# Patient Record
Sex: MALE | Race: WHITE | NOT HISPANIC OR LATINO | Employment: FULL TIME | URBAN - METROPOLITAN AREA
[De-identification: names, ages, dates, MRNs, and addresses within clinical notes are randomized per-mention and may not be internally consistent; named-entity substitution may affect disease eponyms.]

---

## 2024-03-12 ENCOUNTER — MEDICAL CORRESPONDENCE (OUTPATIENT)
Dept: HEALTH INFORMATION MANAGEMENT | Facility: CLINIC | Age: 64
End: 2024-03-12
Payer: COMMERCIAL

## 2024-03-14 ENCOUNTER — TELEPHONE (OUTPATIENT)
Dept: ENDOCRINOLOGY | Facility: CLINIC | Age: 64
End: 2024-03-14
Payer: COMMERCIAL

## 2024-03-14 ENCOUNTER — TRANSCRIBE ORDERS (OUTPATIENT)
Dept: OTHER | Age: 64
End: 2024-03-14

## 2024-03-14 DIAGNOSIS — E05.90 HYPERTHYROIDISM: Primary | ICD-10-CM

## 2024-03-14 NOTE — TELEPHONE ENCOUNTER
Spoke with pt and scheduled sooner appt per Brenda triage note   CC's please schedule urgent on call or CAMILA within 1 week. I held 3/26 9AM with Dr. Lopez.     Thank you!   VIC Ahuja on 3/14/2024 at 4:00 PM

## 2024-03-15 NOTE — CONFIDENTIAL NOTE
RECORDS RECEIVED FROM: internal /ce   DATE RECEIVED: 3.26.24    NOTES (FOR ALL VISITS) STATUS DETAILS   OFFICE NOTES from referring provider ce   Dr. Kendell Powers affiliated with [facility].    OFFICE NOTES from other specialist ce Jennifer- 3.12.24 latisha    MEDICATION LIST internal     IMAGING      ULTRASOUND (HEAD/NECK) ce [facility]- 3.12.24    LABS     DIABETES: HBGA1C, CREATININE, FASTING LIPIDS, MICROALBUMIN URINE, POTASSIUM, TSH, T4    THYROID: TSH, T4, CBC, THYRODLONULIN, TOTAL T3, FREE T4, CALCITONIN, CEA ce  Thyroid stim- 3.12.24  T4- 3.12.24  T3- 3.12.24  Thyroperoxidas-3.12.24  Thyroglobulim- 3.12.24     Action 3.15.24 sv    Action Taken Image request sent to [facility]-   US head/neck 3.12.24

## 2024-03-26 ENCOUNTER — PRE VISIT (OUTPATIENT)
Dept: ENDOCRINOLOGY | Facility: CLINIC | Age: 64
End: 2024-03-26

## 2024-03-26 ENCOUNTER — VIRTUAL VISIT (OUTPATIENT)
Dept: ENDOCRINOLOGY | Facility: CLINIC | Age: 64
End: 2024-03-26
Payer: COMMERCIAL

## 2024-03-26 DIAGNOSIS — E05.90 HYPERTHYROIDISM: ICD-10-CM

## 2024-03-26 PROCEDURE — 99204 OFFICE O/P NEW MOD 45 MIN: CPT | Mod: 95 | Performed by: INTERNAL MEDICINE

## 2024-03-26 PROCEDURE — G2211 COMPLEX E/M VISIT ADD ON: HCPCS | Mod: 95 | Performed by: INTERNAL MEDICINE

## 2024-03-26 ASSESSMENT — PAIN SCALES - GENERAL: PAINLEVEL: NO PAIN (0)

## 2024-03-26 NOTE — LETTER
3/26/2024       RE: Quincy Keane  [address]  [place] [address]     Dear Colleague,    Thank you for referring your patient, Quincy Keane, to the [facility] [place] at [facility]. Please see a copy of my visit note below.    Video-Visit Details    Type of service:  Video Visit  Video Start Time: 0901  Video End Time:0922  Originating Location (pt. Location): [place], [place]  Distant Location (provider location):  Home  Platform used for Video Visit: Javier Lopez MD    Endocrinology Clinic Visit 3/26/2024    NAME:  Quincy Keane  PCP:  No Ref-Primary, Physician  MRN:  [id number]  Reason for Consult:  Abnormal thyroid function test  Requesting Provider:  Referred Self       HISTORY OF PRESENT ILLNESS  Quincy Keane is a 63 year old male  who is here for initial evaluation and management of thyroid concern.    Per record reviewed, Kendell Myers MD - 03/12/2024 8:00 AM CDT  Formatting of this note might be different from the original.  [place] Office Visit  Subjective  Clayton is a 63 year old male who presents for Referral Endocrinology, Lump on left side of neck.    Clayton is a 63 y.o. M who presents to clinic today requesting referral for endocrinology.    He follows with dermatology in regards to a rash. He has had an erythematous patches with itch on his upper back and arms. These lesions were biopsied and consistent with contact dermatitis. He is not convinced with the diagnosis.    In his evaluation, he did also undergo laboratory testing. His TSH was significantly suppressed at that time. This is 2 years ago, and he never proceeded with follow-up.     Last couple years, had rash on the back.  Went to dermatologist--> recommend seeing allergist  Rash is intermittent, gone during vacation.  Pt is wondering if rashes could be from extra heat and sweating from thyroid    Weight stable  Pt reports  having irregular heart beat, asymptomatic. Report that his primary think it could be from thyroid, plan follow up 1-2 week  No shakiness  Sleep is find  No eye pain/ double vision  No smoking    Pt has thyroid problem for about 45 years, saw thyroid doctor and was told that he needed to take thyroid medication the rest of your life. Could not recall the name or how he took the medication.  Pt took thyroid medication and stopped 20 years ago  No changes in symptoms since stopped medication    Pt feels hot intermittently and rash  Mild headache, intermittently    Lump on the neck about 1 month, size stable, not growth  No mass on the front of neck  No problem swallowing/ breathing/ voice change    No h/o radiation Tx  No FH thyroid    Had CT with contrast 3/20/24 for neck mass, pt reported plan appointment 3/28/24 for the biopsy      No biotin/ hair skin nail supplement  Pt drinks herbal tea daily    REVIEW OF SYSTEMS  10 point negative except as mentioned in HPI    Past Medical/Surgical History:  H/o thyroid problem    Medications  Current Outpatient Medications   Medication    methylPREDNISolone (MEDROL DOSEPACK) 4 MG tablet     No current facility-administered medications for this visit.       Allergies  No Known Allergies      Family History  family history is not on file.    Social History  Social History     Tobacco Use    Smoking status: Never    Smokeless tobacco: Never   Substance Use Topics    Alcohol use: Not on file       Physical Exam  There were no vitals taken for this visit.  There is no height or weight on file to calculate BMI.  GENERAL :  In no apparent distress  EYES: No obvious proptosis  RESP: Normal breathing  NEURO: awake, alert, responds appropriately to questions.      DATA REVIEW  Labs/Imaging  Thyroid Stimulating hormone  0.350 - 3.740 UIU/mL <0.007 Low    Resulting [facility] LABORATORY   Specimen Collected: 02/18/22  9:06 AM     Component  Ref Range & Units 2 wk ago    Thyroid Stimulating hormone  0.47 - 4.68 mIU/L <0.02 Low    Resulting [facility] LABORATORY   Specimen Collected: 03/12/24  9:00 AM     Component  Ref Range & Units 2 wk ago   T3, TOTAL  97 - 169 ng/dL 195 High    Resulting [facility] LABORATORY   Specimen Collected: 03/12/24  9:00 AM     Component  Ref Range & Units 2 wk ago   T4, Free  0.78 - 2.19 ng/dL 1.37   Resulting [facility] LABORATORY   Specimen Collected: 03/12/24  9:00 AM     PROCEDURE: US SOFT TISSUE MISC     HISTORY: Lump.     TECHNIQUE: Sonographic images were acquired of the left side of the neck   posteriorly at the site of the patient's lump.     COMPARISON: None.     IMPRESSION: At the site of the lump posteriorly in the left side of the neck,   just deep to the subcutaneous fat, there is a lobulated solid lesion measuring   2.8 x 2.4 x 1.5 cm. This is suspicious for a neoplastic process. This could be   an abnormal lymph node. Other etiologies are not excluded. Recommend further   evaluation with a contrast-enhanced CT of the neck.     Electronically signed by Anderson Lock MD   Report Date: 3/12/2024 3:30 PM     TECHNIQUE: Contrast-enhanced CT imaging of the neck utilizing 3 mm axial slices.   Coronal and sagittal reformats provided.     Contrast: 100 mL of Omnipaque 350 was administered intravenously. No immediate   complication.     CT DLP DOSE: 269 (mGy.cm).     COMPARISON: Neck ultrasound 3/12/2024.     FINDINGS:     Airway: Patent and midline.     Tonsils: Punctate tonsillitis bilaterally.     Lymph nodes: Left posterolateral neck lobulated 2.4 x 1.3 cm soft tissue density   lesion located deep to the sternocleidomastoid (series 2, image 46). No   additional enlarged cervical lymph nodes identified.     Salivary glands: Normal.     Thyroid: Unremarkable.     Vasculature: Large vessels patent. Asymmetrically diminutive left vertebral   artery.     Orbits: Intact.      Paranasal sinuses: Scattered mucosal thickening predominating in the right   maxillary sinus.     Teeth: Largely intact.     Imaged intracranial structures: Unremarkable.     Osseous: Advanced multilevel cervical spondylosis. No suspicious lesion.     Soft tissues: Unremarkable.     IMPRESSION:   1. [place] lobulated 2.4 cm lesion in the left posterolateral neck remains   indeterminate. Lesion is amenable to ultrasound-guided core needle biopsy, which   should be considered for further evaluation.   2.  No additional enlarged cervical lymph nodes identified.     ASSESSMENT/PLAN:   ## Primary hyperthyroidism  You are seen for high thyroid hormone (thyrotoxicosis)  This can be caused by an autoimmune condition (Graves  disease), inflammation of the thyroid (thyroiditis), or due to thyroid nodules making too much thyroid hormone (hot nodule or toxic multinodular goiter).  The first step is to recheck labs and determine etiologies of thyrotoxicosis.    In Graves  disease these antibodies (called the thyrotropin receptor antibodies (TRAb) or thyroid stimulating immunoglobulins (TSI) do the opposite - they cause the cells to work overtime. The antibodies in Graves  disease bind to receptors on the surface of thyroid cells and stimulate those cells to overproduce and release thyroid hormones. This results in an overactive thyroid (hyperthyroidism).    We discussed that heat and sweating are part of symptoms of overactive thyroid.  If rashes are associated with that, should improve with thyroid treatment.  I recommend continue to work with dermatologist.    CT scan with contrast can affect thyroid function test (iodide load).  From CT, thyroid is unremarkable, will get more detail image with thyroid US    ## Left neck mass/ LN  Pt reports having appointment for biopsy 3/28/24      Follow-up next week (over/double book)    Orders Placed This Encounter   Procedures    US Thyroid    TSH with free T4 reflex    Thyrotropin  Receptor Antibody    Thyroid stimulating immunoglobulin    CBC with platelets    Hepatic panel (Albumin, ALT, AST, Bili, Alk Phos, TP)    T3, Free         The longitudinal plan of care for the diagnosis(es)/condition(s) as documented were addressed during this visit. Due to the added complexity in care, I will continue to support Quincy in the subsequent management and with ongoing continuity of care.    Cedric Lopez MD

## 2024-03-26 NOTE — PROGRESS NOTES
Video-Visit Details    Type of service:  Video Visit  Video Start Time: 0901  Video End Time:0922  Originating Location (pt. Location): [place], [place]  Distant Location (provider location):  Home  Platform used for Video Visit: Javier Lopez MD    Endocrinology Clinic Visit 3/26/2024    NAME:  Quincy Keane  PCP:  No Ref-Primary, Physician  MRN:  [id number]  Reason for Consult:  Abnormal thyroid function test  Requesting Provider:  Referred Self       HISTORY OF PRESENT ILLNESS  Quincy Kenae is a 63 year old male  who is here for initial evaluation and management of thyroid concern.    Per record reviewed, Kendell Myers MD - 03/12/2024 8:00 AM CDT  Formatting of this note might be different from the original.  [place] Office Visit  Subjective  Clayton is a 63 year old male who presents for Referral Endocrinology, Lump on left side of neck.    Clayton is a 63 y.o. M who presents to clinic today requesting referral for endocrinology.    He follows with dermatology in regards to a rash. He has had an erythematous patches with itch on his upper back and arms. These lesions were biopsied and consistent with contact dermatitis. He is not convinced with the diagnosis.    In his evaluation, he did also undergo laboratory testing. His TSH was significantly suppressed at that time. This is 2 years ago, and he never proceeded with follow-up.     Last couple years, had rash on the back.  Went to dermatologist--> recommend seeing allergist  Rash is intermittent, gone during vacation.  Pt is wondering if rashes could be from extra heat and sweating from thyroid    Weight stable  Pt reports having irregular heart beat, asymptomatic. Report that his primary think it could be from thyroid, plan follow up 1-2 week  No shakiness  Sleep is find  No eye pain/ double vision  No smoking    Pt has thyroid problem for about 45 years, saw thyroid doctor and was told that he needed to take thyroid medication the  rest of your life. Could not recall the name or how he took the medication.  Pt took thyroid medication and stopped 20 years ago  No changes in symptoms since stopped medication    Pt feels hot intermittently and rash  Mild headache, intermittently    Lump on the neck about 1 month, size stable, not growth  No mass on the front of neck  No problem swallowing/ breathing/ voice change    No h/o radiation Tx  No FH thyroid    Had CT with contrast 3/20/24 for neck mass, pt reported plan appointment 3/28/24 for the biopsy      No biotin/ hair skin nail supplement  Pt drinks herbal tea daily    REVIEW OF SYSTEMS  10 point negative except as mentioned in HPI    Past Medical/Surgical History:  H/o thyroid problem    Medications  Current Outpatient Medications   Medication    methylPREDNISolone (MEDROL DOSEPACK) 4 MG tablet     No current facility-administered medications for this visit.       Allergies  No Known Allergies      Family History  family history is not on file.    Social History  Social History     Tobacco Use    Smoking status: Never    Smokeless tobacco: Never   Substance Use Topics    Alcohol use: Not on file       Physical Exam  There were no vitals taken for this visit.  There is no height or weight on file to calculate BMI.  GENERAL :  In no apparent distress  EYES: No obvious proptosis  RESP: Normal breathing  NEURO: awake, alert, responds appropriately to questions.      DATA REVIEW  Labs/Imaging  Thyroid Stimulating hormone  0.350 - 3.740 UIU/mL <0.007 Low    Resulting [facility] LABORATORY   Specimen Collected: 02/18/22  9:06 AM     Component  Ref Range & Units 2 wk ago   Thyroid Stimulating hormone  0.47 - 4.68 mIU/L <0.02 Low    Resulting [facility] LABORATORY   Specimen Collected: 03/12/24  9:00 AM     Component  Ref Range & Units 2 wk ago   T3, TOTAL  97 - 169 ng/dL 195 High    Resulting [facility] LABORATORY   Specimen Collected:  03/12/24  9:00 AM     Component  Ref Range & Units 2 wk ago   T4, Free  0.78 - 2.19 ng/dL 1.37   Resulting [facility] LABORATORY   Specimen Collected: 03/12/24  9:00 AM     PROCEDURE: US SOFT TISSUE MISC     HISTORY: Lump.     TECHNIQUE: Sonographic images were acquired of the left side of the neck   posteriorly at the site of the patient's lump.     COMPARISON: None.     IMPRESSION: At the site of the lump posteriorly in the left side of the neck,   just deep to the subcutaneous fat, there is a lobulated solid lesion measuring   2.8 x 2.4 x 1.5 cm. This is suspicious for a neoplastic process. This could be   an abnormal lymph node. Other etiologies are not excluded. Recommend further   evaluation with a contrast-enhanced CT of the neck.     Electronically signed by Anderson Lock MD   Report Date: 3/12/2024 3:30 PM     TECHNIQUE: Contrast-enhanced CT imaging of the neck utilizing 3 mm axial slices.   Coronal and sagittal reformats provided.     Contrast: 100 mL of Omnipaque 350 was administered intravenously. No immediate   complication.     CT DLP DOSE: 269 (mGy.cm).     COMPARISON: Neck ultrasound 3/12/2024.     FINDINGS:     Airway: Patent and midline.     Tonsils: Punctate tonsillitis bilaterally.     Lymph nodes: Left posterolateral neck lobulated 2.4 x 1.3 cm soft tissue density   lesion located deep to the sternocleidomastoid (series 2, image 46). No   additional enlarged cervical lymph nodes identified.     Salivary glands: Normal.     Thyroid: Unremarkable.     Vasculature: Large vessels patent. Asymmetrically diminutive left vertebral   artery.     Orbits: Intact.     Paranasal sinuses: Scattered mucosal thickening predominating in the right   maxillary sinus.     Teeth: Largely intact.     Imaged intracranial structures: Unremarkable.     Osseous: Advanced multilevel cervical spondylosis. No suspicious lesion.     Soft tissues: Unremarkable.     IMPRESSION:   1. [place] lobulated 2.4  cm lesion in the left posterolateral neck remains   indeterminate. Lesion is amenable to ultrasound-guided core needle biopsy, which   should be considered for further evaluation.   2.  No additional enlarged cervical lymph nodes identified.     ASSESSMENT/PLAN:   ## Primary hyperthyroidism  You are seen for high thyroid hormone (thyrotoxicosis)  This can be caused by an autoimmune condition (Graves  disease), inflammation of the thyroid (thyroiditis), or due to thyroid nodules making too much thyroid hormone (hot nodule or toxic multinodular goiter).  The first step is to recheck labs and determine etiologies of thyrotoxicosis.    In Graves  disease these antibodies (called the thyrotropin receptor antibodies (TRAb) or thyroid stimulating immunoglobulins (TSI) do the opposite - they cause the cells to work overtime. The antibodies in Graves  disease bind to receptors on the surface of thyroid cells and stimulate those cells to overproduce and release thyroid hormones. This results in an overactive thyroid (hyperthyroidism).    We discussed that heat and sweating are part of symptoms of overactive thyroid.  If rashes are associated with that, should improve with thyroid treatment.  I recommend continue to work with dermatologist.    CT scan with contrast can affect thyroid function test (iodide load).  From CT, thyroid is unremarkable, will get more detail image with thyroid US    ## Left neck mass/ LN  Pt reports having appointment for biopsy 3/28/24      Follow-up next week (over/double book)    Orders Placed This Encounter   Procedures    US Thyroid    TSH with free T4 reflex    Thyrotropin Receptor Antibody    Thyroid stimulating immunoglobulin    CBC with platelets    Hepatic panel (Albumin, ALT, AST, Bili, Alk Phos, TP)    T3, Free         The longitudinal plan of care for the diagnosis(es)/condition(s) as documented were addressed during this visit. Due to the added complexity in care, I will continue to  support Quincy in the subsequent management and with ongoing continuity of care.    Cedric Lopez MD

## 2024-03-26 NOTE — NURSING NOTE
Is the patient currently in the state of [place]? YES    Visit mode:VIDEO    If the visit is dropped, the patient can be reconnected by: VIDEO VISIT: Text to cell phone:   Telephone Information:   Mobile [phone]       Will anyone else be joining the visit? NO  (If patient encounters technical issues they should call [phone])    How would you like to obtain your AVS? MyChart    Are changes needed to the allergy or medication list? Yes pt is not taking any medications     Reason for visit: Consult (Hyperthyroidism)    Dorie MONTEROF

## 2024-03-26 NOTE — PROGRESS NOTES
"Virtual Visit Details    Type of service:  Video Visit     Originating Location (pt. Location): {video visit patient location:280023::\"Home\"}  {PROVIDER LOCATION On-site should be selected for visits conducted from your clinic location or adjoining [facility] hospital, academic office, or other nearby [facility] building. Off-site should be selected for all other provider locations, including home:[id number]}  Distant Location (provider location):  {virtual location provider:119287}  Platform used for Video Visit: {Virtual Visit Platforms:570130::\"xMatters\"}  "

## 2024-03-29 ENCOUNTER — LAB (OUTPATIENT)
Dept: LAB | Facility: CLINIC | Age: 64
End: 2024-03-29
Payer: COMMERCIAL

## 2024-03-29 ENCOUNTER — ANCILLARY PROCEDURE (OUTPATIENT)
Dept: ULTRASOUND IMAGING | Facility: CLINIC | Age: 64
End: 2024-03-29
Attending: INTERNAL MEDICINE
Payer: COMMERCIAL

## 2024-03-29 DIAGNOSIS — E05.90 HYPERTHYROIDISM: ICD-10-CM

## 2024-03-29 LAB
ALBUMIN SERPL BCG-MCNC: 4.3 G/DL (ref 3.5–5.2)
ALP SERPL-CCNC: 68 U/L (ref 40–150)
ALT SERPL W P-5'-P-CCNC: 12 U/L (ref 0–70)
AST SERPL W P-5'-P-CCNC: 21 U/L (ref 0–45)
BILIRUB DIRECT SERPL-MCNC: <0.2 MG/DL (ref 0–0.3)
BILIRUB SERPL-MCNC: 0.5 MG/DL
ERYTHROCYTE [DISTWIDTH] IN BLOOD BY AUTOMATED COUNT: 12.7 % (ref 10–15)
HCT VFR BLD AUTO: 45.2 % (ref 40–53)
HGB BLD-MCNC: 15.5 G/DL (ref 13.3–17.7)
MCH RBC QN AUTO: 30.6 PG (ref 26.5–33)
MCHC RBC AUTO-ENTMCNC: 34.3 G/DL (ref 31.5–36.5)
MCV RBC AUTO: 89 FL (ref 78–100)
PLATELET # BLD AUTO: 212 10E3/UL (ref 150–450)
PROT SERPL-MCNC: 7.2 G/DL (ref 6.4–8.3)
RBC # BLD AUTO: 5.06 10E6/UL (ref 4.4–5.9)
T3FREE SERPL-MCNC: 4.1 PG/ML (ref 2–4.4)
T4 FREE SERPL-MCNC: 1.43 NG/DL (ref 0.9–1.7)
TSH SERPL DL<=0.005 MIU/L-ACNC: <0.01 UIU/ML (ref 0.3–4.2)
WBC # BLD AUTO: 3.6 10E3/UL (ref 4–11)

## 2024-03-29 PROCEDURE — 76536 US EXAM OF HEAD AND NECK: CPT | Mod: GC | Performed by: RADIOLOGY

## 2024-03-29 PROCEDURE — 36415 COLL VENOUS BLD VENIPUNCTURE: CPT | Performed by: PATHOLOGY

## 2024-03-29 PROCEDURE — 85027 COMPLETE CBC AUTOMATED: CPT | Performed by: PATHOLOGY

## 2024-03-29 PROCEDURE — 99000 SPECIMEN HANDLING OFFICE-LAB: CPT | Performed by: PATHOLOGY

## 2024-03-29 PROCEDURE — 84445 ASSAY OF TSI GLOBULIN: CPT | Mod: 90 | Performed by: PATHOLOGY

## 2024-03-29 PROCEDURE — 84439 ASSAY OF FREE THYROXINE: CPT | Performed by: PATHOLOGY

## 2024-03-29 PROCEDURE — 83520 IMMUNOASSAY QUANT NOS NONAB: CPT | Mod: 90 | Performed by: PATHOLOGY

## 2024-03-29 PROCEDURE — 84481 FREE ASSAY (FT-3): CPT | Performed by: INTERNAL MEDICINE

## 2024-03-29 PROCEDURE — 84443 ASSAY THYROID STIM HORMONE: CPT | Performed by: PATHOLOGY

## 2024-03-29 PROCEDURE — 80076 HEPATIC FUNCTION PANEL: CPT | Performed by: PATHOLOGY

## 2024-03-29 NOTE — RESULT ENCOUNTER NOTE
Team - please let patient know that his thyroid function test is just slightly off (T4 and T3 are normal, and TSH is low). Will discuss more at follow up.  Please schedule his Follow-up 4/3/24 at 10.30 am, ok for double/overbook

## 2024-03-30 LAB — TSH RECEP AB SER-ACNC: <1.1 IU/L (ref 0–1.75)

## 2024-04-02 LAB — TSI SER-ACNC: <1 TSI INDEX

## 2024-04-03 ENCOUNTER — VIRTUAL VISIT (OUTPATIENT)
Dept: ENDOCRINOLOGY | Facility: CLINIC | Age: 64
End: 2024-04-03
Payer: COMMERCIAL

## 2024-04-03 DIAGNOSIS — R94.6 ABNORMAL THYROID FUNCTION TEST: Primary | ICD-10-CM

## 2024-04-03 PROCEDURE — G2211 COMPLEX E/M VISIT ADD ON: HCPCS | Mod: 95 | Performed by: INTERNAL MEDICINE

## 2024-04-03 PROCEDURE — 99214 OFFICE O/P EST MOD 30 MIN: CPT | Mod: 95 | Performed by: INTERNAL MEDICINE

## 2024-04-03 NOTE — LETTER
4/3/2024         RE: Quincy Keane  [address]  [place] [address]        Dear Colleague,    Thank you for referring your patient, Quincy Keane, to the [facility] [place]. Please see a copy of my visit note below.    Video-Visit Details    Type of service:  Video Visit  Video Start Time: 1107  Video End Time:1120  Originating Location (pt. Location): [place], [place]  Distant Location (provider location):  Home  Platform used for Video Visit: Javier Lopez MD        HISTORY OF PRESENT ILLNESS  Quincy Keane is a 63 year old male  who is here for Follow-up thyroid concerns    Interval History  Pt will have meeting to schedule biopsy in [place] this afternoon  Pt is feeling well, but was told that he has irregular heart beat.  He is ok with medication but not other treatment if he has Graves' disease     Initial visit  Per record reviewed, Kendell Myers MD - 03/12/2024 8:00 AM CDT  Formatting of this note might be different from the original.  [place] Office Visit  Subjective  Clayton is a 63 year old male who presents for Referral Endocrinology, Lump on left side of neck.    Clayton is a 63 y.o. M who presents to clinic today requesting referral for endocrinology.    He follows with dermatology in regards to a rash. He has had an erythematous patches with itch on his upper back and arms. These lesions were biopsied and consistent with contact dermatitis. He is not convinced with the diagnosis.    In his evaluation, he did also undergo laboratory testing. His TSH was significantly suppressed at that time. This is 2 years ago, and he never proceeded with follow-up.     Last couple years, had rash on the back.  Went to dermatologist--> recommend seeing allergist  Rash is intermittent, gone during vacation.  Pt is wondering if rashes could be from extra heat and sweating from thyroid    Weight stable  Pt reports having irregular heart beat, asymptomatic.  Report that his primary think it could be from thyroid, plan follow up 1-2 week  No shakiness  Sleep is find  No eye pain/ double vision  No smoking    Pt has thyroid problem for about 45 years, saw thyroid doctor and was told that he needed to take thyroid medication the rest of your life. Could not recall the name or how he took the medication.  Pt took thyroid medication and stopped 20 years ago  No changes in symptoms since stopped medication    Pt feels hot intermittently and rash  Mild headache, intermittently    Lump on the neck about 1 month, size stable, not growth  No mass on the front of neck  No problem swallowing/ breathing/ voice change    No h/o radiation Tx  No FH thyroid    Had CT with contrast 3/20/24 for neck mass, pt reported plan appointment 3/28/24 for the biopsy      No biotin/ hair skin nail supplement  Pt drinks herbal tea daily    REVIEW OF SYSTEMS  10 point negative except as mentioned in HPI    Past Medical/Surgical History:  H/o thyroid problem    Medications  Current Outpatient Medications   Medication Sig Dispense Refill     methylPREDNISolone (MEDROL DOSEPACK) 4 MG tablet Take  by mouth See Admin Instructions. Use as directed (Patient not taking: Reported on 4/3/2024) 1 Package 0     No current facility-administered medications for this visit.       Allergies  No Known Allergies      Family History  family history is not on file.    Social History  Social History     Tobacco Use     Smoking status: Never     Smokeless tobacco: Never   Substance Use Topics     Alcohol use: Not on file       Physical Exam  GENERAL :  In no apparent distress  Neck: visible left posterior neck mass  EYES: No obvious proptosis  RESP: Normal breathing  NEURO: awake, alert, responds appropriately to questions.      DATA REVIEW  Labs/Imaging  Thyroid Stimulating hormone  0.350 - 3.740 UIU/mL <0.007 Low    Resulting Agency [facility]   Specimen Collected: 02/18/22  9:06 AM      Component  Ref Range & Units 2 wk ago   Thyroid Stimulating hormone  0.47 - 4.68 mIU/L <0.02 Low    Resulting [facility]ORATORY   Specimen Collected: 03/12/24  9:00 AM     Component  Ref Range & Units 2 wk ago   T3, TOTAL  97 - 169 ng/dL 195 High    Resulting [facility] LABORATORY   Specimen Collected: 03/12/24  9:00 AM     Component  Ref Range & Units 2 wk ago   T4, Free  0.78 - 2.19 ng/dL 1.37   Resulting [facility] LABORATORY   Specimen Collected: 03/12/24  9:00 AM     PROCEDURE: US SOFT TISSUE MISC     HISTORY: Lump.     TECHNIQUE: Sonographic images were acquired of the left side of the neck   posteriorly at the site of the patient's lump.     COMPARISON: None.     IMPRESSION: At the site of the lump posteriorly in the left side of the neck,   just deep to the subcutaneous fat, there is a lobulated solid lesion measuring   2.8 x 2.4 x 1.5 cm. This is suspicious for a neoplastic process. This could be   an abnormal lymph node. Other etiologies are not excluded. Recommend further   evaluation with a contrast-enhanced CT of the neck.     Electronically signed by Anderson Lock MD   Report Date: 3/12/2024 3:30 PM     TECHNIQUE: Contrast-enhanced CT imaging of the neck utilizing 3 mm axial slices.   Coronal and sagittal reformats provided.     Contrast: 100 mL of Omnipaque 350 was administered intravenously. No immediate   complication.     CT DLP DOSE: 269 (mGy.cm).     COMPARISON: Neck ultrasound 3/12/2024.     FINDINGS:     Airway: Patent and midline.     Tonsils: Punctate tonsillitis bilaterally.     Lymph nodes: Left posterolateral neck lobulated 2.4 x 1.3 cm soft tissue density   lesion located deep to the sternocleidomastoid (series 2, image 46). No   additional enlarged cervical lymph nodes identified.     Salivary glands: Normal.     Thyroid: Unremarkable.     Vasculature: Large vessels patent. Asymmetrically diminutive left  vertebral   artery.     Orbits: Intact.     Paranasal sinuses: Scattered mucosal thickening predominating in the right   maxillary sinus.     Teeth: Largely intact.     Imaged intracranial structures: Unremarkable.     Osseous: Advanced multilevel cervical spondylosis. No suspicious lesion.     Soft tissues: Unremarkable.     IMPRESSION:   1. [place] lobulated 2.4 cm lesion in the left posterolateral neck remains   indeterminate. Lesion is amenable to ultrasound-guided core needle biopsy, which   should be considered for further evaluation.   2.  No additional enlarged cervical lymph nodes identified.       US THYROID 3/29/2024 9:40 AM     COMPARISON: None     HISTORY: Hyperthyroidism     FINDINGS:   Thyroid parenchyma: Diffusely heterogeneous thyroid parenchyma with  multifocal hypoechoic areas and increased color Doppler flow  bilaterally.  The right lobe of the thyroid measures: 2.0 x 1.7 x 5.8 cm  The left lobe of the thyroid measures: 1.7 x 1.3 x 4.8 cm  The thyroid isthmus measures: 0.2 cm     In the left neck at the level 5 station there is a well-circumscribed  avascular 2.7 x 1.2 x 2.5 cm heterogeneous mass that appears isoechoic  to adjacent musculature. This lesion is deep to and separate from the  visualized muscles.                                                                      Impression:  1. 2.7 cm heterogeneous avascular lesion in the left level 5 station  deep to the musculature, which may represent an enlarged lymph node  versus a neoplastic mass. Recommend further evaluation with  contrast-enhanced CT neck/MRI.  2. Findings suggestive of thyroiditis, recommend correlation with  thyroid function tests.     ASSESSMENT/PLAN:   ##Subclinical hyperthyroidism  ## Low WBC  ## Reports H/o irregular heart rate  ## Left neck mass/ LN  Labs show subclinical hypothyroidism, Graves' antibodies are normal.  Thyroid ultrasound showed possible thyroiditis, no thyroid nodules.  2.7 cm left neck  mass  --Patient to get biopsy of left sided neck mass in [place] as planned  -- For subclinical hypothyroidism, will get thyroid uptake and scan  -- If patient has Graves' disease, he is most interested in medical treatment.  His white blood cell count is low, will recheck        Follow-up 2-3 weeks    Orders Placed This Encounter   Procedures     NM Thyroid Uptake and Scan     TSH with free T4 reflex     T3, Free     CBC with platelets and differential         The longitudinal plan of care for the diagnosis(es)/condition(s) as documented were addressed during this visit. Due to the added complexity in care, I will continue to support Quincy in the subsequent management and with ongoing continuity of care.    Cedric Lopez MD          Again, thank you for allowing me to participate in the care of your patient.        Sincerely,        Cedric Lopez MD

## 2024-04-03 NOTE — PROGRESS NOTES
Video-Visit Details    Type of service:  Video Visit  Video Start Time: 1107  Video End Time:1120  Originating Location (pt. Location): [place], [place]  Distant Location (provider location):  Home  Platform used for Video Visit: Javier Lopez MD        HISTORY OF PRESENT ILLNESS  Quincy Keane is a 63 year old male  who is here for Follow-up thyroid concerns    Interval History  Pt will have meeting to schedule biopsy in [place] this afternoon  Pt is feeling well, but was told that he has irregular heart beat.  He is ok with medication but not other treatment if he has Graves' disease     Initial visit  Per record reviewed, Kendell Myers MD - 03/12/2024 8:00 AM CDT  Formatting of this note might be different from the original.  [place] Office Visit  Subjective  Clayton is a 63 year old male who presents for Referral Endocrinology, Lump on left side of neck.    Clayton is a 63 y.o. M who presents to clinic today requesting referral for endocrinology.    He follows with dermatology in regards to a rash. He has had an erythematous patches with itch on his upper back and arms. These lesions were biopsied and consistent with contact dermatitis. He is not convinced with the diagnosis.    In his evaluation, he did also undergo laboratory testing. His TSH was significantly suppressed at that time. This is 2 years ago, and he never proceeded with follow-up.     Last couple years, had rash on the back.  Went to dermatologist--> recommend seeing allergist  Rash is intermittent, gone during vacation.  Pt is wondering if rashes could be from extra heat and sweating from thyroid    Weight stable  Pt reports having irregular heart beat, asymptomatic. Report that his primary think it could be from thyroid, plan follow up 1-2 week  No shakiness  Sleep is find  No eye pain/ double vision  No smoking    Pt has thyroid problem for about 45 years, saw thyroid doctor and was told that he needed to take thyroid  medication the rest of your life. Could not recall the name or how he took the medication.  Pt took thyroid medication and stopped 20 years ago  No changes in symptoms since stopped medication    Pt feels hot intermittently and rash  Mild headache, intermittently    Lump on the neck about 1 month, size stable, not growth  No mass on the front of neck  No problem swallowing/ breathing/ voice change    No h/o radiation Tx  No FH thyroid    Had CT with contrast 3/20/24 for neck mass, pt reported plan appointment 3/28/24 for the biopsy      No biotin/ hair skin nail supplement  Pt drinks herbal tea daily    REVIEW OF SYSTEMS  10 point negative except as mentioned in HPI    Past Medical/Surgical History:  H/o thyroid problem    Medications  Current Outpatient Medications   Medication Sig Dispense Refill    methylPREDNISolone (MEDROL DOSEPACK) 4 MG tablet Take  by mouth See Admin Instructions. Use as directed (Patient not taking: Reported on 4/3/2024) 1 Package 0     No current facility-administered medications for this visit.       Allergies  No Known Allergies      Family History  family history is not on file.    Social History  Social History     Tobacco Use    Smoking status: Never    Smokeless tobacco: Never   Substance Use Topics    Alcohol use: Not on file       Physical Exam  GENERAL :  In no apparent distress  Neck: visible left posterior neck mass  EYES: No obvious proptosis  RESP: Normal breathing  NEURO: awake, alert, responds appropriately to questions.      DATA REVIEW  Labs/Imaging  Thyroid Stimulating hormone  0.350 - 3.740 UIU/mL <0.007 Low    Resulting Agency [facility] LAB[facility]ATORY   Specimen Collected: 02/18/22  9:06 AM     Component  Ref Range & Units 2 wk ago   Thyroid Stimulating hormone  0.47 - 4.68 mIU/L <0.02 Low    Resulting Agency [facility]   Specimen Collected: 03/12/24  9:00 AM     Component  Ref Range & Units 2 wk ago   T3, TOTAL  97 - 169 ng/dL 195  High    Resulting [facility]   Specimen Collected: 03/12/24  9:00 AM     Component  Ref Range & Units 2 wk ago   T4, Free  0.78 - 2.19 ng/dL 1.37   Resulting [facility]   Specimen Collected: 03/12/24  9:00 AM     PROCEDURE: US SOFT TISSUE MISC     HISTORY: Lump.     TECHNIQUE: Sonographic images were acquired of the left side of the neck   posteriorly at the site of the patient's lump.     COMPARISON: None.     IMPRESSION: At the site of the lump posteriorly in the left side of the neck,   just deep to the subcutaneous fat, there is a lobulated solid lesion measuring   2.8 x 2.4 x 1.5 cm. This is suspicious for a neoplastic process. This could be   an abnormal lymph node. Other etiologies are not excluded. Recommend further   evaluation with a contrast-enhanced CT of the neck.     Electronically signed by Anderson Lock MD   Report Date: 3/12/2024 3:30 PM     TECHNIQUE: Contrast-enhanced CT imaging of the neck utilizing 3 mm axial slices.   Coronal and sagittal reformats provided.     Contrast: 100 mL of Omnipaque 350 was administered intravenously. No immediate   complication.     CT DLP DOSE: 269 (mGy.cm).     COMPARISON: Neck ultrasound 3/12/2024.     FINDINGS:     Airway: Patent and midline.     Tonsils: Punctate tonsillitis bilaterally.     Lymph nodes: Left posterolateral neck lobulated 2.4 x 1.3 cm soft tissue density   lesion located deep to the sternocleidomastoid (series 2, image 46). No   additional enlarged cervical lymph nodes identified.     Salivary glands: Normal.     Thyroid: Unremarkable.     Vasculature: Large vessels patent. Asymmetrically diminutive left vertebral   artery.     Orbits: Intact.     Paranasal sinuses: Scattered mucosal thickening predominating in the right   maxillary sinus.     Teeth: Largely intact.     Imaged intracranial structures: Unremarkable.     Osseous: Advanced multilevel cervical spondylosis. No  suspicious lesion.     Soft tissues: Unremarkable.     IMPRESSION:   1. [place] lobulated 2.4 cm lesion in the left posterolateral neck remains   indeterminate. Lesion is amenable to ultrasound-guided core needle biopsy, which   should be considered for further evaluation.   2.  No additional enlarged cervical lymph nodes identified.       US THYROID 3/29/2024 9:40 AM     COMPARISON: None     HISTORY: Hyperthyroidism     FINDINGS:   Thyroid parenchyma: Diffusely heterogeneous thyroid parenchyma with  multifocal hypoechoic areas and increased color Doppler flow  bilaterally.  The right lobe of the thyroid measures: 2.0 x 1.7 x 5.8 cm  The left lobe of the thyroid measures: 1.7 x 1.3 x 4.8 cm  The thyroid isthmus measures: 0.2 cm     In the left neck at the level 5 station there is a well-circumscribed  avascular 2.7 x 1.2 x 2.5 cm heterogeneous mass that appears isoechoic  to adjacent musculature. This lesion is deep to and separate from the  visualized muscles.                                                                      Impression:  1. 2.7 cm heterogeneous avascular lesion in the left level 5 station  deep to the musculature, which may represent an enlarged lymph node  versus a neoplastic mass. Recommend further evaluation with  contrast-enhanced CT neck/MRI.  2. Findings suggestive of thyroiditis, recommend correlation with  thyroid function tests.     ASSESSMENT/PLAN:   ##Subclinical hyperthyroidism  ## Low WBC  ## Reports H/o irregular heart rate  ## Left neck mass/ LN  Labs show subclinical hypothyroidism, Graves' antibodies are normal.  Thyroid ultrasound showed possible thyroiditis, no thyroid nodules.  2.7 cm left neck mass  --Patient to get biopsy of left sided neck mass in [place] as planned  -- For subclinical hypothyroidism, will get thyroid uptake and scan  -- If patient has Graves' disease, he is most interested in medical treatment.  His white blood cell count is low, will  recheck        Follow-up 2-3 weeks    Orders Placed This Encounter   Procedures    NM Thyroid Uptake and Scan    TSH with free T4 reflex    T3, Free    CBC with platelets and differential         The longitudinal plan of care for the diagnosis(es)/condition(s) as documented were addressed during this visit. Due to the added complexity in care, I will continue to support Quincy in the subsequent management and with ongoing continuity of care.    Cedric Lopez MD

## 2024-04-29 ENCOUNTER — TRANSFERRED RECORDS (OUTPATIENT)
Dept: HEALTH INFORMATION MANAGEMENT | Facility: CLINIC | Age: 64
End: 2024-04-29

## 2024-05-13 ENCOUNTER — HOSPITAL ENCOUNTER (OUTPATIENT)
Dept: NUCLEAR MEDICINE | Facility: CLINIC | Age: 64
Setting detail: NUCLEAR MEDICINE
Discharge: HOME | End: 2024-05-13
Attending: INTERNAL MEDICINE
Payer: COMMERCIAL

## 2024-05-13 DIAGNOSIS — R94.6 ABNORMAL THYROID FUNCTION TEST: ICD-10-CM

## 2024-05-13 PROCEDURE — 343N000001 HC RX 343: Performed by: INTERNAL MEDICINE

## 2024-05-13 PROCEDURE — 78014 THYROID IMAGING W/BLOOD FLOW: CPT | Mod: 26 | Performed by: RADIOLOGY

## 2024-05-13 PROCEDURE — A9516 IODINE I-123 SOD IODIDE MIC: HCPCS | Performed by: INTERNAL MEDICINE

## 2024-05-13 PROCEDURE — 78014 THYROID IMAGING W/BLOOD FLOW: CPT

## 2024-05-13 RX ADMIN — Medication 249 MICROCURIE: at 11:05

## 2024-05-14 ENCOUNTER — HOSPITAL ENCOUNTER (OUTPATIENT)
Dept: NUCLEAR MEDICINE | Facility: CLINIC | Age: 64
Setting detail: NUCLEAR MEDICINE
Discharge: HOME | End: 2024-05-14
Attending: INTERNAL MEDICINE
Payer: COMMERCIAL

## 2024-05-14 ENCOUNTER — LAB (OUTPATIENT)
Dept: LAB | Facility: CLINIC | Age: 64
End: 2024-05-14
Payer: COMMERCIAL

## 2024-05-14 DIAGNOSIS — R94.6 ABNORMAL THYROID FUNCTION TEST: ICD-10-CM

## 2024-05-14 LAB
BASOPHILS # BLD AUTO: 0.1 10E3/UL (ref 0–0.2)
BASOPHILS NFR BLD AUTO: 1 %
EOSINOPHIL # BLD AUTO: 0.3 10E3/UL (ref 0–0.7)
EOSINOPHIL NFR BLD AUTO: 6 %
ERYTHROCYTE [DISTWIDTH] IN BLOOD BY AUTOMATED COUNT: 13.4 % (ref 10–15)
HCT VFR BLD AUTO: 40.6 % (ref 40–53)
HGB BLD-MCNC: 14.1 G/DL (ref 13.3–17.7)
IMM GRANULOCYTES # BLD: 0 10E3/UL
IMM GRANULOCYTES NFR BLD: 0 %
LYMPHOCYTES # BLD AUTO: 1 10E3/UL (ref 0.8–5.3)
LYMPHOCYTES NFR BLD AUTO: 21 %
MCH RBC QN AUTO: 30.9 PG (ref 26.5–33)
MCHC RBC AUTO-ENTMCNC: 34.7 G/DL (ref 31.5–36.5)
MCV RBC AUTO: 89 FL (ref 78–100)
MONOCYTES # BLD AUTO: 0.6 10E3/UL (ref 0–1.3)
MONOCYTES NFR BLD AUTO: 12 %
NEUTROPHILS # BLD AUTO: 3.1 10E3/UL (ref 1.6–8.3)
NEUTROPHILS NFR BLD AUTO: 60 %
NRBC # BLD AUTO: 0 10E3/UL
NRBC BLD AUTO-RTO: 0 /100
PLATELET # BLD AUTO: 224 10E3/UL (ref 150–450)
RBC # BLD AUTO: 4.56 10E6/UL (ref 4.4–5.9)
T3FREE SERPL-MCNC: 3.9 PG/ML (ref 2–4.4)
T4 FREE SERPL-MCNC: 1.43 NG/DL (ref 0.9–1.7)
TSH SERPL DL<=0.005 MIU/L-ACNC: <0.01 UIU/ML (ref 0.3–4.2)
WBC # BLD AUTO: 5.1 10E3/UL (ref 4–11)

## 2024-05-14 PROCEDURE — 84443 ASSAY THYROID STIM HORMONE: CPT | Performed by: PATHOLOGY

## 2024-05-14 PROCEDURE — 99000 SPECIMEN HANDLING OFFICE-LAB: CPT | Performed by: PATHOLOGY

## 2024-05-14 PROCEDURE — 84481 FREE ASSAY (FT-3): CPT | Performed by: INTERNAL MEDICINE

## 2024-05-14 PROCEDURE — 84439 ASSAY OF FREE THYROXINE: CPT | Performed by: PATHOLOGY

## 2024-05-14 PROCEDURE — 36415 COLL VENOUS BLD VENIPUNCTURE: CPT | Performed by: PATHOLOGY

## 2024-05-14 PROCEDURE — 85025 COMPLETE CBC W/AUTO DIFF WBC: CPT | Performed by: PATHOLOGY

## 2024-05-24 ENCOUNTER — VIRTUAL VISIT (OUTPATIENT)
Dept: ENDOCRINOLOGY | Facility: CLINIC | Age: 64
End: 2024-05-24
Payer: COMMERCIAL

## 2024-05-24 DIAGNOSIS — E05.00 GRAVES DISEASE: ICD-10-CM

## 2024-05-24 DIAGNOSIS — R22.1 NECK MASS: Primary | ICD-10-CM

## 2024-05-24 PROCEDURE — 99215 OFFICE O/P EST HI 40 MIN: CPT | Mod: 95 | Performed by: INTERNAL MEDICINE

## 2024-05-24 PROCEDURE — G2211 COMPLEX E/M VISIT ADD ON: HCPCS | Mod: 95 | Performed by: INTERNAL MEDICINE

## 2024-05-24 RX ORDER — CHOLESTYRAMINE 4 G/9G
1 POWDER, FOR SUSPENSION ORAL 2 TIMES DAILY WITH MEALS
Qty: 60 PACKET | Refills: 0 | Status: SHIPPED | OUTPATIENT
Start: 2024-05-24 | End: 2024-06-21

## 2024-05-24 NOTE — LETTER
5/24/2024         RE: Quincy Keane  [address]  [place] [address]        Dear Colleague,    Thank you for referring your patient, Quincy Keane, to the [facility]. Please see a copy of my visit note below.    Audio-Visit Details    Type of service:  Video Visit  Video Start Time: 0906  Video End Time: 0929  Originating Location (pt. Location): [place], [place]  Distant Location (provider location):  Home  Platform used for Video Visit: Javier Lopez MD        HISTORY OF PRESENT ILLNESS  Quincy Keane is a 63 year old male  who is here for Follow-up thyroid concerns    Interval History  Pt had left sided mass biopsy.  Per Dr. Allen note 4/29/24  I called Clayton and went over the results. He was given the options of repeating the same test over (in hopes of a different outcome -- a definitive diagnosis) versus an excisional biopsy in the OR. Clayton preferred to give the core needle biopsy one more attempt. I think this is reasonable. We would like pursue an open biopsy if it does not give us the information we need. Please reach out to him to schedule.    Pt reported he is interested in excisional biopsy and would like to have it done through [facility]  Rashes are gone  No palpitation/shakiness  No eye pain/ double vision    Initial visit  Per record reviewed, Kendell Myers MD - 03/12/2024 8:00 AM CDT  Formatting of this note might be different from the original.  [place] Office Visit  Subjective  Clayton is a 63 year old male who presents for Referral Endocrinology, Lump on left side of neck.    Clayton is a 63 y.o. M who presents to clinic today requesting referral for endocrinology.    He follows with dermatology in regards to a rash. He has had an erythematous patches with itch on his upper back and arms. These lesions were biopsied and consistent with contact dermatitis. He is not convinced with the diagnosis.    In his evaluation, he did also  undergo laboratory testing. His TSH was significantly suppressed at that time. This is 2 years ago, and he never proceeded with follow-up.     Last couple years, had rash on the back.  Went to dermatologist--> recommend seeing allergist  Rash is intermittent, gone during vacation.  Pt is wondering if rashes could be from extra heat and sweating from thyroid    Weight stable  Pt reports having irregular heart beat, asymptomatic. Report that his primary think it could be from thyroid, plan follow up 1-2 week  No shakiness  Sleep is find  No eye pain/ double vision  No smoking    Pt has thyroid problem for about 45 years, saw thyroid doctor and was told that he needed to take thyroid medication the rest of your life. Could not recall the name or how he took the medication.  Pt took thyroid medication and stopped 20 years ago  No changes in symptoms since stopped medication    Pt feels hot intermittently and rash  Mild headache, intermittently    Lump on the neck about 1 month, size stable, not growth  No mass on the front of neck  No problem swallowing/ breathing/ voice change    No h/o radiation Tx  No FH thyroid    Had CT with contrast 3/20/24 for neck mass, pt reported plan appointment 3/28/24 for the biopsy      No biotin/ hair skin nail supplement  Pt drinks herbal tea daily    REVIEW OF SYSTEMS  10 point negative except as mentioned in HPI    Past Medical/Surgical History:  H/o thyroid problem    Medications  Current Outpatient Medications   Medication Sig Dispense Refill     cholestyramine (QUESTRAN) 4 g packet Take 1 packet (4 g) by mouth 2 times daily (with meals) for 30 days 60 packet 0     methylPREDNISolone (MEDROL DOSEPACK) 4 MG tablet Take  by mouth See Admin Instructions. Use as directed (Patient not taking: Reported on 4/3/2024) 1 Package 0     No current facility-administered medications for this visit.       Allergies  No Known Allergies      Family History  family history is not on file.    Social  History  Social History     Tobacco Use     Smoking status: Never     Smokeless tobacco: Never   Substance Use Topics     Alcohol use: Not on file       Physical Exam  GENERAL :  In no apparent distress  Neck: visible left posterior neck mass  EYES: No obvious proptosis  RESP: Normal breathing  NEURO: awake, alert, responds appropriately to questions.      DATA REVIEW  Labs/Imaging  Thyroid Stimulating hormone  0.350 - 3.740 UIU/mL <0.007 Low    [facility]   Specimen Collected: 02/18/22  9:06 AM     Component  Ref Range & Units 2 wk ago   Thyroid Stimulating hormone  0.47 - 4.68 mIU/L <0.02 Low    [facility]   Specimen Collected: 03/12/24  9:00 AM     Component  Ref Range & Units 2 wk ago   T3, TOTAL  97 - 169 ng/dL 195 High    [facility]   Specimen Collected: 03/12/24  9:00 AM     Component  Ref Range & Units 2 wk ago   T4, Free  0.78 - 2.19 ng/dL 1.37   [facility]   Specimen Collected: 03/12/24  9:00 AM     PROCEDURE: US SOFT TISSUE MISC     HISTORY: Lump.     TECHNIQUE: Sonographic images were acquired of the left side of the neck   posteriorly at the site of the patient's lump.     COMPARISON: None.     IMPRESSION: At the site of the lump posteriorly in the left side of the neck,   just deep to the subcutaneous fat, there is a lobulated solid lesion measuring   2.8 x 2.4 x 1.5 cm. This is suspicious for a neoplastic process. This could be   an abnormal lymph node. Other etiologies are not excluded. Recommend further   evaluation with a contrast-enhanced CT of the neck.     Electronically signed by Anderson Lock MD   Report Date: 3/12/2024 3:30 PM     TECHNIQUE: Contrast-enhanced CT imaging of the neck utilizing 3 mm axial slices.   Coronal and sagittal reformats provided.     Contrast: 100 mL of Omnipaque 350 was administered  intravenously. No immediate   complication.     CT DLP DOSE: 269 (mGy.cm).     COMPARISON: Neck ultrasound 3/12/2024.     FINDINGS:     Airway: Patent and midline.     Tonsils: Punctate tonsillitis bilaterally.     Lymph nodes: Left posterolateral neck lobulated 2.4 x 1.3 cm soft tissue density   lesion located deep to the sternocleidomastoid (series 2, image 46). No   additional enlarged cervical lymph nodes identified.     Salivary glands: Normal.     Thyroid: Unremarkable.     Vasculature: Large vessels patent. Asymmetrically diminutive left vertebral   artery.     Orbits: Intact.     Paranasal sinuses: Scattered mucosal thickening predominating in the right   maxillary sinus.     Teeth: Largely intact.     Imaged intracranial structures: Unremarkable.     Osseous: Advanced multilevel cervical spondylosis. No suspicious lesion.     Soft tissues: Unremarkable.     IMPRESSION:   1. [place] lobulated 2.4 cm lesion in the left posterolateral neck remains   indeterminate. Lesion is amenable to ultrasound-guided core needle biopsy, which   should be considered for further evaluation.   2.  No additional enlarged cervical lymph nodes identified.       US THYROID 3/29/2024 9:40 AM     COMPARISON: None     HISTORY: Hyperthyroidism     FINDINGS:   Thyroid parenchyma: Diffusely heterogeneous thyroid parenchyma with  multifocal hypoechoic areas and increased color Doppler flow  bilaterally.  The right lobe of the thyroid measures: 2.0 x 1.7 x 5.8 cm  The left lobe of the thyroid measures: 1.7 x 1.3 x 4.8 cm  The thyroid isthmus measures: 0.2 cm     In the left neck at the level 5 station there is a well-circumscribed  avascular 2.7 x 1.2 x 2.5 cm heterogeneous mass that appears isoechoic  to adjacent musculature. This lesion is deep to and separate from the  visualized muscles.                                                                      Impression:  1. 2.7 cm heterogeneous avascular lesion in the left level 5  station  deep to the musculature, which may represent an enlarged lymph node  versus a neoplastic mass. Recommend further evaluation with  contrast-enhanced CT neck/MRI.  2. Findings suggestive of thyroiditis, recommend correlation with  thyroid function tests.       4/29/24  Final Dx    A.  Neck, left neck mass, needle core biopsy:  - Atypical lymphoid infiltrate; see comment     Comment:  Sections show an infiltrate composed of variably sized lymphocytes with mature chromatin and irregular nuclear outlines, set in a fibrotic background. Lymphocytes consist of numerous CD20 and [place] 5-positive B-cells with fewer admixed CD3-positive T lymphocytes. B-cells are negative for CD5, cyclin D1 and SOX11. A small subset of B cells demonstrate BCL6 and equivocal CD10 expression. No definitive BCL6 and BCL2 coexpression is identified.     Concurrent flow cytometric immunophenotyping is inconclusive due to suboptimal nature of the specimen.     Excisional/larger incisional biopsy of this mass would be helpful for a definitive diagnosis. Case is reviewed with colleague,     Narrative & Impression   Examination:  NM THYROID UPTAKE AND SCAN  I personally reviewed image.    Examination: Nuclear medicine thyroid uptake and scan, on 5/14/2024  11:44 AM.     Indication:  subclinical hyperthyroid; Abnormal thyroid function test     Additional Information: None.     Technique:     The patient received 249 uci of I-123 orally.  Uptake measurements and  scan was obtained at 24 hours.     Findings:     The uptake at 24 hours was measured at 46.4%.  The normal range at 24  hours is from 10 to 30%.  Uptake on right: 36.9%,  Uptake on Left: 9.6%.     Total computer estimated weight of the gland: 36.3 gm,  Right gland  weight: 26.4 gm,  Left gland weight: 9.9 gm.     Images of the gland demonstrates normal appearance of the right and  left lobes. No additional findings. No autonomous nodules were  identified.                                                                       Impression:  46.4% uptake at 24 hours. Diffusely increased uptake. The right lobe  of the thyroid is asymmetrically enlarged, similar to prior  ultrasound.     Lab Results   Component Value Date    WBC 5.1 05/14/2024     Lab Results   Component Value Date    TSH <0.01 05/14/2024      Free T4   Date Value Ref Range Status   05/14/2024 1.43 0.90 - 1.70 ng/dL Final       Latest Reference Range & Units 05/14/24 10:48   Free T3 2.0 - 4.4 pg/mL 3.9      Latest Reference Range & Units 03/29/24 09:59   ALT 0 - 70 U/L 12       ASSESSMENT/PLAN:   ##Subclinical hyperthyroidism, most likely from GD  ## Low WBC-->resolved  ## Left neck mass/ LN, Follow-up Dr. Allen, David GATES MD,PhD ENT  For thyrotoxicosis, thyroid uptake and scan showed diffuse I123 uptake 46% 5/2024.  Most likely Graves' disease. We discussed in length regarding treatment options. Given other ongoing health issues, favor medication. Pt is not interested in definitive treatment for now (methimazole), however, is open to cholestyramine.  -- cholestyramine 4 g BID  -- recheck labs 2-4 weeks.    Regarding neck mass, patient reports discussed with Dr. Allen and was recommended to see hematology oncology.  Pt would like to have care done at the . We discussed about additional tissue diagnosis, patient also would like to have it done at the  as well.  (Previous core biopsy show an infiltrate composed of variably sized lymphocytes with mature chromatin and irregular nuclear outlines, set in a fibrotic background. Lymphocytes consist of numerous CD20 and [place] 5-positive B-cells with fewer admixed CD3-positive T lymphocytes. B-cells are negative for CD5, cyclin D1 and SOX11. A small subset of B cells demonstrate BCL6 and equivocal CD10 expression. No definitive BCL6 and BCL2 coexpression is identified.  Concurrent flow cytometric immunophenotyping is inconclusive due to suboptimal nature of the specimen)    -- ENT  referral for excisional biopsy/ removal left neck mass  -- oncology referral for neck mass with abnormal lymphocytes      Follow-up 4-6 weeks    Orders Placed This Encounter   Procedures     Adult ENT  Referral     Adult Oncology/Hematology  Referral       40 minutes spent on the date of the encounter doing chart review, history and exam, documentation and further activities as noted above.        The longitudinal plan of care for the diagnosis(es)/condition(s) as documented were addressed during this visit. Due to the added complexity in care, I will continue to support Quincy in the subsequent management and with ongoing continuity of care.    Cedric Lopez MD          Again, thank you for allowing me to participate in the care of your patient.        Sincerely,        Cedric Lopez MD

## 2024-05-24 NOTE — PROGRESS NOTES
Audio-Visit Details    Type of service:  Video Visit  Video Start Time: 0906  Video End Time: 0929  Originating Location (pt. Location): [place], [place]  Distant Location (provider location):  Home  Platform used for Video Visit: Javier Lopez MD        HISTORY OF PRESENT ILLNESS  Quincy Keane is a 63 year old male  who is here for Follow-up thyroid concerns    Interval History  Pt had left sided mass biopsy.  Per Dr. Allen note 4/29/24  I called Clayton and went over the results. He was given the options of repeating the same test over (in hopes of a different outcome -- a definitive diagnosis) versus an excisional biopsy in the OR. Clayton preferred to give the core needle biopsy one more attempt. I think this is reasonable. We would like pursue an open biopsy if it does not give us the information we need. Please reach out to him to schedule.    Pt reported he is interested in excisional biopsy and would like to have it done through [facility]  Rashes are gone  No palpitation/shakiness  No eye pain/ double vision    Initial visit  Per record reviewed, careKendell Burroughs MD - 03/12/2024 8:00 AM CDT  Formatting of this note might be different from the original.  [facility] Office Visit  Subjective  Clayton is a 63 year old male who presents for Referral Endocrinology, Lump on left side of neck.    Clayton is a 63 y.o. M who presents to clinic today requesting referral for endocrinology.    He follows with dermatology in regards to a rash. He has had an erythematous patches with itch on his upper back and arms. These lesions were biopsied and consistent with contact dermatitis. He is not convinced with the diagnosis.    In his evaluation, he did also undergo laboratory testing. His TSH was significantly suppressed at that time. This is 2 years ago, and he never proceeded with follow-up.     Last couple years, had rash on the back.  Went to dermatologist--> recommend seeing allergist  Rash is intermittent,  gone during vacation.  Pt is wondering if rashes could be from extra heat and sweating from thyroid    Weight stable  Pt reports having irregular heart beat, asymptomatic. Report that his primary think it could be from thyroid, plan follow up 1-2 week  No shakiness  Sleep is find  No eye pain/ double vision  No smoking    Pt has thyroid problem for about 45 years, saw thyroid doctor and was told that he needed to take thyroid medication the rest of your life. Could not recall the name or how he took the medication.  Pt took thyroid medication and stopped 20 years ago  No changes in symptoms since stopped medication    Pt feels hot intermittently and rash  Mild headache, intermittently    Lump on the neck about 1 month, size stable, not growth  No mass on the front of neck  No problem swallowing/ breathing/ voice change    No h/o radiation Tx  No FH thyroid    Had CT with contrast 3/20/24 for neck mass, pt reported plan appointment 3/28/24 for the biopsy      No biotin/ hair skin nail supplement  Pt drinks herbal tea daily    REVIEW OF SYSTEMS  10 point negative except as mentioned in HPI    Past Medical/Surgical History:  H/o thyroid problem    Medications  Current Outpatient Medications   Medication Sig Dispense Refill    cholestyramine (QUESTRAN) 4 g packet Take 1 packet (4 g) by mouth 2 times daily (with meals) for 30 days 60 packet 0    methylPREDNISolone (MEDROL DOSEPACK) 4 MG tablet Take  by mouth See Admin Instructions. Use as directed (Patient not taking: Reported on 4/3/2024) 1 Package 0     No current facility-administered medications for this visit.       Allergies  No Known Allergies      Family History  family history is not on file.    Social History  Social History     Tobacco Use    Smoking status: Never    Smokeless tobacco: Never   Substance Use Topics    Alcohol use: Not on file       Physical Exam  GENERAL :  In no apparent distress  Neck: visible left posterior neck mass  EYES: No obvious  proptosis  RESP: Normal breathing  NEURO: awake, alert, responds appropriately to questions.      DATA REVIEW  Labs/Imaging  Thyroid Stimulating hormone  0.350 - 3.740 UIU/mL <0.007 Low    Resulting [facility] LABORATORY   Specimen Collected: 02/18/22  9:06 AM     Component  Ref Range & Units 2 wk ago   Thyroid Stimulating hormone  0.47 - 4.68 mIU/L <0.02 Low    Resulting [facility] LABORATORY   Specimen Collected: 03/12/24  9:00 AM     Component  Ref Range & Units 2 wk ago   T3, TOTAL  97 - 169 ng/dL 195 High    Resulting [facility] LABORATORY   Specimen Collected: 03/12/24  9:00 AM     Component  Ref Range & Units 2 wk ago   T4, Free  0.78 - 2.19 ng/dL 1.37   Resulting [facility] LABORATORY   Specimen Collected: 03/12/24  9:00 AM     PROCEDURE: US SOFT TISSUE MISC     HISTORY: Lump.     TECHNIQUE: Sonographic images were acquired of the left side of the neck   posteriorly at the site of the patient's lump.     COMPARISON: None.     IMPRESSION: At the site of the lump posteriorly in the left side of the neck,   just deep to the subcutaneous fat, there is a lobulated solid lesion measuring   2.8 x 2.4 x 1.5 cm. This is suspicious for a neoplastic process. This could be   an abnormal lymph node. Other etiologies are not excluded. Recommend further   evaluation with a contrast-enhanced CT of the neck.     Electronically signed by Anderson Lock MD   Report Date: 3/12/2024 3:30 PM     TECHNIQUE: Contrast-enhanced CT imaging of the neck utilizing 3 mm axial slices.   Coronal and sagittal reformats provided.     Contrast: 100 mL of Omnipaque 350 was administered intravenously. No immediate   complication.     CT DLP DOSE: 269 (mGy.cm).     COMPARISON: Neck ultrasound 3/12/2024.     FINDINGS:     Airway: Patent and midline.     Tonsils: Punctate tonsillitis bilaterally.     Lymph nodes: Left posterolateral neck lobulated 2.4 x 1.3  cm soft tissue density   lesion located deep to the sternocleidomastoid (series 2, image 46). No   additional enlarged cervical lymph nodes identified.     Salivary glands: Normal.     Thyroid: Unremarkable.     Vasculature: Large vessels patent. Asymmetrically diminutive left vertebral   artery.     Orbits: Intact.     Paranasal sinuses: Scattered mucosal thickening predominating in the right   maxillary sinus.     Teeth: Largely intact.     Imaged intracranial structures: Unremarkable.     Osseous: Advanced multilevel cervical spondylosis. No suspicious lesion.     Soft tissues: Unremarkable.     IMPRESSION:   1. [place] lobulated 2.4 cm lesion in the left posterolateral neck remains   indeterminate. Lesion is amenable to ultrasound-guided core needle biopsy, which   should be considered for further evaluation.   2.  No additional enlarged cervical lymph nodes identified.       US THYROID 3/29/2024 9:40 AM     COMPARISON: None     HISTORY: Hyperthyroidism     FINDINGS:   Thyroid parenchyma: Diffusely heterogeneous thyroid parenchyma with  multifocal hypoechoic areas and increased color Doppler flow  bilaterally.  The right lobe of the thyroid measures: 2.0 x 1.7 x 5.8 cm  The left lobe of the thyroid measures: 1.7 x 1.3 x 4.8 cm  The thyroid isthmus measures: 0.2 cm     In the left neck at the level 5 station there is a well-circumscribed  avascular 2.7 x 1.2 x 2.5 cm heterogeneous mass that appears isoechoic  to adjacent musculature. This lesion is deep to and separate from the  visualized muscles.                                                                      Impression:  1. 2.7 cm heterogeneous avascular lesion in the left level 5 station  deep to the musculature, which may represent an enlarged lymph node  versus a neoplastic mass. Recommend further evaluation with  contrast-enhanced CT neck/MRI.  2. Findings suggestive of thyroiditis, recommend correlation with  thyroid function tests.        4/29/24  Final Dx    A.  Neck, left neck mass, needle core biopsy:  - Atypical lymphoid infiltrate; see comment     Comment:  Sections show an infiltrate composed of variably sized lymphocytes with mature chromatin and irregular nuclear outlines, set in a fibrotic background. Lymphocytes consist of numerous CD20 and [place] 5-positive B-cells with fewer admixed CD3-positive T lymphocytes. B-cells are negative for CD5, cyclin D1 and SOX11. A small subset of B cells demonstrate BCL6 and equivocal CD10 expression. No definitive BCL6 and BCL2 coexpression is identified.     Concurrent flow cytometric immunophenotyping is inconclusive due to suboptimal nature of the specimen.     Excisional/larger incisional biopsy of this mass would be helpful for a definitive diagnosis. Case is reviewed with colleague,     Narrative & Impression   Examination:  NM THYROID UPTAKE AND SCAN  I personally reviewed image.    Examination: Nuclear medicine thyroid uptake and scan, on 5/14/2024  11:44 AM.     Indication:  subclinical hyperthyroid; Abnormal thyroid function test     Additional Information: None.     Technique:     The patient received 249 uci of I-123 orally.  Uptake measurements and  scan was obtained at 24 hours.     Findings:     The uptake at 24 hours was measured at 46.4%.  The normal range at 24  hours is from 10 to 30%.  Uptake on right: 36.9%,  Uptake on Left: 9.6%.     Total computer estimated weight of the gland: 36.3 gm,  Right gland  weight: 26.4 gm,  Left gland weight: 9.9 gm.     Images of the gland demonstrates normal appearance of the right and  left lobes. No additional findings. No autonomous nodules were  identified.                                                                      Impression:  46.4% uptake at 24 hours. Diffusely increased uptake. The right lobe  of the thyroid is asymmetrically enlarged, similar to prior  ultrasound.     Lab Results   Component Value Date    WBC 5.1 05/14/2024     Lab  Results   Component Value Date    TSH <0.01 05/14/2024      Free T4   Date Value Ref Range Status   05/14/2024 1.43 0.90 - 1.70 ng/dL Final       Latest Reference Range & Units 05/14/24 10:48   Free T3 2.0 - 4.4 pg/mL 3.9      Latest Reference Range & Units 03/29/24 09:59   ALT 0 - 70 U/L 12       ASSESSMENT/PLAN:   ##Subclinical hyperthyroidism, most likely from GD  ## Low WBC-->resolved  ## Left neck mass/ LN, Follow-up Dr. Allen, David GATES MD,PhD ENT--> Addendum 5/29/24: Biopsied 5/20/24 showed Follicular large B-cell lymphoma  For thyrotoxicosis, thyroid uptake and scan showed diffuse I123 uptake 46% 5/2024.  Most likely Graves' disease. We discussed in length regarding treatment options. Given other ongoing health issues, favor medication. Pt is not interested in definitive treatment for now (methimazole), however, is open to cholestyramine.  -- cholestyramine 4 g BID  -- recheck labs 2-4 weeks.    Regarding neck mass, patient reports discussed with Dr. Allen and was recommended to see hematology oncology.  Pt would like to have care done at the . We discussed about additional tissue diagnosis, patient also would like to have it done at the  as well.  (Previous core biopsy show an infiltrate composed of variably sized lymphocytes with mature chromatin and irregular nuclear outlines, set in a fibrotic background. Lymphocytes consist of numerous CD20 and [place] 5-positive B-cells with fewer admixed CD3-positive T lymphocytes. B-cells are negative for CD5, cyclin D1 and SOX11. A small subset of B cells demonstrate BCL6 and equivocal CD10 expression. No definitive BCL6 and BCL2 coexpression is identified.  Concurrent flow cytometric immunophenotyping is inconclusive due to suboptimal nature of the specimen)    -- ENT referral for excisional biopsy/ removal left neck mass--> cancelled 5/29/24  -- oncology referral for neck mass with abnormal lymphocytes --> pt is currently scheduled for 6/13/24, will  touch base with oncology if he can be seen sooner since we now have a tissue diagnosis      Follow-up 4-6 weeks    Orders Placed This Encounter   Procedures    Adult ENT  Referral    Adult Oncology/Hematology  Referral       40 minutes spent on the date of the encounter doing chart review, history and exam, documentation and further activities as noted above.        The longitudinal plan of care for the diagnosis(es)/condition(s) as documented were addressed during this visit. Due to the added complexity in care, I will continue to support Quincy in the subsequent management and with ongoing continuity of care.    Cedric Lopez MD      05/29/24   Called and LVM for Dr. Allen.  Discussed with Dr. Allen, pt had 2nd biopsy and showed lymphoma.  Will cancel ENT referral. Pt already had appointment scheduled with heme/onc   9 d ago    Case Report Surgical Pathology Report                         Case: [id number]                                Authorizing Provider:  David Allen,  Collected:           05/20/2024 1301                                     MD,PhD                                                                      Ordering Location:     [facility] Received:            05/20/2024 1305                                     RADIOLOGY ULTRASOUND                                                        Pathologist:           Juan Diego Cuellar MD                                                          Specimen:    Neck                                                                                   Final Dx    Lymph node, neck, ultrasound-guided needle core biopsy:  Follicular large B-cell lymphoma (follicular lymphoma, grade 3b), see comment     Comment: Sections show multiple needle core biopsies with a vaguely nodular infiltrate of large atypical lymphoid cells having vesicular irregular nuclei and eosinophilic cytoplasm in a sclerotic fibrous  background.  Scattered small lymphocytes are also present.      A panel of immunostains confirms a malignant nodular B-cell infiltrate positive for CD20, PAX5, CD10, BCL-2, BCL-6 (weak), and MUM1 (variable) and negative for CD5 and cyclin-D1. CD3 and CD5 stain background small T-cells.  CD21 highlights irregular follicular dendritic meshworks that correspond to the B-cell infiltrate.   A MYC immunostain is positive in 10% or less of the B-cells. An AE1.3 immunostain is negative.      Flow cytometry performed on a sample of this specimen at [facility] ([id number]) detected a CD10-positive monoclonal B-cell population with lambda light chain restriction in a background of polytypic B-cells.     Touch imprints from this specimen were sent to [facility] ([facility]) for MYC, IGH/BCL-2, BCL-6, and IRF4 rearrangement analyses by FISH.  An IGH/BCL2 rearrangement was detected as the sole abnormality.  See the attached [facility] cytogenetics report for more details.      Altogether, the morphologic, immunophenotypic, and FISH results are diagnostic for a follicular large B-cell lymphoma (WHO)/ grade 3b follicular lymphoma (International consensus classification, ICC).     Diagnosis reported to Dr. Shubham Ya on 5/23/2024 at 1:30 PM.

## 2024-05-28 ENCOUNTER — PATIENT OUTREACH (OUTPATIENT)
Dept: ONCOLOGY | Facility: CLINIC | Age: 64
End: 2024-05-28
Payer: COMMERCIAL

## 2024-05-28 NOTE — PROGRESS NOTES
New Patient Oncology Nurse Navigator Note     Referring provider: Cedric Lopez MD      Referring Clinic/Organization: [facility] Endocrinology      Referred to (specialty:) Medical Onocology    Requested provider (if applicable): NA     Date Referral Received: May 28, 2024     Evaluation for:  R22.1 (ICD-10-CM) - Neck mass   H/o abnormal core biopsy Left neck mass, pending excisional biospy.      Clinical History (per Nurse review of records provided):      Patient was seen on 5/24/24 by Endocrinology for follow up on thyroid concerns.     US Soft tissue done on 3/12/24 at [facility]:     IMPRESSION: At the site of the lump posteriorly in the left side of the neck,   just deep to the subcutaneous fat, there is a lobulated solid lesion measuring   2.8 x 2.4 x 1.5 cm. This is suspicious for a neoplastic process. This could be   an abnormal lymph node. Other etiologies are not excluded. Recommend further   evaluation with a contrast-enhanced CT of the neck.     CT Neck done at [facility] on 3/20/24:     IMPRESSION:   1. [place] lobulated 2.4 cm lesion in the left posterolateral neck remains   indeterminate. Lesion is amenable to ultrasound-guided core needle biopsy, which   should be considered for further evaluation.   2.  No additional enlarged cervical lymph nodes identified.     US of Thyroid done on 3/29/24:      FINDINGS:   Thyroid parenchyma: Diffusely heterogeneous thyroid parenchyma with  multifocal hypoechoic areas and increased color Doppler flow  bilaterally.  The right lobe of the thyroid measures: 2.0 x 1.7 x 5.8 cm  The left lobe of the thyroid measures: 1.7 x 1.3 x 4.8 cm  The thyroid isthmus measures: 0.2 cm     In the left neck at the level 5 station there is a well-circumscribed  avascular 2.7 x 1.2 x 2.5 cm heterogeneous mass that appears isoechoic  to adjacent musculature. This lesion is deep to and separate from the  visualized muscles.    4/29/24  US guided FNA:           Final Dx    A.  Neck, left neck mass, needle core biopsy:  - Atypical lymphoid infiltrate; see comment     Comment:  Sections show an infiltrate composed of variably sized lymphocytes with mature chromatin and irregular nuclear outlines, set in a fibrotic background. Lymphocytes consist of numerous CD20 and [place] 5-positive B-cells with fewer admixed CD3-positive T lymphocytes. B-cells are negative for CD5, cyclin D1 and SOX11. A small subset of B cells demonstrate BCL6 and equivocal CD10 expression. No definitive BCL6 and BCL2 coexpression is identified.     Concurrent flow cytometric immunophenotyping is inconclusive due to suboptimal nature of the specimen.     Excisional/larger incisional biopsy of this mass would be helpful for a definitive diagnosis. Case is reviewed with colleague, SAW.   Electronically signed by Gilmer Casey MD on 5/2/2024 at  4:34 PM                                                                5/20/24 Excisional Biopsy done:     Final Dx    Lymph node, neck, ultrasound-guided needle core biopsy:  Follicular large B-cell lymphoma (follicular lymphoma, grade 3b), see comment     Comment: Sections show multiple needle core biopsies with a vaguely nodular infiltrate of large atypical lymphoid cells having vesicular irregular nuclei and eosinophilic cytoplasm in a sclerotic fibrous background.  Scattered small lymphocytes are also present.      A panel of immunostains confirms a malignant nodular B-cell infiltrate positive for CD20, PAX5, CD10, BCL-2, BCL-6 (weak), and MUM1 (variable) and negative for CD5 and cyclin-D1. CD3 and CD5 stain background small T-cells.  CD21 highlights irregular follicular dendritic meshworks that correspond to the B-cell infiltrate.   A MYC immunostain is positive in 10% or less of the B-cells. An AE1.3 immunostain is negative.      Flow cytometry performed on a sample of this specimen at  ([id number]) detected a  CD10-positive monoclonal B-cell population with lambda light chain restriction in a background of polytypic B-cells.     Touch imprints from this specimen were sent to [facility] ([facility]) for MYC, IGH/BCL-2, BCL-6, and IRF4 rearrangement analyses by FISH.  An IGH/BCL2 rearrangement was detected as the sole abnormality.  See the attached [facility] cytogenetics report for more details.      Altogether, the morphologic, immunophenotypic, and FISH results are diagnostic for a follicular large B-cell lymphoma (WHO)/ grade 3b follicular lymphoma (International consensus classification, ICC).     Diagnosis reported to Dr. Shubham Ya on 5/23/2024 at 1:30 PM.      Reviewed by colleague ALFONZO who concurs.    Addendum electronically signed by Juan Diego Cuellar MD on 5/24/2024 at 11:50 AM Electronically signed by Juan Diego Cuellar MD on 5/23/2024 at  1:40 PM        Records Location: See Bookmarked material     Records Needed:     Path reports-biopsy and surgery (as applicable)  Pathology reviews 5/20/24   Component 8 d ago   Case Report Surgical Pathology Report                         Case: [id number]                                Authorizing Provider:  David Allen,  Collected:           05/20/2024 1301                                     MD,PhD                                                                      Ordering Location:     [facility] Received:            05/20/2024 1305                                     RADIOLOGY ULTRASOUND                                                        Pathologist:           Juan Diego Cuellar MD                                                          Specimen:    Neck                                 Systemic imaging (as applicable)  Med onc notes, rad notes, OP note, surgeons note (as applicable)  Genetic results (as applicable)  Echo or MUGA results (as applicable)  Radiation summary (as applicable)  Chemotherapy summary(as applicable)        Additional testing needed prior to consult: ? Message sent to consulting MD.     Payor: BCBS / Plan: [place] / Product Type: Indemnity /     May 28, 2024    Called patient to introduced myself and role as nurse navigator with "[employer] Hematology/Oncology department and to inform them that we have received the referral for a diagnosis of Follicular Lymphoma  from Dr. Lopez. Patient confirms they are aware of the referral and ready to schedule. Provided them with contact information for NPS and encourage them to call with any questions. Patient verbalized understanding of plan. Transferred to NPS.    May 31, 2024  Message received from Dr. Peñaloza.  She would like patient to have labs, Echo and PET scan done prior to seeing him in consultation on 6/13/24. Called patient to review and discuss. Patient did not answer his phone and unable to leave Voicemail. Will follow up on Monday. Message sent to NPS to coordinate.    Anamika 3, 2024  Called patient again this morning in follow up or pre-consult orders placed by Dr. Peñaloza.  Phone keeps ringing and then goes to voicemail but voicemail is not set up so unable to leave a message.   Will try again later today.     Anamika 3, 2024  Called patient this afternoon again in follow up. Was able to connect with patient. Explained the request for the pre-consult work up. Patient agreeable at this time. Sent to NPS to schedule.      Hilary Malik, RN, BSN  [facility] Hematology/Oncology Nurse Navigator  [phone]

## 2024-05-31 DIAGNOSIS — C82.41 GRADE 3B FOLLICULAR LYMPHOMA OF LYMPH NODES OF NECK (H): Primary | ICD-10-CM

## 2024-06-10 ENCOUNTER — HOSPITAL ENCOUNTER (OUTPATIENT)
Dept: CARDIOLOGY | Facility: CLINIC | Age: 64
Discharge: HOME | End: 2024-06-10
Attending: STUDENT IN AN ORGANIZED HEALTH CARE EDUCATION/TRAINING PROGRAM
Payer: COMMERCIAL

## 2024-06-10 ENCOUNTER — HOSPITAL ENCOUNTER (OUTPATIENT)
Dept: PET IMAGING | Facility: CLINIC | Age: 64
Discharge: HOME | End: 2024-06-10
Attending: STUDENT IN AN ORGANIZED HEALTH CARE EDUCATION/TRAINING PROGRAM
Payer: COMMERCIAL

## 2024-06-10 VITALS — BODY MASS INDEX: 28.04 KG/M2 | WEIGHT: 185 LBS | HEIGHT: 68 IN

## 2024-06-10 DIAGNOSIS — C82.41 GRADE 3B FOLLICULAR LYMPHOMA OF LYMPH NODES OF NECK (H): ICD-10-CM

## 2024-06-10 LAB — 3D LVEF ECHO: NORMAL

## 2024-06-10 PROCEDURE — 343N000001 HC RX 343: Performed by: STUDENT IN AN ORGANIZED HEALTH CARE EDUCATION/TRAINING PROGRAM

## 2024-06-10 PROCEDURE — A9552 F18 FDG: HCPCS | Performed by: STUDENT IN AN ORGANIZED HEALTH CARE EDUCATION/TRAINING PROGRAM

## 2024-06-10 PROCEDURE — 74177 CT ABD & PELVIS W/CONTRAST: CPT | Mod: 26 | Performed by: RADIOLOGY

## 2024-06-10 PROCEDURE — 78816 PET IMAGE W/CT FULL BODY: CPT | Mod: 26 | Performed by: RADIOLOGY

## 2024-06-10 PROCEDURE — 93306 TTE W/DOPPLER COMPLETE: CPT

## 2024-06-10 PROCEDURE — 76376 3D RENDER W/INTRP POSTPROCES: CPT | Mod: 26 | Performed by: INTERNAL MEDICINE

## 2024-06-10 PROCEDURE — 250N000011 HC RX IP 250 OP 636: Performed by: STUDENT IN AN ORGANIZED HEALTH CARE EDUCATION/TRAINING PROGRAM

## 2024-06-10 PROCEDURE — 71260 CT THORAX DX C+: CPT

## 2024-06-10 PROCEDURE — 71260 CT THORAX DX C+: CPT | Mod: 26 | Performed by: RADIOLOGY

## 2024-06-10 PROCEDURE — 78816 PET IMAGE W/CT FULL BODY: CPT | Mod: PI

## 2024-06-10 PROCEDURE — 93306 TTE W/DOPPLER COMPLETE: CPT | Mod: 26 | Performed by: INTERNAL MEDICINE

## 2024-06-10 RX ORDER — FLUDEOXYGLUCOSE F 18 200 MCI/ML
1-18 INJECTION, SOLUTION INTRAVENOUS ONCE
Status: COMPLETED | OUTPATIENT
Start: 2024-06-10 | End: 2024-06-10

## 2024-06-10 RX ORDER — IOPAMIDOL 755 MG/ML
50-135 INJECTION, SOLUTION INTRAVASCULAR ONCE
Status: COMPLETED | OUTPATIENT
Start: 2024-06-10 | End: 2024-06-10

## 2024-06-10 RX ADMIN — IOPAMIDOL 114 ML: 755 INJECTION, SOLUTION INTRAVENOUS at 12:05

## 2024-06-10 RX ADMIN — FLUDEOXYGLUCOSE F 18 10.9 MILLICURIE: 200 INJECTION, SOLUTION INTRAVENOUS at 11:05

## 2024-06-13 ENCOUNTER — PRE VISIT (OUTPATIENT)
Dept: ONCOLOGY | Facility: CLINIC | Age: 64
End: 2024-06-13
Payer: COMMERCIAL

## 2024-06-13 ENCOUNTER — ONCOLOGY VISIT (OUTPATIENT)
Dept: ONCOLOGY | Facility: CLINIC | Age: 64
End: 2024-06-13
Attending: INTERNAL MEDICINE
Payer: COMMERCIAL

## 2024-06-13 ENCOUNTER — PATIENT OUTREACH (OUTPATIENT)
Dept: ONCOLOGY | Facility: CLINIC | Age: 64
End: 2024-06-13
Payer: COMMERCIAL

## 2024-06-13 VITALS
HEART RATE: 94 BPM | HEIGHT: 66 IN | TEMPERATURE: 98.2 F | DIASTOLIC BLOOD PRESSURE: 81 MMHG | WEIGHT: 171.9 LBS | BODY MASS INDEX: 27.63 KG/M2 | RESPIRATION RATE: 16 BRPM | OXYGEN SATURATION: 98 % | SYSTOLIC BLOOD PRESSURE: 154 MMHG

## 2024-06-13 DIAGNOSIS — C82.41 GRADE 3B FOLLICULAR LYMPHOMA OF LYMPH NODES OF NECK (H): Primary | ICD-10-CM

## 2024-06-13 DIAGNOSIS — I48.20 CHRONIC ATRIAL FIBRILLATION (H): ICD-10-CM

## 2024-06-13 DIAGNOSIS — Z51.11 ENCOUNTER FOR ANTINEOPLASTIC CHEMOTHERAPY: ICD-10-CM

## 2024-06-13 DIAGNOSIS — R22.1 NECK MASS: ICD-10-CM

## 2024-06-13 PROCEDURE — 93005 ELECTROCARDIOGRAM TRACING: CPT | Mod: RTG

## 2024-06-13 PROCEDURE — 99214 OFFICE O/P EST MOD 30 MIN: CPT | Performed by: STUDENT IN AN ORGANIZED HEALTH CARE EDUCATION/TRAINING PROGRAM

## 2024-06-13 PROCEDURE — 99213 OFFICE O/P EST LOW 20 MIN: CPT | Performed by: STUDENT IN AN ORGANIZED HEALTH CARE EDUCATION/TRAINING PROGRAM

## 2024-06-13 PROCEDURE — 93010 ELECTROCARDIOGRAM REPORT: CPT | Performed by: INTERNAL MEDICINE

## 2024-06-13 RX ORDER — PROCHLORPERAZINE MALEATE 10 MG
10 TABLET ORAL EVERY 6 HOURS PRN
Qty: 30 TABLET | Refills: 2 | Status: SHIPPED | OUTPATIENT
Start: 2024-06-13

## 2024-06-13 RX ORDER — PREDNISONE 50 MG/1
50 TABLET ORAL 2 TIMES DAILY
Qty: 10 TABLET | Refills: 7 | Status: SHIPPED | OUTPATIENT
Start: 2024-06-13 | End: 2024-06-18

## 2024-06-13 RX ORDER — ACYCLOVIR 400 MG/1
400 TABLET ORAL EVERY 12 HOURS
Qty: 60 TABLET | Refills: 11 | Status: SHIPPED | OUTPATIENT
Start: 2024-06-13 | End: 2024-08-22

## 2024-06-13 RX ORDER — BETAMETHASONE DIPROPIONATE 0.05 %
OINTMENT (GRAM) TOPICAL
COMMUNITY
Start: 2022-11-23 | End: 2024-07-24

## 2024-06-13 RX ORDER — ONDANSETRON 8 MG/1
8 TABLET, FILM COATED ORAL EVERY 8 HOURS PRN
Qty: 30 TABLET | Refills: 2 | Status: SHIPPED | OUTPATIENT
Start: 2024-06-13 | End: 2024-07-24

## 2024-06-13 RX ORDER — ALLOPURINOL 300 MG/1
300 TABLET ORAL DAILY
Qty: 14 TABLET | Refills: 0 | Status: SHIPPED | OUTPATIENT
Start: 2024-06-13 | End: 2024-06-27

## 2024-06-13 ASSESSMENT — PAIN SCALES - GENERAL: PAINLEVEL: NO PAIN (0)

## 2024-06-13 NOTE — PROGRESS NOTES
"[facility]: Cancer Care Initial Note                                    Discussion with Patient:                                                      Met with Quincy after office visit with Dr. Peñaloza. Introduced self as RN Care Coordinator. Provided [facility] Guidebook folder and discussed included materials with patient. Distributed business cards and contact information for [facility].     Discussed roles of RNCC, MD, MARIBEL, nurse triage line, and clinic coordinators. Discussed how to get in contact with different team members via [employer] for non emergency questions and at [phone], which has options to talk with a Nurse available 24/7. Sent patient [employer] sign up text.    Provided overview of supportive services at [facility] including SW, Cancer Rehab, Cancer Survivorship, oncology dietitian, and oncology behavioral health providers (psychology, psychiatry, counseling).    Discussed chemo education and what to expect at infusion appointments. Provided printed literature on R-CHOP. Reviewed pertinent sections of [facility] Cancer Care Guidebook, including \"Getting Ready for Chemotherapy\". Reviewed possible side effects, when to contact your doctor or health care provider, and self care tips sections. Reviewed treatment schedule including cycle length, lab monitoring, and prn medications.    Auth to Discuss PHI form not completed per patient preference. Would like to fill out at home.    Patient voiced understanding and appreciation of above information and denies any further questions.       Goals          General     Other (pt-stated)      Notes - Note created  6/13/2024  4:28 PM by Shikha Pierre RN     Goal Statement: I will use my clinic and care team resources as directed.  Date Goal set: 6/13/2024  Barriers: disease burden  Strengths: motivation and health awareness  Date to Achieve By: ongoing  Patient expressed understanding of goal: Yes  Action steps to achieve this goal:  I will " contact triage with new, worsening or uncontrolled symptoms.   I will contact triage with temperature over 100.4  I will call with difficulties of scheduling and/or transportation.   I will request needed refills when there are 7 days of medication remaining.   I will not send urgent or symptomatic messages through [employer].   I will contact scheduling to arrange or make changes in my appointments.                Assessment:                                                      Initial  Current living arrangement:: I live in a private home  Informal Support system:: None  Equipment Currently Used at Home: none  Bed or wheelchair confined:: No  Mobility Status: Independent  Transportation means:: Regular car  Referrals Placed: None    Plan of Care Education Review:   Assessment completed with:: Patient    Plan of Care Education   Yearly learning assessment completed?: Yes (see Education tab)  Diagnosis:: Follicular lymphoma  Does patient understand diagnosis?: Yes  Tx plan/regimen:: R-CHOP  Does patient understand treatment plan/regimen?: Yes  Preparing for treatment:: Reviewed treatment preparation information with patient (vascular access, day of chemo, visitor policy, what to bring, etc.)  Vascular access education provided for:: Peripheral IV  Side effect education:: Diarrhea/Constipation;Fatigue;Hair loss;Immune-mediated effects;Infection;Lab value monitoring (anemia, neutropenia, thrombocytopenia);Mouth sores;Mylosuppression;Nausea/Vomiting;Neuropathy  Safety/self care at home reviewed with patient:: Yes  Coping - concerns/fears reviewed with patient:: Yes  Plan of Care:: MARIBEL follow-up appointment;Lab appointment;Treatment schedule  When to call provider:: Bleeding;Increased shortness of breath;New/worsening pain;Shaking chills;Temperature >100.4F;Uncontrolled diarrhea/constipation;Uncontrolled nausea/vomiting    Evaluation of Learning  Patient Education Provided: Yes  Readiness:: Eager;Acceptance  Method::  Booklet/Handout;Literature;Explanation  Response:: Verbalizes understanding           Intervention/Education provided during outreach:                                                           Further POC:     - Labs, MARIBEL, infusion of R-CHOP within 7-10 days     Patient verbalizes understanding and has no questions or concerns at this time. Has contact information for [facility] if they have any further needs.    Shikha Pierre, BSN, RN  RN Care Coordinator   [phone]

## 2024-06-13 NOTE — NURSING NOTE
"Oncology Rooming Note    June 13, 2024 2:24 PM   Quincy Keane is a 63 year old male who presents for:    Chief Complaint   Patient presents with    Oncology Clinic Visit     Follicular lymphoma      Initial Vitals: BP (!) 154/81 (BP Location: Right arm, Patient Position: Sitting, Cuff Size: Adult Regular)   Pulse 94   Temp 98.2  F (36.8  C) (Oral)   Resp 16   Ht 1.669 m (5' 5.71\")   Wt 78 kg (171 lb 14.4 oz)   SpO2 98%   BMI 27.99 kg/m   Estimated body mass index is 27.99 kg/m  as calculated from the following:    Height as of this encounter: 1.669 m (5' 5.71\").    Weight as of this encounter: 78 kg (171 lb 14.4 oz). Body surface area is 1.9 meters squared.  No Pain (0) Comment: Data Unavailable   No LMP for male patient.  Allergies reviewed: Yes  Medications reviewed: Yes    Medications: Medication refills not needed today.  Pharmacy name entered into [facility]: [id number] - [place], [place] - [address]    Frailty Screening:   Is the patient here for a new oncology consult visit in cancer care? 1. Yes. Over the past month, have you experienced difficulty or required a caregiver to assist with:   1. Balance, walking or general mobility (including any falls)? NO  2. Completion of self-care tasks such as bathing, dressing, toileting, grooming/hygiene?  NO  3. Concentration or memory that affects your daily life?  NO       Clinical concerns: none    Ngoc Warner"

## 2024-06-13 NOTE — TELEPHONE ENCOUNTER
Imaging Received  June 13, 2024 11:35 AM ABT   Action: Images from RV received and resolved to PACS.       RECORDS STATUS - ALL OTHER DIAGNOSIS      RECORDS RECEIVED FROM: [facility], [place]   NOTES STATUS DETAILS   OFFICE NOTE from referring provider Epic 05/24/24: Dr. Cedric Lopez   OFFICE NOTE from other specialist -[facility] 04/03/24: Dr. David Allen   MEDICATION LIST [facility]    LABS     PATHOLOGY REPORTS Req 06/12-RV 05/20/24: [id number]  04/29/24: [id number]   ANYTHING RELATED TO DIAGNOSIS Epic Most recent 06/10/24   PATHOLOGY [employer] TRACKING   [place] TRACKING #: [id number]   IMAGING (NEED IMAGES & REPORT)     CT SCANS PACS PACS:  06/10/24: CT CAP    RV:  03/20/24: CT Neck   NM PACS PACS:  05/13/24: NM Thyroid   ULTRASOUND PACS RV:  05/20/24, 04/29/24: US Needle Neck bx  03/12/24: US Soft Tissue     PACS:  03/29/24: US Thyroid   PET PACS PACS:  06/10/24: PET Onc Whole Body

## 2024-06-13 NOTE — PROGRESS NOTES
"    Judi Mathew is a 63 year old, presenting for the following health issues:  Oncology Clinic Visit (Follicular lymphoma )    HPI     Oncology History Overview Note   This is a 62 y/o male with PMH of atrial fibrillation, thyroid dysfunction and probable dermatitis, comes in with newly diagnosed follicular lymphoma.     He was otherwise doing well until he developed neck lump on the left side about 3 months ago. No fevers, chills, night sweat or weight loss. He was asymptomatic from the lymph node. Initial needle biopsy showed atypical lymphoid infiltrate. Subsequent core needle biopsy showed follicular lymphoma grade 3B. IGH/BCL2 rearrangement was positive. No BCL6 or MYC rearrangement. He has not noticed any lumps anywhere else. PET scan showed hypermetabolic left upper cervical lymph ndoes and subcarinal lymph node. Overall stage II, non bulky disease. LDH within normal limits. smIPI at least 2.   EF >50%  He has rate controlled atrial fibrillation.     He comes today to [facility] follow up.     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 No fevers, chills, night sweats or weight loss, no lymphadenopathy. No pain.        Objective    BP (!) 154/81 (BP Location: Right arm, Patient Position: Sitting, Cuff Size: Adult Regular)   Pulse 94   Temp 98.2  F (36.8  C) (Oral)   Resp 16   Ht 1.669 m (5' 5.71\")   Wt 78 kg (171 lb 14.4 oz)   SpO2 98%   BMI 27.99 kg/m    Body mass index is 27.99 kg/m .  Physical Exam   GENERAL:  Alert oriented well nourished  HEAD: normocephalic atraumatic  SKIN:  no rash, hives, other lesions.  LYMPHATIC: no abnormal lymph nodes palable in axillary, supraclavicular or " inguinal area. Left upper cervical lymph node about 3-4 cm.  RESP:  No rales or rhonchi, breath sounds bilaterally equal and vesicular  CV:  No tachycardia, S1 S2 normal No murmur.  GI:  Abdomen soft nontender no hepato or splenomegaly  MUSCULOSKELETAL:  No visible joint redness or swelling.  NEURO:  No gross weakness gait normal  PSYCH: pleasant affect    Labs: I personally reviewed complete blood count, differential, renal function test, liver function tests LDH.  No cytopenias, LFTs within normal limits, renal function test within normal limits and LDH is normal as well. TSH below normal with normal t4, suggestive of subclinical hyperthyroidism.    Imaging: PETCT scan reviewed with the patient, overall stage II disease.    Assessment and Plan:    1)  High grade follicular lymphoma 3B:  - I reviewed natural history, prognosis, management of follicular lymphoma 3B with the patient. I explained that treatment of FL 3B is in line with the DLBCL. Multiagent chemotherapy with rituximab, cyclophosphamide, doxorubicin, vincristine and prednisone is the treatment of choice. Regimen dosings, schedule, common toxicities were discussed with the patient.  Common toxicities including but not limited to nausea vomiting diarrhea rashes neuropathy was discussed.  Patient verbalized understanding of the information and all his questions were answered.  Patient consented for chemotherapy.       - Acyclovir, allopurinol, prednisone, antiemetics prescribed. Plan to begin in 7-10 days.    2) Atrial fibrillation:  - His HR is a bit high in the office. He is not on any medication. Atrial fibrillation is not an indication to not give anthracycline, but I will add carvedilol .125 mg BID for cardioprotection. Referral to cardiology.    3) Hyperthyroidism:  - Will reach out to his endocrinologist regarding any medications for his hyperthryroidism.    Total time spent on date of service in review of medical records, review of labs, history  taking, physical exam, discussion of assessment and plan, counseling and patient education is 30 minutes.    Avelina Peñaloza MD  Attending Physician  Pager [phone]                Signed Electronically by: Avelina Peñaloza MD

## 2024-06-13 NOTE — LETTER
"6/13/2024      Quincy Keane  [address]  [facility] [address]      Dear Colleague,    Thank you for referring your patient, Quincy Keane, to the [facility]. Please see a copy of my visit note below.        Judi Mathew is a 63 year old, presenting for the following health issues:  Oncology Clinic Visit (Follicular lymphoma )    HPI     Oncology History Overview Note   This is a 64 y/o male with PMH of atrial fibrillation, thyroid dysfunction and probable dermatitis, comes in with newly diagnosed follicular lymphoma.     He was otherwise doing well until he developed neck lump on the left side about 3 months ago. No fevers, chills, night sweat or weight loss. He was asymptomatic from the lymph node. Initial needle biopsy showed atypical lymphoid infiltrate. Subsequent core needle biopsy showed follicular lymphoma grade 3B. IGH/BCL2 rearrangement was positive. No BCL6 or MYC rearrangement. He has not noticed any lumps anywhere else. PET scan showed hypermetabolic left upper cervical lymph ndoes and subcarinal lymph node. Overall stage II, non bulky disease. LDH within normal limits. smIPI at least 2.   EF >50%  He has rate controlled atrial fibrillation.     He comes today to [facility] follow up.     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 No fevers, chills, night sweats or weight loss, no lymphadenopathy. No pain.        Objective   BP (!) 154/81 (BP Location: Right arm, Patient Position: Sitting, Cuff Size: Adult Regular)   Pulse 94   Temp 98.2  F (36.8  C) (Oral)   Resp 16   Ht 1.669 m (5' 5.71\")   Wt 78 kg (171 lb 14.4 oz)   SpO2 98%   BMI 27.99 kg/m    Body " mass index is 27.99 kg/m .  Physical Exam   GENERAL:  Alert oriented well nourished  HEAD: normocephalic atraumatic  SKIN:  no rash, hives, other lesions.  LYMPHATIC: no abnormal lymph nodes palable in axillary, supraclavicular or inguinal area. Left upper cervical lymph node about 3-4 cm.  RESP:  No rales or rhonchi, breath sounds bilaterally equal and vesicular  CV:  No tachycardia, S1 S2 normal No murmur.  GI:  Abdomen soft nontender no hepato or splenomegaly  MUSCULOSKELETAL:  No visible joint redness or swelling.  NEURO:  No gross weakness gait normal  PSYCH: pleasant affect    Labs: I personally reviewed complete blood count, differential, renal function test, liver function tests LDH.  No cytopenias, LFTs within normal limits, renal function test within normal limits and LDH is normal as well. TSH below normal with normal t4, suggestive of subclinical hyperthyroidism.    Imaging: PETCT scan reviewed with the patient, overall stage II disease.    Assessment and Plan:    1)  High grade follicular lymphoma 3B:  - I reviewed natural history, prognosis, management of follicular lymphoma 3B with the patient. I explained that treatment of FL 3B is in line with the DLBCL. Multiagent chemotherapy with rituximab, cyclophosphamide, doxorubicin, vincristine and prednisone is the treatment of choice. Regimen dosings, schedule, common toxicities were discussed with the patient.  Common toxicities including but not limited to nausea vomiting diarrhea rashes neuropathy was discussed.  Patient verbalized understanding of the information and all his questions were answered.  Patient consented for chemotherapy.       - Acyclovir, allopurinol, prednisone, antiemetics prescribed. Plan to begin in 7-10 days.    2) Atrial fibrillation:  - His HR is a bit high in the office. He is not on any medication. Atrial fibrillation is not an indication to not give anthracycline, but I will add carvedilol .125 mg BID for cardioprotection.  Referral to cardiology.    3) Hyperthyroidism:  - Will reach out to his endocrinologist regarding any medications for his hyperthryroidism.    Total time spent on date of service in review of medical records, review of labs, history taking, physical exam, discussion of assessment and plan, counseling and patient education is 30 minutes.    Avelina Peñaloza MD  Attending Physician  Pager [phone]                Signed Electronically by: Avelina Peñaloza MD      Again, thank you for allowing me to participate in the care of your patient.        Sincerely,        Avelina Peñaloza MD

## 2024-06-14 LAB
ATRIAL RATE - MUSE: 357 BPM
DIASTOLIC BLOOD PRESSURE - MUSE: NORMAL MMHG
INTERPRETATION ECG - MUSE: NORMAL
P AXIS - MUSE: NORMAL DEGREES
PR INTERVAL - MUSE: NORMAL MS
QRS DURATION - MUSE: 92 MS
QT - MUSE: 376 MS
QTC - MUSE: 406 MS
R AXIS - MUSE: 80 DEGREES
SYSTOLIC BLOOD PRESSURE - MUSE: NORMAL MMHG
T AXIS - MUSE: 47 DEGREES
VENTRICULAR RATE- MUSE: 70 BPM

## 2024-06-17 ENCOUNTER — LAB (OUTPATIENT)
Dept: LAB | Facility: CLINIC | Age: 64
End: 2024-06-17
Payer: COMMERCIAL

## 2024-06-17 DIAGNOSIS — C82.41 FOLLICULAR LYMPHOMA GRADE IIIB, LYMPH NODES OF HEAD, FACE, AND NECK (H): Primary | ICD-10-CM

## 2024-06-17 PROCEDURE — 88321 CONSLTJ&REPRT SLD PREP ELSWR: CPT | Performed by: PATHOLOGY

## 2024-06-17 PROCEDURE — 88321 CONSLTJ&REPRT SLD PREP ELSWR: CPT | Mod: XU | Performed by: PATHOLOGY

## 2024-06-19 DIAGNOSIS — Z51.11 ENCOUNTER FOR ANTINEOPLASTIC CHEMOTHERAPY: Primary | ICD-10-CM

## 2024-06-19 DIAGNOSIS — I48.20 CHRONIC ATRIAL FIBRILLATION (H): Primary | ICD-10-CM

## 2024-06-19 LAB
PATH REPORT.COMMENTS IMP SPEC: ABNORMAL
PATH REPORT.COMMENTS IMP SPEC: YES
PATH REPORT.FINAL DX SPEC: ABNORMAL
PATH REPORT.GROSS SPEC: ABNORMAL
PATH REPORT.MICROSCOPIC SPEC OTHER STN: ABNORMAL
PATH REPORT.RELEVANT HX SPEC: ABNORMAL
PATH REPORT.RELEVANT HX SPEC: ABNORMAL
PATH REPORT.SITE OF ORIGIN SPEC: ABNORMAL

## 2024-06-19 RX ORDER — ALBUTEROL SULFATE 0.83 MG/ML
2.5 SOLUTION RESPIRATORY (INHALATION)
Status: CANCELLED | OUTPATIENT
Start: 2024-06-20

## 2024-06-19 RX ORDER — DOXORUBICIN HYDROCHLORIDE 2 MG/ML
50 INJECTION, SOLUTION INTRAVENOUS ONCE
Status: CANCELLED | OUTPATIENT
Start: 2024-06-20

## 2024-06-19 RX ORDER — LORAZEPAM 2 MG/ML
0.5 INJECTION INTRAMUSCULAR EVERY 4 HOURS PRN
Status: CANCELLED | OUTPATIENT
Start: 2024-06-20

## 2024-06-19 RX ORDER — HEPARIN SODIUM,PORCINE 10 UNIT/ML
5-20 VIAL (ML) INTRAVENOUS DAILY PRN
Status: CANCELLED | OUTPATIENT
Start: 2024-06-20

## 2024-06-19 RX ORDER — MEPERIDINE HYDROCHLORIDE 25 MG/ML
25 INJECTION INTRAMUSCULAR; INTRAVENOUS; SUBCUTANEOUS EVERY 30 MIN PRN
Status: CANCELLED | OUTPATIENT
Start: 2024-06-20

## 2024-06-19 RX ORDER — DIPHENHYDRAMINE HCL 25 MG
50 CAPSULE ORAL ONCE
Status: CANCELLED
Start: 2024-06-20

## 2024-06-19 RX ORDER — DIPHENHYDRAMINE HYDROCHLORIDE 50 MG/ML
50 INJECTION INTRAMUSCULAR; INTRAVENOUS
Status: CANCELLED
Start: 2024-06-20

## 2024-06-19 RX ORDER — PALONOSETRON 0.05 MG/ML
0.25 INJECTION, SOLUTION INTRAVENOUS ONCE
Status: CANCELLED
Start: 2024-06-20

## 2024-06-19 RX ORDER — EPINEPHRINE 1 MG/ML
0.3 INJECTION, SOLUTION INTRAMUSCULAR; SUBCUTANEOUS EVERY 5 MIN PRN
Status: CANCELLED | OUTPATIENT
Start: 2024-06-20

## 2024-06-19 RX ORDER — HEPARIN SODIUM (PORCINE) LOCK FLUSH IV SOLN 100 UNIT/ML 100 UNIT/ML
5 SOLUTION INTRAVENOUS
Status: CANCELLED | OUTPATIENT
Start: 2024-06-20

## 2024-06-19 RX ORDER — MEPERIDINE HYDROCHLORIDE 25 MG/ML
25 INJECTION INTRAMUSCULAR; INTRAVENOUS; SUBCUTANEOUS
Status: CANCELLED
Start: 2024-06-20

## 2024-06-19 RX ORDER — ALBUTEROL SULFATE 90 UG/1
1-2 AEROSOL, METERED RESPIRATORY (INHALATION)
Status: CANCELLED
Start: 2024-06-20

## 2024-06-19 RX ORDER — METHYLPREDNISOLONE SODIUM SUCCINATE 125 MG/2ML
125 INJECTION, POWDER, LYOPHILIZED, FOR SOLUTION INTRAMUSCULAR; INTRAVENOUS
Status: CANCELLED
Start: 2024-06-20

## 2024-06-19 RX ORDER — CARVEDILOL 3.12 MG/1
3.12 TABLET ORAL 2 TIMES DAILY WITH MEALS
Qty: 60 TABLET | Refills: 0 | Status: SHIPPED | OUTPATIENT
Start: 2024-06-19 | End: 2024-07-26

## 2024-06-19 RX ORDER — ACETAMINOPHEN 325 MG/1
650 TABLET ORAL ONCE
Status: CANCELLED
Start: 2024-06-20

## 2024-06-20 ENCOUNTER — INFUSION THERAPY VISIT (OUTPATIENT)
Dept: ONCOLOGY | Facility: CLINIC | Age: 64
End: 2024-06-20
Attending: STUDENT IN AN ORGANIZED HEALTH CARE EDUCATION/TRAINING PROGRAM
Payer: COMMERCIAL

## 2024-06-20 ENCOUNTER — APPOINTMENT (OUTPATIENT)
Dept: LAB | Facility: CLINIC | Age: 64
End: 2024-06-20
Attending: STUDENT IN AN ORGANIZED HEALTH CARE EDUCATION/TRAINING PROGRAM
Payer: COMMERCIAL

## 2024-06-20 VITALS
SYSTOLIC BLOOD PRESSURE: 128 MMHG | WEIGHT: 168 LBS | DIASTOLIC BLOOD PRESSURE: 81 MMHG | RESPIRATION RATE: 16 BRPM | OXYGEN SATURATION: 100 % | BODY MASS INDEX: 27.36 KG/M2 | TEMPERATURE: 98.6 F | HEART RATE: 79 BPM

## 2024-06-20 DIAGNOSIS — C82.41 GRADE 3B FOLLICULAR LYMPHOMA OF LYMPH NODES OF NECK (H): Primary | ICD-10-CM

## 2024-06-20 DIAGNOSIS — E05.00 GRAVES DISEASE: ICD-10-CM

## 2024-06-20 DIAGNOSIS — Z51.11 ENCOUNTER FOR ANTINEOPLASTIC CHEMOTHERAPY: ICD-10-CM

## 2024-06-20 LAB
ALBUMIN SERPL BCG-MCNC: 4.3 G/DL (ref 3.5–5.2)
ALP SERPL-CCNC: 87 U/L (ref 40–150)
ALT SERPL W P-5'-P-CCNC: 12 U/L (ref 0–70)
ANION GAP SERPL CALCULATED.3IONS-SCNC: 8 MMOL/L (ref 7–15)
AST SERPL W P-5'-P-CCNC: 21 U/L (ref 0–45)
BASOPHILS # BLD AUTO: 0.1 10E3/UL (ref 0–0.2)
BASOPHILS NFR BLD AUTO: 1 %
BILIRUB SERPL-MCNC: 0.7 MG/DL
BUN SERPL-MCNC: 16.8 MG/DL (ref 8–23)
CALCIUM SERPL-MCNC: 9.4 MG/DL (ref 8.8–10.2)
CHLORIDE SERPL-SCNC: 103 MMOL/L (ref 98–107)
CREAT SERPL-MCNC: 0.8 MG/DL (ref 0.67–1.17)
DEPRECATED HCO3 PLAS-SCNC: 29 MMOL/L (ref 22–29)
EGFRCR SERPLBLD CKD-EPI 2021: >90 ML/MIN/1.73M2
EOSINOPHIL # BLD AUTO: 1 10E3/UL (ref 0–0.7)
EOSINOPHIL NFR BLD AUTO: 15 %
ERYTHROCYTE [DISTWIDTH] IN BLOOD BY AUTOMATED COUNT: 12.9 % (ref 10–15)
GLUCOSE SERPL-MCNC: 81 MG/DL (ref 70–99)
HBV CORE AB SERPL QL IA: NONREACTIVE
HBV SURFACE AB SERPL IA-ACNC: <3.5 M[IU]/ML
HBV SURFACE AB SERPL IA-ACNC: NONREACTIVE M[IU]/ML
HBV SURFACE AG SERPL QL IA: NONREACTIVE
HCT VFR BLD AUTO: 44.9 % (ref 40–53)
HGB BLD-MCNC: 15.5 G/DL (ref 13.3–17.7)
IMM GRANULOCYTES # BLD: 0 10E3/UL
IMM GRANULOCYTES NFR BLD: 0 %
LYMPHOCYTES # BLD AUTO: 1 10E3/UL (ref 0.8–5.3)
LYMPHOCYTES NFR BLD AUTO: 14 %
MCH RBC QN AUTO: 30.3 PG (ref 26.5–33)
MCHC RBC AUTO-ENTMCNC: 34.5 G/DL (ref 31.5–36.5)
MCV RBC AUTO: 88 FL (ref 78–100)
MONOCYTES # BLD AUTO: 0.7 10E3/UL (ref 0–1.3)
MONOCYTES NFR BLD AUTO: 10 %
NEUTROPHILS # BLD AUTO: 4.3 10E3/UL (ref 1.6–8.3)
NEUTROPHILS NFR BLD AUTO: 60 %
NRBC # BLD AUTO: 0 10E3/UL
NRBC BLD AUTO-RTO: 0 /100
PLATELET # BLD AUTO: 215 10E3/UL (ref 150–450)
POTASSIUM SERPL-SCNC: 4 MMOL/L (ref 3.4–5.3)
PROT SERPL-MCNC: 7.2 G/DL (ref 6.4–8.3)
RBC # BLD AUTO: 5.12 10E6/UL (ref 4.4–5.9)
SODIUM SERPL-SCNC: 140 MMOL/L (ref 135–145)
WBC # BLD AUTO: 7 10E3/UL (ref 4–11)

## 2024-06-20 PROCEDURE — 80053 COMPREHEN METABOLIC PANEL: CPT | Performed by: STUDENT IN AN ORGANIZED HEALTH CARE EDUCATION/TRAINING PROGRAM

## 2024-06-20 PROCEDURE — 86704 HEP B CORE ANTIBODY TOTAL: CPT | Performed by: STUDENT IN AN ORGANIZED HEALTH CARE EDUCATION/TRAINING PROGRAM

## 2024-06-20 PROCEDURE — 250N000011 HC RX IP 250 OP 636: Mod: JZ | Performed by: STUDENT IN AN ORGANIZED HEALTH CARE EDUCATION/TRAINING PROGRAM

## 2024-06-20 PROCEDURE — 84443 ASSAY THYROID STIM HORMONE: CPT

## 2024-06-20 PROCEDURE — 96377 APPLICATON ON-BODY INJECTOR: CPT | Mod: 59

## 2024-06-20 PROCEDURE — 36415 COLL VENOUS BLD VENIPUNCTURE: CPT | Performed by: STUDENT IN AN ORGANIZED HEALTH CARE EDUCATION/TRAINING PROGRAM

## 2024-06-20 PROCEDURE — 96367 TX/PROPH/DG ADDL SEQ IV INF: CPT

## 2024-06-20 PROCEDURE — 85025 COMPLETE CBC W/AUTO DIFF WBC: CPT | Performed by: STUDENT IN AN ORGANIZED HEALTH CARE EDUCATION/TRAINING PROGRAM

## 2024-06-20 PROCEDURE — 96415 CHEMO IV INFUSION ADDL HR: CPT

## 2024-06-20 PROCEDURE — 258N000003 HC RX IP 258 OP 636: Mod: JZ | Performed by: STUDENT IN AN ORGANIZED HEALTH CARE EDUCATION/TRAINING PROGRAM

## 2024-06-20 PROCEDURE — 96411 CHEMO IV PUSH ADDL DRUG: CPT

## 2024-06-20 PROCEDURE — 96417 CHEMO IV INFUS EACH ADDL SEQ: CPT

## 2024-06-20 PROCEDURE — 96375 TX/PRO/DX INJ NEW DRUG ADDON: CPT

## 2024-06-20 PROCEDURE — 86706 HEP B SURFACE ANTIBODY: CPT | Performed by: STUDENT IN AN ORGANIZED HEALTH CARE EDUCATION/TRAINING PROGRAM

## 2024-06-20 PROCEDURE — 87340 HEPATITIS B SURFACE AG IA: CPT | Performed by: STUDENT IN AN ORGANIZED HEALTH CARE EDUCATION/TRAINING PROGRAM

## 2024-06-20 PROCEDURE — 84439 ASSAY OF FREE THYROXINE: CPT

## 2024-06-20 PROCEDURE — 96413 CHEMO IV INFUSION 1 HR: CPT

## 2024-06-20 PROCEDURE — 96372 THER/PROPH/DIAG INJ SC/IM: CPT | Performed by: STUDENT IN AN ORGANIZED HEALTH CARE EDUCATION/TRAINING PROGRAM

## 2024-06-20 PROCEDURE — 250N000013 HC RX MED GY IP 250 OP 250 PS 637: Performed by: STUDENT IN AN ORGANIZED HEALTH CARE EDUCATION/TRAINING PROGRAM

## 2024-06-20 RX ORDER — DOXORUBICIN HYDROCHLORIDE 2 MG/ML
50 INJECTION, SOLUTION INTRAVENOUS ONCE
Status: COMPLETED | OUTPATIENT
Start: 2024-06-20 | End: 2024-06-20

## 2024-06-20 RX ORDER — ACETAMINOPHEN 325 MG/1
650 TABLET ORAL ONCE
Status: COMPLETED | OUTPATIENT
Start: 2024-06-20 | End: 2024-06-20

## 2024-06-20 RX ORDER — DIPHENHYDRAMINE HCL 25 MG
50 CAPSULE ORAL ONCE
Status: COMPLETED | OUTPATIENT
Start: 2024-06-20 | End: 2024-06-20

## 2024-06-20 RX ORDER — PALONOSETRON 0.05 MG/ML
0.25 INJECTION, SOLUTION INTRAVENOUS ONCE
Status: COMPLETED | OUTPATIENT
Start: 2024-06-20 | End: 2024-06-20

## 2024-06-20 RX ADMIN — CYCLOPHOSPHAMIDE 1500 MG: 1 INJECTION, POWDER, FOR SOLUTION INTRAVENOUS; ORAL at 08:26

## 2024-06-20 RX ADMIN — SODIUM CHLORIDE 250 ML: 9 INJECTION, SOLUTION INTRAVENOUS at 07:31

## 2024-06-20 RX ADMIN — DOXORUBICIN HYDROCHLORIDE 100 MG: 2 INJECTION, SOLUTION INTRAVENOUS at 08:08

## 2024-06-20 RX ADMIN — PALONOSETRON HYDROCHLORIDE 0.25 MG: 0.25 INJECTION INTRAVENOUS at 07:32

## 2024-06-20 RX ADMIN — RITUXIMAB-ABBS 700 MG: 10 INJECTION, SOLUTION INTRAVENOUS at 09:21

## 2024-06-20 RX ADMIN — PEGFILGRASTIM 6 MG: KIT SUBCUTANEOUS at 11:27

## 2024-06-20 RX ADMIN — FOSAPREPITANT 150 MG: 150 INJECTION, POWDER, LYOPHILIZED, FOR SOLUTION INTRAVENOUS at 07:34

## 2024-06-20 RX ADMIN — ACETAMINOPHEN 650 MG: 325 TABLET ORAL at 08:29

## 2024-06-20 RX ADMIN — VINCRISTINE SULFATE 2 MG: 1 INJECTION, SOLUTION INTRAVENOUS at 08:17

## 2024-06-20 RX ADMIN — DIPHENHYDRAMINE HYDROCHLORIDE 50 MG: 25 CAPSULE ORAL at 08:29

## 2024-06-20 ASSESSMENT — PAIN SCALES - GENERAL: PAINLEVEL: NO PAIN (0)

## 2024-06-20 NOTE — PROGRESS NOTES
Infusion Nursing Note:  Quincy Keane presents today for Cycle 1, day 1 rituxan, adriamycin, cytoxan, vincristine   Patient seen by provider today: No   present during visit today: Not Applicable.    Note: Patient new to infusion, oriented to infusion suite and call light.  Patient confirms that he has received written chemotherapy teaching materials. Basic side effects of medications reviewed today.  Patient confirms that he has a thermometer at home. Instructed to call with any fevers >100.4, shaking chills, other infectious symptoms, uncontrolled nausea, vomiting, diarrhea, constipation or any other new or concerning symptoms.  Provided with the phone number for triage.     Patient started prednisone, acyclovir, and allopurinol at home this morning. Carvedilol filled today and patient instructed to start taking as ordered      Intravenous Access:  Peripheral IV placed in lab    Treatment Conditions:  Lab Results   Component Value Date    HGB 15.5 06/20/2024    WBC 7.0 06/20/2024    ANEUTAUTO 4.3 06/20/2024     06/20/2024        Lab Results   Component Value Date     06/20/2024    POTASSIUM 4.0 06/20/2024    CR 0.80 06/20/2024    TERRA 9.4 06/20/2024    BILITOTAL 0.7 06/20/2024    ALBUMIN 4.3 06/20/2024    ALT 12 06/20/2024    AST 21 06/20/2024       Results reviewed, labs MET treatment parameters, ok to proceed with treatment.  ECHO/MUGA completed 6/10/24  EF 55%.      Post Infusion Assessment:  Patient tolerated infusion without incident.  Blood return noted pre and post infusion.  Blood return noted during adriamycin administration every 2-3 ml, and pre, mid, and post vincristine infusion by gravity  Site patent and intact, free from redness, edema or discomfort.  No evidence of extravasations.  Access discontinued per protocol.     Neulasta Onpro On-Body injector applied to right arm at 1130.  Writer discussed Neulasta injection would start tomorrow at 1430, approximately 27 hours after  application applied today.    Written and Verbal instruction reviewed with patient.  Pt instructed when the dose delivery starts, it will take about 45 minutes to complete.  Pt aware Neulasta Onpro On-Body should have green flashing light and to call triage or on-call MD if injector flashes red or appears to be leaking.   Pt aware to keep Onpro On-Body Neulasta 4 inches away from electrical equipment and to avoid showering 4 hours prior to injection.     Discharge Plan:   Prescription refills given for carvedilol.  Discharge instructions reviewed with: Patient and daughter.  Patient and/or family verbalized understanding of discharge instructions and all questions answered.  Copy of AVS reviewed with patient and/or family.  Patient will return in 3 weeks for next appointment. IAR released and RNCC messaged to follow up.  Patient discharged in stable condition accompanied by: self and daughter.  Departure Mode: Ambulatory.      Georgina Pelletier RN

## 2024-06-20 NOTE — LETTER
June 24, 2024      Quincyvenessa Piercetrev  [address]  [place] [address]        Dear ,    We are writing to inform you of your test results.    Thyroid function test is about the same (subclinical hyperthyroidism), if no changes in symptoms, plan recheck labs in 1-2 months.    Resulted Orders   TSH with free T4 reflex   Result Value Ref Range    TSH <0.01 (L) 0.30 - 4.20 uIU/mL   T4 free   Result Value Ref Range    Free T4 1.29 0.90 - 1.70 ng/dL       If you have any questions or concerns, please call the clinic at the number listed above.       Sincerely,      Cedric Lopez MD

## 2024-06-20 NOTE — PATIENT INSTRUCTIONS
Neulasta on-body injection will start tomorrow at 2:30 PM, approximately 27 hours after application today.  When the dose delivery starts, it will take about 45 minutes to complete.Neulasta Onpro On-Body should have green flashing light.  Call triage or on-call MD if injector flashes red or appears to be leaking.  Keep Onpro On-Body Neulasta 4 inches away from electrical equipment and avoid showering 4 hours prior to injection.       [facility] Triage and after hours / weekends / holidays:  [phone]    Please call the triage or after hours line if you experience a temperature greater than or equal to 100.4, shaking chills, have uncontrolled nausea, vomiting and/or diarrhea, dizziness, shortness of breath, chest pain, bleeding, unexplained bruising, or if you have any other new/concerning symptoms, questions or concerns.      If you are having any concerning symptoms or wish to speak to a provider before your next infusion visit, please call triage to notify them so we can adequately serve you.     If you need a refill on a narcotic prescription or other medication, please call before your infusion appointment.           June 2024 Sunday Monday Tuesday Wednesday Thursday Friday Saturday                                 1       2     3     4     5     6     7     8       9     10    PET ONCOLOGY WHOLE BODY  11:00 AM   (30 min.)   [id number]   [facility]    LAB CENTRAL   1:45 PM   (15 min.)   [facility] LAB DRAW   [facility]    ECHO COMPLETE   2:45 PM   (60 min.)   [id number]   [facility] 11     12     13    NEW ONCOLOGY   2:00 PM   (60 min.)   Avelina Peñaloza MD   [facility] 14     15       16     17    LAB MAIL IN  10:45 AM   (15 min.)   UU LAB MAIL IN   [facility] 18     19     20    LAB PERIPHERAL   6:30 AM   (15 min.)    [facility]     [facility]    ONC INFUSION 6 HR (360 MIN)   7:00 AM   (360 min.)    ONC INFUSION NURSE   [facility] 21    RETURN ENDOCRINE   3:15 PM   (30 min.)   Cedric Lopez MD   [facility] 22       23     24     25     26     27  Happy Birthday!     28     29       30                                               July 2024 Sunday Monday Tuesday Wednesday Thursday Friday Saturday        1     2     3     4     5     6       7     8     9     10     11     12     13       14     15     16     17     18     19     20       21     22     23     24     25     26     27       28     29     30     31                                   Recent Results (from the past 24 hour(s))   Comprehensive metabolic panel    Collection Time: 06/20/24  6:48 AM   Result Value Ref Range    Sodium 140 135 - 145 mmol/L    Potassium 4.0 3.4 - 5.3 mmol/L    Carbon Dioxide (CO2) 29 22 - 29 mmol/L    Anion Gap 8 7 - 15 mmol/L    Urea Nitrogen 16.8 8.0 - 23.0 mg/dL    Creatinine 0.80 0.67 - 1.17 mg/dL    GFR Estimate >90 >60 mL/min/1.73m2    Calcium 9.4 8.8 - 10.2 mg/dL    Chloride 103 98 - 107 mmol/L    Glucose 81 70 - 99 mg/dL    Alkaline Phosphatase 87 40 - 150 U/L    AST 21 0 - 45 U/L    ALT 12 0 - 70 U/L    Protein Total 7.2 6.4 - 8.3 g/dL    Albumin 4.3 3.5 - 5.2 g/dL    Bilirubin Total 0.7 <=1.2 mg/dL   CBC with platelets and differential    Collection Time: 06/20/24  6:48 AM   Result Value Ref Range    WBC Count 7.0 4.0 - 11.0 10e3/uL    RBC Count 5.12 4.40 - 5.90 10e6/uL    Hemoglobin 15.5 13.3 - 17.7 g/dL    Hematocrit 44.9 40.0 - 53.0 %    MCV 88 78 - 100 fL    MCH 30.3 26.5 - 33.0 pg    MCHC 34.5 31.5 - 36.5 g/dL    RDW 12.9 10.0 - 15.0 %    Platelet Count 215 150 - 450 10e3/uL    % Neutrophils 60 %    % Lymphocytes 14 %    % Monocytes 10 %    % Eosinophils 15 %    % Basophils 1 %    % Immature Granulocytes 0 %    NRBCs per 100 WBC 0 <1 /100     Absolute Neutrophils 4.3 1.6 - 8.3 10e3/uL    Absolute Lymphocytes 1.0 0.8 - 5.3 10e3/uL    Absolute Monocytes 0.7 0.0 - 1.3 10e3/uL    Absolute Eosinophils 1.0 (H) 0.0 - 0.7 10e3/uL    Absolute Basophils 0.1 0.0 - 0.2 10e3/uL    Absolute Immature Granulocytes 0.0 <=0.4 10e3/uL    Absolute NRBCs 0.0 10e3/uL

## 2024-06-20 NOTE — NURSING NOTE
Chief Complaint   Patient presents with    Blood Draw     Labs drawn via PIV placed by RN. VS taken.     Labs drawn from PIV placed by RN. Line flushed with saline. Vitals taken. Pt checked in for appointment(s).     Pily Esteban RN

## 2024-06-20 NOTE — PROGRESS NOTES
Audio-Visit Details    Type of service:  Video Visit  Video Start Time: 1532  Video End Time: 1543  Originating Location (pt. Location): Home, [place]  Distant Location (provider location):  Home  Platform used for Video Visit: Javier Lopez MD        HISTORY OF PRESENT ILLNESS  Quincy Keane is a 63 year old male  who is here for Follow-up thyroid concerns    Interval History  Pt saw Dr. Peñaloza and was started on RCHOP.    No palpitation  No shakiness  Sleep is find  No eye pain/ double vision    Pt did not want and so has never taken cholestyramine.    Initial visit  Per record reviewed, Kendell Myers MD - 03/12/2024 8:00 AM CDT  Formatting of this note might be different from the original.  [place] Office Visit  Subjective  Clayton is a 63 year old male who presents for Referral Endocrinology, Lump on left side of neck.    Clayton is a 63 y.o. M who presents to clinic today requesting referral for endocrinology.    He follows with dermatology in regards to a rash. He has had an erythematous patches with itch on his upper back and arms. These lesions were biopsied and consistent with contact dermatitis. He is not convinced with the diagnosis.    In his evaluation, he did also undergo laboratory testing. His TSH was significantly suppressed at that time. This is 2 years ago, and he never proceeded with follow-up.     Last couple years, had rash on the back.  Went to dermatologist--> recommend seeing allergist  Rash is intermittent, gone during vacation.  Pt is wondering if rashes could be from extra heat and sweating from thyroid    Weight stable  Pt reports having irregular heart beat, asymptomatic. Report that his primary think it could be from thyroid, plan follow up 1-2 week  No shakiness  Sleep is find  No eye pain/ double vision  No smoking    Pt has thyroid problem for about 45 years, saw thyroid doctor and was told that he needed to take thyroid medication the rest of your life.  Could not recall the name or how he took the medication.  Pt took thyroid medication and stopped 20 years ago  No changes in symptoms since stopped medication    Pt feels hot intermittently and rash  Mild headache, intermittently    Lump on the neck about 1 month, size stable, not growth  No mass on the front of neck  No problem swallowing/ breathing/ voice change    No h/o radiation Tx  No FH thyroid    Had CT with contrast 3/20/24 for neck mass, pt reported plan appointment 3/28/24 for the biopsy      No biotin/ hair skin nail supplement  Pt drinks herbal tea daily    REVIEW OF SYSTEMS  10 point negative except as mentioned in HPI    Past Medical/Surgical History:  H/o thyroid problem    Medications  Current Outpatient Medications   Medication Sig Dispense Refill    acyclovir (ZOVIRAX) 400 MG tablet Take 1 tablet (400 mg) by mouth every 12 hours for 30 days 60 tablet 11    allopurinol (ZYLOPRIM) 300 MG tablet Take 1 tablet (300 mg) by mouth daily for 14 days 14 tablet 0    betamethasone dipropionate (DIPROSONE) 0.05 % external ointment Apply sparingly to affected area on back twice daily for up to 2 weeks, no more then 15 days per month  Indications: rash      carvedilol (COREG) 3.125 MG tablet Take 1 tablet (3.125 mg) by mouth 2 times daily (with meals) for 30 days (Patient not taking: Reported on 6/20/2024) 60 tablet 0    ondansetron (ZOFRAN) 8 MG tablet Take 1 tablet (8 mg) by mouth every 8 hours as needed for nausea (vomiting) (Patient not taking: Reported on 6/20/2024) 30 tablet 2    prochlorperazine (COMPAZINE) 10 MG tablet Take 1 tablet (10 mg) by mouth every 6 hours as needed for nausea or vomiting (Patient not taking: Reported on 6/20/2024) 30 tablet 2     No current facility-administered medications for this visit.       Allergies  Allergies   Allergen Reactions    Penicillin V Unknown     Reaction Date: 08Sep2011; Annotation - [id number]: unknown reaction, childhood reaction    Cats      Other  Reaction(s): Rhinitis         Family History  family history includes Cerebrovascular Disease in his father.    Social History  Social History     Tobacco Use    Smoking status: Never    Smokeless tobacco: Never   Substance Use Topics    Alcohol use: Not Currently       Physical Exam  GENERAL :  In no apparent distress  Neck: visible left posterior neck mass  EYES: No obvious proptosis  RESP: Normal breathing  NEURO: awake, alert, responds appropriately to questions.      DATA REVIEW  Labs/Imaging  Thyroid Stimulating hormone  0.350 - 3.740 UIU/mL <0.007 Low    Resulting [facility]   Specimen Collected: 02/18/22  9:06 AM     Component  Ref Range & Units 2 wk ago   Thyroid Stimulating hormone  0.47 - 4.68 mIU/L <0.02 Low    Resulting [facility]   Specimen Collected: 03/12/24  9:00 AM     Component  Ref Range & Units 2 wk ago   T3, TOTAL  97 - 169 ng/dL 195 High    Resulting [facility]   Specimen Collected: 03/12/24  9:00 AM     Component  Ref Range & Units 2 wk ago   T4, Free  0.78 - 2.19 ng/dL 1.37   Resulting [facility]   Specimen Collected: 03/12/24  9:00 AM     PROCEDURE: US SOFT TISSUE MISC     HISTORY: Lump.     TECHNIQUE: Sonographic images were acquired of the left side of the neck   posteriorly at the site of the patient's lump.     COMPARISON: None.     IMPRESSION: At the site of the lump posteriorly in the left side of the neck,   just deep to the subcutaneous fat, there is a lobulated solid lesion measuring   2.8 x 2.4 x 1.5 cm. This is suspicious for a neoplastic process. This could be   an abnormal lymph node. Other etiologies are not excluded. Recommend further   evaluation with a contrast-enhanced CT of the neck.     Electronically signed by Anderson Lock MD   Report Date: 3/12/2024 3:30 PM     TECHNIQUE: Contrast-enhanced CT imaging of the neck utilizing 3 mm axial  slices.   Coronal and sagittal reformats provided.     Contrast: 100 mL of Omnipaque 350 was administered intravenously. No immediate   complication.     CT DLP DOSE: 269 (mGy.cm).     COMPARISON: Neck ultrasound 3/12/2024.     FINDINGS:     Airway: Patent and midline.     Tonsils: Punctate tonsillitis bilaterally.     Lymph nodes: Left posterolateral neck lobulated 2.4 x 1.3 cm soft tissue density   lesion located deep to the sternocleidomastoid (series 2, image 46). No   additional enlarged cervical lymph nodes identified.     Salivary glands: Normal.     Thyroid: Unremarkable.     Vasculature: Large vessels patent. Asymmetrically diminutive left vertebral   artery.     Orbits: Intact.     Paranasal sinuses: Scattered mucosal thickening predominating in the right   maxillary sinus.     Teeth: Largely intact.     Imaged intracranial structures: Unremarkable.     Osseous: Advanced multilevel cervical spondylosis. No suspicious lesion.     Soft tissues: Unremarkable.     IMPRESSION:   1. [place] lobulated 2.4 cm lesion in the left posterolateral neck remains   indeterminate. Lesion is amenable to ultrasound-guided core needle biopsy, which   should be considered for further evaluation.   2.  No additional enlarged cervical lymph nodes identified.           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  Most likely Graves' disease. Pt is feeling fine and undergoing chemotherapy, pt is not interested in treatment for Graves' disease for now  -- monitor, add on labs from  yesterday  -- recheck labs every 2-3 months, or sooner if changes in symptoms    Follow-up 6 months    The longitudinal plan of care for the diagnosis(es)/condition(s) as documented were addressed during this visit. Due to the added complexity in care, I will continue to support Quincy in the subsequent management and with ongoing continuity of care.    Cedric Lopez MD

## 2024-06-21 ENCOUNTER — VIRTUAL VISIT (OUTPATIENT)
Dept: ENDOCRINOLOGY | Facility: CLINIC | Age: 64
End: 2024-06-21
Payer: COMMERCIAL

## 2024-06-21 DIAGNOSIS — E05.00 GRAVES DISEASE: Primary | ICD-10-CM

## 2024-06-21 DIAGNOSIS — C82.41 GRADE 3B FOLLICULAR LYMPHOMA OF LYMPH NODES OF NECK (H): Primary | ICD-10-CM

## 2024-06-21 DIAGNOSIS — Z51.11 ENCOUNTER FOR ANTINEOPLASTIC CHEMOTHERAPY: ICD-10-CM

## 2024-06-21 LAB
T4 FREE SERPL-MCNC: 1.29 NG/DL (ref 0.9–1.7)
TSH SERPL DL<=0.005 MIU/L-ACNC: <0.01 UIU/ML (ref 0.3–4.2)

## 2024-06-21 PROCEDURE — 99213 OFFICE O/P EST LOW 20 MIN: CPT | Mod: 95 | Performed by: INTERNAL MEDICINE

## 2024-06-21 PROCEDURE — G2211 COMPLEX E/M VISIT ADD ON: HCPCS | Mod: 95 | Performed by: INTERNAL MEDICINE

## 2024-06-21 NOTE — NURSING NOTE
Is the patient currently in the state of [place]? YES    Visit mode:VIDEO    If the visit is dropped, the patient can be reconnected by: VIDEO VISIT: Text to cell phone:   Telephone Information:   Mobile [phone]       Will anyone else be joining the visit? NO  (If patient encounters technical issues they should call [phone])    How would you like to obtain your AVS? MyChart    Are changes needed to the allergy or medication list? Yes pt is not taking the betametsasone cream     PT is taking all the medications through his oncologist     Are refills needed on medications prescribed by this physician? NO- pt has not been taking the medicine     Reason for visit: CHALO Claros VVF    Pt declined to take the PHQ2.

## 2024-06-21 NOTE — LETTER
6/21/2024      Quincy Keane  [address]  [place] [address]      Dear Colleague,    Thank you for referring your patient, Quincy Keane, to the [facility] [place]. Please see a copy of my visit note below.    Audio-Visit Details    Type of service:  Video Visit  Video Start Time: 1532  Video End Time: 1543  Originating Location (pt. Location): Home, [place]  Distant Location (provider location):  Home  Platform used for Video Visit: Javier Lopez MD        HISTORY OF PRESENT ILLNESS  Quincy Keane is a 63 year old male  who is here for Follow-up thyroid concerns    Interval History  Pt saw Dr. Peñaloza and was started on RCHOP.    No palpitation  No shakiness  Sleep is find  No eye pain/ double vision    Pt did not want and so has never taken cholestyramine.    Initial visit  Per record reviewed, Kendell Myers MD - 03/12/2024 8:00 AM CDT  Formatting of this note might be different from the original.  [place] Office Visit  Subjective  Clayton is a 63 year old male who presents for Referral Endocrinology, Lump on left side of neck.    Clayton is a 63 y.o. M who presents to clinic today requesting referral for endocrinology.    He follows with dermatology in regards to a rash. He has had an erythematous patches with itch on his upper back and arms. These lesions were biopsied and consistent with contact dermatitis. He is not convinced with the diagnosis.    In his evaluation, he did also undergo laboratory testing. His TSH was significantly suppressed at that time. This is 2 years ago, and he never proceeded with follow-up.     Last couple years, had rash on the back.  Went to dermatologist--> recommend seeing allergist  Rash is intermittent, gone during vacation.  Pt is wondering if rashes could be from extra heat and sweating from thyroid    Weight stable  Pt reports having irregular heart beat, asymptomatic. Report that his primary think it could be from  thyroid, plan follow up 1-2 week  No shakiness  Sleep is find  No eye pain/ double vision  No smoking    Pt has thyroid problem for about 45 years, saw thyroid doctor and was told that he needed to take thyroid medication the rest of your life. Could not recall the name or how he took the medication.  Pt took thyroid medication and stopped 20 years ago  No changes in symptoms since stopped medication    Pt feels hot intermittently and rash  Mild headache, intermittently    Lump on the neck about 1 month, size stable, not growth  No mass on the front of neck  No problem swallowing/ breathing/ voice change    No h/o radiation Tx  No FH thyroid    Had CT with contrast 3/20/24 for neck mass, pt reported plan appointment 3/28/24 for the biopsy      No biotin/ hair skin nail supplement  Pt drinks herbal tea daily    REVIEW OF SYSTEMS  10 point negative except as mentioned in HPI    Past Medical/Surgical History:  H/o thyroid problem    Medications  Current Outpatient Medications   Medication Sig Dispense Refill     acyclovir (ZOVIRAX) 400 MG tablet Take 1 tablet (400 mg) by mouth every 12 hours for 30 days 60 tablet 11     allopurinol (ZYLOPRIM) 300 MG tablet Take 1 tablet (300 mg) by mouth daily for 14 days 14 tablet 0     betamethasone dipropionate (DIPROSONE) 0.05 % external ointment Apply sparingly to affected area on back twice daily for up to 2 weeks, no more then 15 days per month  Indications: rash       carvedilol (COREG) 3.125 MG tablet Take 1 tablet (3.125 mg) by mouth 2 times daily (with meals) for 30 days (Patient not taking: Reported on 6/20/2024) 60 tablet 0     ondansetron (ZOFRAN) 8 MG tablet Take 1 tablet (8 mg) by mouth every 8 hours as needed for nausea (vomiting) (Patient not taking: Reported on 6/20/2024) 30 tablet 2     prochlorperazine (COMPAZINE) 10 MG tablet Take 1 tablet (10 mg) by mouth every 6 hours as needed for nausea or vomiting (Patient not taking: Reported on 6/20/2024) 30 tablet 2     No  current facility-administered medications for this visit.       Allergies  Allergies   Allergen Reactions     Penicillin V Unknown     Reaction Date: 08Sep2011; Annotation - [id number]: unknown reaction, childhood reaction     Cats      Other Reaction(s): Rhinitis         Family History  family history includes Cerebrovascular Disease in his father.    Social History  Social History     Tobacco Use     Smoking status: Never     Smokeless tobacco: Never   Substance Use Topics     Alcohol use: Not Currently       Physical Exam  GENERAL :  In no apparent distress  Neck: visible left posterior neck mass  EYES: No obvious proptosis  RESP: Normal breathing  NEURO: awake, alert, responds appropriately to questions.      DATA REVIEW  Labs/Imaging  Thyroid Stimulating hormone  0.350 - 3.740 UIU/mL <0.007 Low    Resulting [facility]   Specimen Collected: 02/18/22  9:06 AM     Component  Ref Range & Units 2 wk ago   Thyroid Stimulating hormone  0.47 - 4.68 mIU/L <0.02 Low    Result[facility]   Specimen Collected: 03/12/24  9:00 AM     Component  Ref Range & Units 2 wk ago   T3, TOTAL  97 - 169 ng/dL 195 High    Result[facility]   Specimen Collected: 03/12/24  9:00 AM     Component  Ref Range & Units 2 wk ago   T4, Free  0.78 - 2.19 ng/dL 1.37   Result[facility]   Specimen Collected: 03/12/24  9:00 AM     PROCEDURE: US SOFT TISSUE MISC     HISTORY: Lump.     TECHNIQUE: Sonographic images were acquired of the left side of the neck   posteriorly at the site of the patient's lump.     COMPARISON: None.     IMPRESSION: At the site of the lump posteriorly in the left side of the neck,   just deep to the subcutaneous fat, there is a lobulated solid lesion measuring   2.8 x 2.4 x 1.5 cm. This is suspicious for a neoplastic process. This could be   an abnormal lymph node. Other etiologies  are not excluded. Recommend further   evaluation with a contrast-enhanced CT of the neck.     Electronically signed by Anderson Lock MD   Report Date: 3/12/2024 3:30 PM     TECHNIQUE: Contrast-enhanced CT imaging of the neck utilizing 3 mm axial slices.   Coronal and sagittal reformats provided.     Contrast: 100 mL of Omnipaque 350 was administered intravenously. No immediate   complication.     CT DLP DOSE: 269 (mGy.cm).     COMPARISON: Neck ultrasound 3/12/2024.     FINDINGS:     Airway: Patent and midline.     Tonsils: Punctate tonsillitis bilaterally.     Lymph nodes: Left posterolateral neck lobulated 2.4 x 1.3 cm soft tissue density   lesion located deep to the sternocleidomastoid (series 2, image 46). No   additional enlarged cervical lymph nodes identified.     Salivary glands: Normal.     Thyroid: Unremarkable.     Vasculature: Large vessels patent. Asymmetrically diminutive left vertebral   artery.     Orbits: Intact.     Paranasal sinuses: Scattered mucosal thickening predominating in the right   maxillary sinus.     Teeth: Largely intact.     Imaged intracranial structures: Unremarkable.     Osseous: Advanced multilevel cervical spondylosis. No suspicious lesion.     Soft tissues: Unremarkable.     IMPRESSION:   1. [place] lobulated 2.4 cm lesion in the left posterolateral neck remains   indeterminate. Lesion is amenable to ultrasound-guided core needle biopsy, which   should be considered for further evaluation.   2.  No additional enlarged cervical lymph nodes identified.           Lab Results   Component Value Date    TSH <0.01 05/14/2024      Free T4   Date Value Ref Range Status   05/14/2024 1.43 0.90 - 1.70 ng/dL Final       Latest Reference Range & Units 05/14/24 10:48   Free T3 2.0 - 4.4 pg/mL 3.9      Latest Reference Range & Units 03/29/24 09:59   ALT 0 - 70 U/L 12       ASSESSMENT/PLAN:   ##Subclinical hyperthyroidism, most likely from GD  ## Follicular large B-cell lymphoma on [facility]  For  thyrotoxicosis, thyroid uptake and scan showed diffuse I123 uptake 46% 5/2024.  Most likely Graves' disease. Pt is feeling fine and undergoing chemotherapy, pt is not interested in treatment for Graves' disease for now  -- monitor, add on labs from yesterday  -- recheck labs every 2-3 months, or sooner if changes in symptoms    Follow-up 6 months    The longitudinal plan of care for the diagnosis(es)/condition(s) as documented were addressed during this visit. Due to the added complexity in care, I will continue to support Quincy in the subsequent management and with ongoing continuity of care.    Cedric Lopez MD           Again, thank you for allowing me to participate in the care of your patient.        Sincerely,        Cedric Lopez MD

## 2024-06-26 ENCOUNTER — PATIENT OUTREACH (OUTPATIENT)
Dept: ONCOLOGY | Facility: CLINIC | Age: 64
End: 2024-06-26
Payer: COMMERCIAL

## 2024-06-26 DIAGNOSIS — C82.41 GRADE 3B FOLLICULAR LYMPHOMA OF LYMPH NODES OF NECK (H): Primary | ICD-10-CM

## 2024-06-26 NOTE — PROGRESS NOTES
[facility]: Cancer Care Short Note                                                                                          Outgoing Call: RNCC called patient to check on his status after his first infusion last week. Patient stated he is doing very well and has no symptoms right now. He had a lot of questions on the need to continue with treatment since he is feeling well and his lump has decreased in size. He would prefer to not continue if he didn't have to. RNCC reinforced and educated patient on the importance of compliance during treatment. Patient expressed interest in seeing a provider to check in and talk about treatment. RNCC scheduled patient for tomorrow, 6/26, with MARIBEL for tox check.    Patient to follow up as scheduled at next appt.    ISIS SheikhN, RN  RN Care Coordinator   [phone]

## 2024-07-10 NOTE — PROGRESS NOTES
Virtual Visit Details    Type of service:  Video Visit     Originating Location (pt. Location): Home    Distant Location (provider location):  On-site  Platform used for Video Visit: [facility]  Date of visit: 07/11/2024      Reason for Visit: Follow- up for Follicular Lymphoma      Oncology HPI: This is a 64 year old male with PMH of atrial fibrillation, thyroid dysfunction and probable dermatitis, and newly diagnosed follicular lymphoma.  He was otherwise doing well until he developed a lump on his neck on the left side about in March, 2024. At the time he denied fevers, chills, night sweat or weight loss.  Initial needle biopsy showed atypical lymphoid infiltrate. Subsequent core needle biopsy showed follicular lymphoma grade 3B. IGH/BCL2 rearrangement was positive. No BCL6 or MYC rearrangement. 6/10/24 PET scan showed hypermetabolic left upper cervical lymph nodes and subcarinal lymph node. Overall stage II, non bulky disease. Started R-CHOP (C1D1=6/20/24).     Interval history:   Quincy presents to clinic for toxicity check prior to C2D1 via virtual visit accompanied by his daughter.   He is overall doing well.  His energy levels are a little low and has been trying to take it easy.  He continues to be able to do the things he wants to do and has to stop himself from doing too much.  He has some mild SOB with exertion.  He is starting to have thinning of his hair.   His appetite is good and is eating and drinking well.  He converted to a strict vegan diet and had to switch to a more vegetarian diet.  He missed 3 days of his allopurinol  Denies fevers/chills, night sweats, N/V/D/C, neuropathy, rashes/sores,  cough, chest pain, all other acute issues or concerns.    Current Outpatient Medications   Medication Sig Dispense Refill    acyclovir (ZOVIRAX) 400 MG tablet Take 1 tablet (400 mg) by mouth every 12 hours for 30 days 60 tablet 11    betamethasone dipropionate  (DIPROSONE) 0.05 % external ointment Apply sparingly to affected area on back twice daily for up to 2 weeks, no more then 15 days per month  Indications: rash      carvedilol (COREG) 3.125 MG tablet Take 1 tablet (3.125 mg) by mouth 2 times daily (with meals) for 30 days (Patient not taking: Reported on 6/20/2024) 60 tablet 0    ondansetron (ZOFRAN) 8 MG tablet Take 1 tablet (8 mg) by mouth every 8 hours as needed for nausea (vomiting) (Patient not taking: Reported on 6/20/2024) 30 tablet 2    predniSONE (DELTASONE) 50 MG tablet Take 1 tablet (50 mg) by mouth 2 times daily for 5 days Take on Days 1 through 5. Take first dose in AM prior to chemotherapy. 10 tablet 7    prochlorperazine (COMPAZINE) 10 MG tablet Take 1 tablet (10 mg) by mouth every 6 hours as needed for nausea or vomiting (Patient not taking: Reported on 6/20/2024) 30 tablet 2       Allergies   Allergen Reactions    Penicillin V Unknown     Reaction Date: 08Sep2011; Annotation - [id number]: unknown reaction, childhood reaction    Cats      Other Reaction(s): Rhinitis         Physical Exam:  /85 (BP Location: Right arm, Patient Position: Sitting, Cuff Size: Adult Large)   Pulse 63   Temp 97.4  F (36.3  C) (Oral)   Resp 16   Wt 76.3 kg (168 lb 4.8 oz)   SpO2 100%   BMI 27.41 kg/m    Objective:  General: patient appears well in no acute distress, alert and oriented, word finding difficulty, slow speech  Skin: no visualized rash or lesions on visualized skin  Resp: Appears to be breathing comfortably without accessory muscle usage, speaking in full sentences, no audible wheezes or cough.  Psych: Coherent speech, normal rate and volume, able to articulate logical thoughts, able to abstract reason, no tangential thoughts, no hallucinations or delusions  Patient's affect is appropriate.    Labs:   Most Recent 3 CBC's:  Recent Labs   Lab Test 07/11/24  1000 06/20/24  0648 06/10/24  1133   WBC 5.8 7.0 3.8*   HGB 14.0 15.5 14.8   MCV 90 88 91   PLT  305 215 234   ANEUTAUTO 4.3 4.3 2.5     Most Recent 3 BMP's:  Recent Labs   Lab Test 07/11/24  1000 06/20/24  0648 06/10/24  1133    140 140   POTASSIUM 4.4 4.0 4.4   CHLORIDE 103 103 106   CO2 28 29 28   BUN 15.5 16.8 17.4   CR 0.74 0.80 0.76   ANIONGAP 7 8 6*   TERRA 8.9 9.4 8.9   GLC 90 81 92   PROTTOTAL 6.8 7.2 6.4   ALBUMIN 4.2 4.3 4.0    Most Recent 3 LFT's:  Recent Labs   Lab Test 07/11/24  1000 06/20/24  0648 06/10/24  1133   AST 22 21 19   ALT 13 12 7   ALKPHOS 78 87 67   BILITOTAL 0.4 0.7 0.7    Most Recent 2 TSH and T4:  Recent Labs   Lab Test 07/11/24  1004 06/20/24  0648 05/14/24  1048   TSH 0.34 <0.01* <0.01*   T4  --  1.29 1.43     I reviewed the above labs today.    Impression/plan:   High grade follicular lymphoma 3B:  - Previously reviewed natural history, prognosis, management of follicular lymphoma 3B with the patient. Treatment of FL 3B is in line with the DLBCL. Multi-agent chemotherapy with rituximab, cyclophosphamide, doxorubicin, vincristine and prednisone is the treatment of choice.  Previously discussed common toxicities include but are not limited to nausea, vomiting, diarrhea, rashes, and neuropathy.    - 6/20/24 C1D1 R-CHOP  - S/p C1 and tolerating treatment well and not experiencing significant side-effects/toxicities.  Patient reports he feels better after receiving 1 cycle of treatment and is wanting to stop treatment ASAP.  Patient questioning if we would be able to dose-reduce his chemotherapy today or only do 2 cycle of R-CHOP.  Discussed with patient that although he feels good and feels his tumor has shrunk he could still have active disease and we should continue to pursue full-dose chemotherapy as long as he is able to tolerate it as patient is not having any significant side-effects or toxicities that would warrant a dose-reduction.  Discussed I will follow-up with Dr. Peñaloza if she would like a PET scan prior to or after C3.  Educated patient that we would need a PET scan  to determine treatment effectiveness and that if we stop treatment too early his disease can relapse/worsen.  Patient verbalized understanding but would like to dose-reduce or stop chemotherapy as soon as Dr. Peñaloza thinks it is reasonable.  I plan to discuss further with Dr. Peñaloza patient's request.  - Proceed with C2D1 R-CHOP today (7/11)- refill of prednisone sent and reinforced how to take the prednisone    7/11 Addendum: Discussed plan with Dr. Peñaloza that due to patient having high-risk disease he will receive a total of 6 cycles of R-CHOP.  We can repeat PET scan prior to C3 but we will continue to give a total of 6 cycles.     Ppx:  - Acyclovir 400 mg BID    Atrial fibrillation:  - Atrial fibrillation is not an indication to not give anthracycline, but add carvedilol 12.5 BID for cardioprotection. Referred to cardiology and consulted 7/10 (note pending).      30 minutes spent on the date of the encounter doing chart review, review of test results, interpretation of tests, patient visit, and documentation     The longitudinal plan of care for the diagnosis(es)/condition(s) as documented were addressed during this visit. Due to the added complexity in care, I will continue to support Quincy in the subsequent management and with ongoing continuity of care.     MI Lucas [facility]  [address] [place] [address] [phone]

## 2024-07-11 ENCOUNTER — APPOINTMENT (OUTPATIENT)
Dept: LAB | Facility: CLINIC | Age: 64
End: 2024-07-11
Attending: STUDENT IN AN ORGANIZED HEALTH CARE EDUCATION/TRAINING PROGRAM
Payer: COMMERCIAL

## 2024-07-11 ENCOUNTER — INFUSION THERAPY VISIT (OUTPATIENT)
Dept: ONCOLOGY | Facility: CLINIC | Age: 64
End: 2024-07-11
Attending: STUDENT IN AN ORGANIZED HEALTH CARE EDUCATION/TRAINING PROGRAM
Payer: COMMERCIAL

## 2024-07-11 VITALS
WEIGHT: 168.3 LBS | BODY MASS INDEX: 27.41 KG/M2 | HEART RATE: 63 BPM | DIASTOLIC BLOOD PRESSURE: 85 MMHG | OXYGEN SATURATION: 100 % | SYSTOLIC BLOOD PRESSURE: 137 MMHG | RESPIRATION RATE: 16 BRPM | TEMPERATURE: 97.4 F

## 2024-07-11 DIAGNOSIS — Z51.11 ENCOUNTER FOR ANTINEOPLASTIC CHEMOTHERAPY: ICD-10-CM

## 2024-07-11 DIAGNOSIS — I48.20 CHRONIC ATRIAL FIBRILLATION (H): ICD-10-CM

## 2024-07-11 DIAGNOSIS — E05.00 GRAVES DISEASE: ICD-10-CM

## 2024-07-11 DIAGNOSIS — C82.41 GRADE 3B FOLLICULAR LYMPHOMA OF LYMPH NODES OF NECK (H): Primary | ICD-10-CM

## 2024-07-11 LAB
ALBUMIN SERPL BCG-MCNC: 4.2 G/DL (ref 3.5–5.2)
ALP SERPL-CCNC: 78 U/L (ref 40–150)
ALT SERPL W P-5'-P-CCNC: 13 U/L (ref 0–70)
ANION GAP SERPL CALCULATED.3IONS-SCNC: 7 MMOL/L (ref 7–15)
AST SERPL W P-5'-P-CCNC: 22 U/L (ref 0–45)
BASOPHILS # BLD AUTO: 0.1 10E3/UL (ref 0–0.2)
BASOPHILS NFR BLD AUTO: 2 %
BILIRUB SERPL-MCNC: 0.4 MG/DL
BUN SERPL-MCNC: 15.5 MG/DL (ref 8–23)
CALCIUM SERPL-MCNC: 8.9 MG/DL (ref 8.8–10.2)
CHLORIDE SERPL-SCNC: 103 MMOL/L (ref 98–107)
CREAT SERPL-MCNC: 0.74 MG/DL (ref 0.67–1.17)
DEPRECATED HCO3 PLAS-SCNC: 28 MMOL/L (ref 22–29)
EGFRCR SERPLBLD CKD-EPI 2021: >90 ML/MIN/1.73M2
EOSINOPHIL # BLD AUTO: 0 10E3/UL (ref 0–0.7)
EOSINOPHIL NFR BLD AUTO: 0 %
ERYTHROCYTE [DISTWIDTH] IN BLOOD BY AUTOMATED COUNT: 13.6 % (ref 10–15)
GLUCOSE SERPL-MCNC: 90 MG/DL (ref 70–99)
HCT VFR BLD AUTO: 40.3 % (ref 40–53)
HGB BLD-MCNC: 14 G/DL (ref 13.3–17.7)
IMM GRANULOCYTES # BLD: 0 10E3/UL
IMM GRANULOCYTES NFR BLD: 1 %
LDH SERPL L TO P-CCNC: 208 U/L (ref 0–250)
LYMPHOCYTES # BLD AUTO: 0.8 10E3/UL (ref 0.8–5.3)
LYMPHOCYTES NFR BLD AUTO: 13 %
MCH RBC QN AUTO: 31.2 PG (ref 26.5–33)
MCHC RBC AUTO-ENTMCNC: 34.7 G/DL (ref 31.5–36.5)
MCV RBC AUTO: 90 FL (ref 78–100)
MONOCYTES # BLD AUTO: 0.6 10E3/UL (ref 0–1.3)
MONOCYTES NFR BLD AUTO: 11 %
NEUTROPHILS # BLD AUTO: 4.3 10E3/UL (ref 1.6–8.3)
NEUTROPHILS NFR BLD AUTO: 73 %
NRBC # BLD AUTO: 0 10E3/UL
NRBC BLD AUTO-RTO: 0 /100
PLATELET # BLD AUTO: 305 10E3/UL (ref 150–450)
POTASSIUM SERPL-SCNC: 4.4 MMOL/L (ref 3.4–5.3)
PROT SERPL-MCNC: 6.8 G/DL (ref 6.4–8.3)
RBC # BLD AUTO: 4.49 10E6/UL (ref 4.4–5.9)
SODIUM SERPL-SCNC: 138 MMOL/L (ref 135–145)
TSH SERPL DL<=0.005 MIU/L-ACNC: 0.34 UIU/ML (ref 0.3–4.2)
URATE SERPL-MCNC: 4.7 MG/DL (ref 3.4–7)
WBC # BLD AUTO: 5.8 10E3/UL (ref 4–11)

## 2024-07-11 PROCEDURE — 99215 OFFICE O/P EST HI 40 MIN: CPT | Mod: 95

## 2024-07-11 PROCEDURE — G2211 COMPLEX E/M VISIT ADD ON: HCPCS | Mod: 95

## 2024-07-11 PROCEDURE — 96415 CHEMO IV INFUSION ADDL HR: CPT

## 2024-07-11 PROCEDURE — 258N000003 HC RX IP 258 OP 636

## 2024-07-11 PROCEDURE — 85041 AUTOMATED RBC COUNT: CPT

## 2024-07-11 PROCEDURE — 96413 CHEMO IV INFUSION 1 HR: CPT

## 2024-07-11 PROCEDURE — 250N000013 HC RX MED GY IP 250 OP 250 PS 637

## 2024-07-11 PROCEDURE — 96372 THER/PROPH/DIAG INJ SC/IM: CPT

## 2024-07-11 PROCEDURE — 96377 APPLICATON ON-BODY INJECTOR: CPT | Mod: 59

## 2024-07-11 PROCEDURE — 96417 CHEMO IV INFUS EACH ADDL SEQ: CPT

## 2024-07-11 PROCEDURE — 250N000011 HC RX IP 250 OP 636

## 2024-07-11 PROCEDURE — 96367 TX/PROPH/DG ADDL SEQ IV INF: CPT

## 2024-07-11 PROCEDURE — 96375 TX/PRO/DX INJ NEW DRUG ADDON: CPT

## 2024-07-11 PROCEDURE — 36415 COLL VENOUS BLD VENIPUNCTURE: CPT

## 2024-07-11 PROCEDURE — 82040 ASSAY OF SERUM ALBUMIN: CPT

## 2024-07-11 PROCEDURE — 84550 ASSAY OF BLOOD/URIC ACID: CPT

## 2024-07-11 PROCEDURE — 84443 ASSAY THYROID STIM HORMONE: CPT | Mod: GT,95

## 2024-07-11 PROCEDURE — 96411 CHEMO IV PUSH ADDL DRUG: CPT

## 2024-07-11 PROCEDURE — 83615 LACTATE (LD) (LDH) ENZYME: CPT

## 2024-07-11 RX ORDER — EPINEPHRINE 1 MG/ML
0.3 INJECTION, SOLUTION INTRAMUSCULAR; SUBCUTANEOUS EVERY 5 MIN PRN
Status: CANCELLED | OUTPATIENT
Start: 2024-07-11

## 2024-07-11 RX ORDER — LORAZEPAM 2 MG/ML
0.5 INJECTION INTRAMUSCULAR EVERY 4 HOURS PRN
Status: CANCELLED | OUTPATIENT
Start: 2024-07-11

## 2024-07-11 RX ORDER — ALBUTEROL SULFATE 90 UG/1
1-2 AEROSOL, METERED RESPIRATORY (INHALATION)
Status: CANCELLED
Start: 2024-07-11

## 2024-07-11 RX ORDER — DOXORUBICIN HYDROCHLORIDE 2 MG/ML
50 INJECTION, SOLUTION INTRAVENOUS ONCE
Status: CANCELLED | OUTPATIENT
Start: 2024-07-11

## 2024-07-11 RX ORDER — DIPHENHYDRAMINE HYDROCHLORIDE 50 MG/ML
50 INJECTION INTRAMUSCULAR; INTRAVENOUS
Status: CANCELLED
Start: 2024-07-11

## 2024-07-11 RX ORDER — PALONOSETRON 0.05 MG/ML
0.25 INJECTION, SOLUTION INTRAVENOUS ONCE
Status: CANCELLED
Start: 2024-07-11

## 2024-07-11 RX ORDER — PREDNISONE 50 MG/1
50 TABLET ORAL 2 TIMES DAILY
Qty: 10 TABLET | Refills: 7 | Status: SHIPPED | OUTPATIENT
Start: 2024-07-11 | End: 2024-07-16

## 2024-07-11 RX ORDER — MEPERIDINE HYDROCHLORIDE 25 MG/ML
25 INJECTION INTRAMUSCULAR; INTRAVENOUS; SUBCUTANEOUS EVERY 30 MIN PRN
Status: CANCELLED | OUTPATIENT
Start: 2024-07-11

## 2024-07-11 RX ORDER — DIPHENHYDRAMINE HCL 25 MG
50 CAPSULE ORAL ONCE
Status: COMPLETED | OUTPATIENT
Start: 2024-07-11 | End: 2024-07-11

## 2024-07-11 RX ORDER — PALONOSETRON 0.05 MG/ML
0.25 INJECTION, SOLUTION INTRAVENOUS ONCE
Status: COMPLETED | OUTPATIENT
Start: 2024-07-11 | End: 2024-07-11

## 2024-07-11 RX ORDER — HEPARIN SODIUM,PORCINE 10 UNIT/ML
5-20 VIAL (ML) INTRAVENOUS DAILY PRN
Status: CANCELLED | OUTPATIENT
Start: 2024-07-11

## 2024-07-11 RX ORDER — DOXORUBICIN HYDROCHLORIDE 2 MG/ML
50 INJECTION, SOLUTION INTRAVENOUS ONCE
Status: COMPLETED | OUTPATIENT
Start: 2024-07-11 | End: 2024-07-11

## 2024-07-11 RX ORDER — ALBUTEROL SULFATE 0.83 MG/ML
2.5 SOLUTION RESPIRATORY (INHALATION)
Status: CANCELLED | OUTPATIENT
Start: 2024-07-11

## 2024-07-11 RX ORDER — METHYLPREDNISOLONE SODIUM SUCCINATE 125 MG/2ML
125 INJECTION, POWDER, LYOPHILIZED, FOR SOLUTION INTRAMUSCULAR; INTRAVENOUS
Status: CANCELLED
Start: 2024-07-11

## 2024-07-11 RX ORDER — DIPHENHYDRAMINE HCL 25 MG
50 CAPSULE ORAL ONCE
Status: CANCELLED
Start: 2024-07-11

## 2024-07-11 RX ORDER — MEPERIDINE HYDROCHLORIDE 25 MG/ML
25 INJECTION INTRAMUSCULAR; INTRAVENOUS; SUBCUTANEOUS
Status: CANCELLED
Start: 2024-07-11

## 2024-07-11 RX ORDER — ACETAMINOPHEN 325 MG/1
650 TABLET ORAL ONCE
Status: COMPLETED | OUTPATIENT
Start: 2024-07-11 | End: 2024-07-11

## 2024-07-11 RX ORDER — HEPARIN SODIUM (PORCINE) LOCK FLUSH IV SOLN 100 UNIT/ML 100 UNIT/ML
5 SOLUTION INTRAVENOUS
Status: CANCELLED | OUTPATIENT
Start: 2024-07-11

## 2024-07-11 RX ORDER — ACETAMINOPHEN 325 MG/1
650 TABLET ORAL ONCE
Status: CANCELLED
Start: 2024-07-11

## 2024-07-11 RX ADMIN — PALONOSETRON HYDROCHLORIDE 0.25 MG: 0.25 INJECTION INTRAVENOUS at 11:42

## 2024-07-11 RX ADMIN — ACETAMINOPHEN 650 MG: 325 TABLET ORAL at 11:39

## 2024-07-11 RX ADMIN — CYCLOPHOSPHAMIDE 1500 MG: 1 INJECTION, POWDER, FOR SOLUTION INTRAVENOUS; ORAL at 12:32

## 2024-07-11 RX ADMIN — PEGFILGRASTIM 6 MG: KIT SUBCUTANEOUS at 14:30

## 2024-07-11 RX ADMIN — DIPHENHYDRAMINE HYDROCHLORIDE 50 MG: 25 CAPSULE ORAL at 11:39

## 2024-07-11 RX ADMIN — FOSAPREPITANT 150 MG: 150 INJECTION, POWDER, LYOPHILIZED, FOR SOLUTION INTRAVENOUS at 11:44

## 2024-07-11 RX ADMIN — DOXORUBICIN HYDROCHLORIDE 100 MG: 2 INJECTION, SOLUTION INTRAVENOUS at 12:11

## 2024-07-11 RX ADMIN — SODIUM CHLORIDE 250 ML: 9 INJECTION, SOLUTION INTRAVENOUS at 11:36

## 2024-07-11 RX ADMIN — RITUXIMAB-ABBS 700 MG: 10 INJECTION, SOLUTION INTRAVENOUS at 13:31

## 2024-07-11 RX ADMIN — VINCRISTINE SULFATE 2 MG: 1 INJECTION, SOLUTION INTRAVENOUS at 12:23

## 2024-07-11 ASSESSMENT — PAIN SCALES - GENERAL: PAINLEVEL: NO PAIN (0)

## 2024-07-11 NOTE — PROGRESS NOTES
Infusion Nursing Note:  Quincy Keane presents today for cycle 2 day 1 doxorubicin, vincrisitine, cyclophosphaide and rituximab-abbs.    Patient seen by provider today: Yes: Dorie Zavala CNP   present during visit today: Not Applicable.    Note: Pt presents today after provider visit with Dorie Zavala CNP. Pt wishes to proceed with planned treatment. Per note from cycle 1, pt tolerated rituximab-abbs well. Will proceed today with rapid rituximab-abbs.    Intravenous Access:  Peripheral IV placed.    Treatment Conditions:  Lab Results   Component Value Date    HGB 14.0 07/11/2024    WBC 5.8 07/11/2024    ANEUTAUTO 4.3 07/11/2024     07/11/2024        Lab Results   Component Value Date     07/11/2024    POTASSIUM 4.4 07/11/2024    CR 0.74 07/11/2024    TERRA 8.9 07/11/2024    BILITOTAL 0.4 07/11/2024    ALBUMIN 4.2 07/11/2024    ALT 13 07/11/2024    AST 22 07/11/2024     Results reviewed, labs MET treatment parameters, ok to proceed with treatment.  Echo completed 06/10/24 - EF 55%.    Post Infusion Assessment:  Patient tolerated infusion without incident.  Blood return noted pre and post infusion.  Site patent and intact, free from redness, edema or discomfort.  No evidence of extravasations.  Access discontinued per protocol.     Discharge Plan:   Prescription refills given for prednisone.  Discharge instructions reviewed with: Patient.  Patient and/or family verbalized understanding of discharge instructions and all questions answered.  AVS to patient via Rev[employer].  Patient will return 08/01/24 for next appointment.   Patient discharged in stable condition accompanied by: self.  Departure Mode: Ambulatory.      Emily Meese, RN

## 2024-07-11 NOTE — NURSING NOTE
Current patient location:  [facility]     Is the patient currently in the state of [place]? YES    Visit mode:VIDEO    If the visit is dropped, the patient can be reconnected by: VIDEO VISIT: Text to cell phone:   Telephone Information:   Mobile [phone]       Will anyone else be joining the visit? NO  (If patient encounters technical issues they should call [phone]    How would you like to obtain your AVS? MyChart    Are changes needed to the allergy or medication list? Pt stated no changes to allergies and Pt stated no med changes    Are refills needed on medications prescribed by this physician? NO    Reason for visit: CHALO HARGROVE

## 2024-07-11 NOTE — NURSING NOTE
Chief Complaint   Patient presents with    RECHECK    Blood Draw     Labs drawn with piv start by rn.  VS taken.     Labs drawn with PIV start by rn.  Pt tolerated well.  VS taken and pt checked in for next appt.    Elda Smith RN

## 2024-07-11 NOTE — LETTER
7/11/2024      Quincy Keane  [address]  [facility] [address]      Dear Colleague,    Thank you for referring your patient, Quincy Keane, to the [facility]. Please see a copy of my visit note below.    Virtual Visit Details    Type of service:  Video Visit     Originating Location (pt. Location): Home    Distant Location (provider location):  On-site  Platform used for Video Visit: [facility] [place]  Date of visit: 07/11/2024      Reason for Visit: Follow- up for Follicular Lymphoma      Oncology HPI: This is a 64 year old male with PMH of atrial fibrillation, thyroid dysfunction and probable dermatitis, and newly diagnosed follicular lymphoma.  He was otherwise doing well until he developed a lump on his neck on the left side about in March, 2024. At the time he denied fevers, chills, night sweat or weight loss.  Initial needle biopsy showed atypical lymphoid infiltrate. Subsequent core needle biopsy showed follicular lymphoma grade 3B. IGH/BCL2 rearrangement was positive. No BCL6 or MYC rearrangement. 6/10/24 PET scan showed hypermetabolic left upper cervical lymph nodes and subcarinal lymph node. Overall stage II, non bulky disease. Started R-CHOP (C1D1=6/20/24).     Interval history:   Quincy presents to clinic for toxicity check prior to C2D1 via virtual visit accompanied by his daughter.   He is overall doing well.  His energy levels are a little low and has been trying to take it easy.  He continues to be able to do the things he wants to do and has to stop himself from doing too much.  He has some mild SOB with exertion.  He is starting to have thinning of his hair.   His appetite is good and is eating and drinking well.  He converted to a strict vegan diet and had to switch to a more vegetarian diet.  He missed 3 days of his allopurinol  Denies fevers/chills, night sweats, N/V/D/C, neuropathy, rashes/sores,  cough, chest pain,  all other acute issues or concerns.    Current Outpatient Medications   Medication Sig Dispense Refill     acyclovir (ZOVIRAX) 400 MG tablet Take 1 tablet (400 mg) by mouth every 12 hours for 30 days 60 tablet 11     betamethasone dipropionate (DIPROSONE) 0.05 % external ointment Apply sparingly to affected area on back twice daily for up to 2 weeks, no more then 15 days per month  Indications: rash       carvedilol (COREG) 3.125 MG tablet Take 1 tablet (3.125 mg) by mouth 2 times daily (with meals) for 30 days (Patient not taking: Reported on 6/20/2024) 60 tablet 0     ondansetron (ZOFRAN) 8 MG tablet Take 1 tablet (8 mg) by mouth every 8 hours as needed for nausea (vomiting) (Patient not taking: Reported on 6/20/2024) 30 tablet 2     predniSONE (DELTASONE) 50 MG tablet Take 1 tablet (50 mg) by mouth 2 times daily for 5 days Take on Days 1 through 5. Take first dose in AM prior to chemotherapy. 10 tablet 7     prochlorperazine (COMPAZINE) 10 MG tablet Take 1 tablet (10 mg) by mouth every 6 hours as needed for nausea or vomiting (Patient not taking: Reported on 6/20/2024) 30 tablet 2       Allergies   Allergen Reactions     Penicillin V Unknown     Reaction Date: 08Sep2011; Annotation - [id number]: unknown reaction, childhood reaction     Cats      Other Reaction(s): Rhinitis         Physical Exam:  /85 (BP Location: Right arm, Patient Position: Sitting, Cuff Size: Adult Large)   Pulse 63   Temp 97.4  F (36.3  C) (Oral)   Resp 16   Wt 76.3 kg (168 lb 4.8 oz)   SpO2 100%   BMI 27.41 kg/m    Objective:  General: patient appears well in no acute distress, alert and oriented, word finding difficulty, slow speech  Skin: no visualized rash or lesions on visualized skin  Resp: Appears to be breathing comfortably without accessory muscle usage, speaking in full sentences, no audible wheezes or cough.  Psych: Coherent speech, normal rate and volume, able to articulate logical thoughts, able to abstract reason, no  tangential thoughts, no hallucinations or delusions  Patient's affect is appropriate.    Labs:   Most Recent 3 CBC's:  Recent Labs   Lab Test 07/11/24  1000 06/20/24  0648 06/10/24  1133   WBC 5.8 7.0 3.8*   HGB 14.0 15.5 14.8   MCV 90 88 91    215 234   ANEUTAUTO 4.3 4.3 2.5     Most Recent 3 BMP's:  Recent Labs   Lab Test 07/11/24  1000 06/20/24  0648 06/10/24  1133    140 140   POTASSIUM 4.4 4.0 4.4   CHLORIDE 103 103 106   CO2 28 29 28   BUN 15.5 16.8 17.4   CR 0.74 0.80 0.76   ANIONGAP 7 8 6*   TERRA 8.9 9.4 8.9   GLC 90 81 92   PROTTOTAL 6.8 7.2 6.4   ALBUMIN 4.2 4.3 4.0    Most Recent 3 LFT's:  Recent Labs   Lab Test 07/11/24  1000 06/20/24  0648 06/10/24  1133   AST 22 21 19   ALT 13 12 7   ALKPHOS 78 87 67   BILITOTAL 0.4 0.7 0.7    Most Recent 2 TSH and T4:  Recent Labs   Lab Test 07/11/24  1004 06/20/24  0648 05/14/24  1048   TSH 0.34 <0.01* <0.01*   T4  --  1.29 1.43     I reviewed the above labs today.    Impression/plan:   High grade follicular lymphoma 3B:  - Previously reviewed natural history, prognosis, management of follicular lymphoma 3B with the patient. Treatment of FL 3B is in line with the DLBCL. Multi-agent chemotherapy with rituximab, cyclophosphamide, doxorubicin, vincristine and prednisone is the treatment of choice.  Previously discussed common toxicities include but are not limited to nausea, vomiting, diarrhea, rashes, and neuropathy.    - 6/20/24 C1D1 R-CHOP  - S/p C1 and tolerating treatment well and not experiencing significant side-effects/toxicities.  Patient reports he feels better after receiving 1 cycle of treatment and is wanting to stop treatment ASAP.  Patient questioning if we would be able to dose-reduce his chemotherapy today or only do 2 cycle of R-CHOP.  Discussed with patient that although he feels good and feels his tumor has shrunk he could still have active disease and we should continue to pursue full-dose chemotherapy as long as he is able to tolerate  it as patient is not having any significant side-effects or toxicities that would warrant a dose-reduction.  Discussed I will follow-up with Dr. Peñaloza if she would like a PET scan prior to or after C3.  Educated patient that we would need a PET scan to determine treatment effectiveness and that if we stop treatment too early his disease can relapse/worsen.  Patient verbalized understanding but would like to dose-reduce or stop chemotherapy as soon as Dr. Peñaloza thinks it is reasonable.  I plan to discuss further with Dr. Peñaloza patient's request.  - Proceed with C2D1 R-CHOP today (7/11)- refill of prednisone sent and reinforced how to take the prednisone    Ppx:  - Acyclovir 400 mg BID    Atrial fibrillation:  - Atrial fibrillation is not an indication to not give anthracycline, but add carvedilol 12.5 BID for cardioprotection. Referred to cardiology and consulted 7/10 (note pending).      30 minutes spent on the date of the encounter doing chart review, review of test results, interpretation of tests, patient visit, and documentation     The longitudinal plan of care for the diagnosis(es)/condition(s) as documented were addressed during this visit. Due to the added complexity in care, I will continue to support Quincy in the subsequent management and with ongoing continuity of care.     MI Lucas CNP  [facility] [address] [address] [phone]        Again, thank you for allowing me to participate in the care of your patient.        Sincerely,        MI Lucas CNP

## 2024-07-22 DIAGNOSIS — I48.20 CHRONIC ATRIAL FIBRILLATION (H): ICD-10-CM

## 2024-07-24 ENCOUNTER — OFFICE VISIT (OUTPATIENT)
Dept: CARDIOLOGY | Facility: CLINIC | Age: 64
End: 2024-07-24
Attending: STUDENT IN AN ORGANIZED HEALTH CARE EDUCATION/TRAINING PROGRAM
Payer: COMMERCIAL

## 2024-07-24 VITALS
WEIGHT: 164.8 LBS | OXYGEN SATURATION: 100 % | DIASTOLIC BLOOD PRESSURE: 69 MMHG | SYSTOLIC BLOOD PRESSURE: 135 MMHG | BODY MASS INDEX: 24.41 KG/M2 | HEIGHT: 69 IN | HEART RATE: 72 BPM

## 2024-07-24 DIAGNOSIS — I48.91 NEW ONSET ATRIAL FIBRILLATION (H): Primary | ICD-10-CM

## 2024-07-24 DIAGNOSIS — C82.91 FOLLICULAR LYMPHOMA OF LYMPH NODES OF NECK, UNSPECIFIED FOLLICULAR LYMPHOMA TYPE (H): ICD-10-CM

## 2024-07-24 PROCEDURE — G2211 COMPLEX E/M VISIT ADD ON: HCPCS | Performed by: INTERNAL MEDICINE

## 2024-07-24 PROCEDURE — 99205 OFFICE O/P NEW HI 60 MIN: CPT | Performed by: INTERNAL MEDICINE

## 2024-07-24 RX ORDER — CARVEDILOL 3.12 MG/1
3.12 TABLET ORAL 2 TIMES DAILY WITH MEALS
Qty: 200 TABLET | Refills: 6 | Status: CANCELLED | OUTPATIENT
Start: 2024-07-24

## 2024-07-24 NOTE — PROGRESS NOTES
SERVICE DATE: July 24, 2024      PRIMARY CARE AND REFERRING PROVIDER:  Avelina Peñaloza  [address]  [facility] [address]    REASON FOR VISIT:  New onset atrial fibrillation.    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  At his recent oncology visit, noted to have irregular heart confirmed to be atrial fibrillation on EKG, was appropriately started on low-dose carvedilol 3.125 mg twice daily and referred to cardiology.     Patient has no active medical symptoms from his atrial fibrillation.  He is a vague historian.  Does not have a primary care provider.  Says he prefers natural therapies.    I independently interpreted EKG dated 13 June 2024 which showed rate controlled fibrillation with ventricular rates in the 70s, no ischemic changes, no prior infarct, QTc is normal at 460 ms subsequently underwent an echocardiogram which showed normal left ventricular size, mildly decreased ventricular systolic function with LVEF of 55%, normal right ventricular systolic function, severe left atrial enlargement, no valve disease, normal inferior vena cava.  Pertinent labs show an elevated creatinine at 124, potassium 4.4, hemoglobin 15.0, normal platelets, normal TSH, normal renal panel.    Cardiac auscultation shows rate controlled atrial fibrillation in the 70s, no murmur, no carotid bruit, normal JVP, lungs are clear.  /69.    DIAGNOSES/ASSESSMENT:  Asymptomatic new onset atrial fibrillation, rate controlled on low-dose carvedilol.  Recent diagnosis of follicular lymphoma of the neck, undergoing R-CHOP chemotherapy.    PLAN:  I had a long discussion with the patient.  He asked several questions repeatedly and I answered these.  I reviewed the etiology of atrial fibrillation, natural history, goal of systemic anticoagulation in stroke and  "thromboembolic complication prevention, importance of attempting to restore sinus rhythm due to downstream long-term effects of valvular regurgitation, atrial dilatation and heart failure.  Given that his left atrium is severely dilated, I suspect he has been in atrial fibrillation chronically.  Nevertheless, an attempt at restoration of sinus rhythm with electrical cardioversion is recommended.  However, patient was initially not keen on systemic anticoagulation.  Says he prefers \"natural therapies\" such as sundeep tea.  He wondered if there were natural treatments for systemic anticoagulation and I reminded him that there are none that I know of.  Reviewed that cardioembolic strokes are large and potentially devastating.  Especially with his history of malignancy, systemic anticoagulation is indicated.  After much back-and-forth, he is agreeable to starting rivaroxaban 20 mg daily which I have prescribed.  He does not want to proceed with electrical cardioversion now.  This can be revisited at his upcoming appointment.  7-day Zio patch heart monitor.  Continue low-dose carvedilol.  Follow-up with cardiology MARIBEL in 3 months.  I have encouraged him to establish care with a primary provider.  Follow-up with me every 2 years.      Froilan Carter MD      New patient.  60 minutes spent by me on the date of the encounter doing chart review, history and exam, documentation and further activities per the note      The longitudinal plan of care for the condition(s) below were addressed during this visit. Due to the added complexity in care, I will continue to support Quincy in the subsequent management of this condition(s) and with the ongoing continuity of care of this condition(s).  New onset atrial fibrillation.      This note was completed in part using dictation via the Dragon voice recognition software. Some word and grammatical errors may occur and must be interpreted in the appropriate clinical context. If " "there are any questions pertaining to this issue, please contact me for further clarification.       Vitals: /69 (BP Location: Left arm, Patient Position: Sitting)   Pulse 72   Ht 1.753 m (5' 9\")   Wt 74.8 kg (164 lb 12.8 oz)   SpO2 100%   BMI 24.34 kg/m    Wt Readings from Last 5 Encounters:   07/24/24 74.8 kg (164 lb 12.8 oz)   07/11/24 76.3 kg (168 lb 4.8 oz)   06/20/24 76.2 kg (168 lb)   06/13/24 78 kg (171 lb 14.4 oz)   06/10/24 83.9 kg (185 lb)         Orders Placed This Encounter   Procedures    Follow-Up with Cardiology           CURRENT MEDICATIONS:  Current Outpatient Medications   Medication Sig Dispense Refill    acyclovir (ZOVIRAX) 400 MG tablet Take 1 tablet (400 mg) by mouth every 12 hours for 30 days 60 tablet 11    carvedilol (COREG) 3.125 MG tablet Take 1 tablet (3.125 mg) by mouth 2 times daily (with meals) for 30 days 60 tablet 0    prochlorperazine (COMPAZINE) 10 MG tablet Take 1 tablet (10 mg) by mouth every 6 hours as needed for nausea or vomiting 30 tablet 2    rivaroxaban ANTICOAGULANT (XARELTO) 20 MG TABS tablet Take 1 tablet (20 mg) by mouth daily (with dinner) 30 tablet 4         ALLERGIES:  Allergies   Allergen Reactions    Penicillin V Unknown     Reaction Date: 08Sep2011; Annotation - [id number]: unknown reaction, childhood reaction    Cats      Other Reaction(s): Rhinitis             "

## 2024-07-24 NOTE — LETTER
7/24/2024    Avelina Peñaloza MD  [address] [address]    RE: Quincy Piercetrev       Dear Colleague,     I had the pleasure of seeing Quincy Keane in the [facility].    SERVICE DATE: July 24, 2024      PRIMARY CARE AND REFERRING PROVIDER:  Avelina Peñaloza  [address] [id number]    REASON FOR VISIT:  New onset atrial fibrillation.    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  At his recent oncology visit, noted to have irregular heart confirmed to be atrial fibrillation on EKG, was appropriately started on low-dose carvedilol 3.125 mg twice daily and referred to cardiology.     Patient has no active medical symptoms from his atrial fibrillation.  He is a vague historian.  Does not have a primary care provider.  Says he prefers natural therapies.    I independently interpreted EKG dated 13 June 2024 which showed rate controlled fibrillation with ventricular rates in the 70s, no ischemic changes, no prior infarct, QTc is normal at 460 ms subsequently underwent an echocardiogram which showed normal left ventricular size, mildly decreased ventricular systolic function with LVEF of 55%, normal right ventricular systolic function, severe left atrial enlargement, no valve disease, normal inferior vena cava.  Pertinent labs show an elevated creatinine at 124, potassium 4.4, hemoglobin 15.0, normal platelets, normal TSH, normal renal panel.    Cardiac auscultation shows rate controlled atrial fibrillation in the 70s, no murmur, no carotid bruit, normal JVP, lungs are clear.  /69.    DIAGNOSES/ASSESSMENT:  Asymptomatic new onset atrial fibrillation, rate controlled on low-dose carvedilol.  Recent diagnosis of follicular lymphoma of the neck, undergoing R-CHOP chemotherapy.    PLAN:  I had a  "long discussion with the patient.  He asked several questions repeatedly and I answered these.  I reviewed the etiology of atrial fibrillation, natural history, goal of systemic anticoagulation in stroke and thromboembolic complication prevention, importance of attempting to restore sinus rhythm due to downstream long-term effects of valvular regurgitation, atrial dilatation and heart failure.  Given that his left atrium is severely dilated, I suspect he has been in atrial fibrillation chronically.  Nevertheless, an attempt at restoration of sinus rhythm with electrical cardioversion is recommended.  However, patient was initially not keen on systemic anticoagulation.  Says he prefers \"natural therapies\" such as sundeep tea.  He wondered if there were natural treatments for systemic anticoagulation and I reminded him that there are none that I know of.  Reviewed that cardioembolic strokes are large and potentially devastating.  Especially with his history of malignancy, systemic anticoagulation is indicated.  After much back-and-forth, he is agreeable to starting rivaroxaban 20 mg daily which I have prescribed.  He does not want to proceed with electrical cardioversion now.  This can be revisited at his upcoming appointment.  7-day Zio patch heart monitor.  Continue low-dose carvedilol.  Follow-up with cardiology MARIBEL in 3 months.  I have encouraged him to establish care with a primary provider.  Follow-up with me every 2 years.      Froilan Carter MD      New patient.  60 minutes spent by me on the date of the encounter doing chart review, history and exam, documentation and further activities per the note      The longitudinal plan of care for the condition(s) below were addressed during this visit. Due to the added complexity in care, I will continue to support Quincy in the subsequent management of this condition(s) and with the ongoing continuity of care of this condition(s).  New onset atrial " "fibrillation.      This note was completed in part using dictation via the Dragon voice recognition software. Some word and grammatical errors may occur and must be interpreted in the appropriate clinical context. If there are any questions pertaining to this issue, please contact me for further clarification.       Vitals: /69 (BP Location: Left arm, Patient Position: Sitting)   Pulse 72   Ht 1.753 m (5' 9\")   Wt 74.8 kg (164 lb 12.8 oz)   SpO2 100%   BMI 24.34 kg/m    Wt Readings from Last 5 Encounters:   07/24/24 74.8 kg (164 lb 12.8 oz)   07/11/24 76.3 kg (168 lb 4.8 oz)   06/20/24 76.2 kg (168 lb)   06/13/24 78 kg (171 lb 14.4 oz)   06/10/24 83.9 kg (185 lb)         Orders Placed This Encounter   Procedures    Follow-Up with Cardiology           CURRENT MEDICATIONS:  Current Outpatient Medications   Medication Sig Dispense Refill    acyclovir (ZOVIRAX) 400 MG tablet Take 1 tablet (400 mg) by mouth every 12 hours for 30 days 60 tablet 11    carvedilol (COREG) 3.125 MG tablet Take 1 tablet (3.125 mg) by mouth 2 times daily (with meals) for 30 days 60 tablet 0    prochlorperazine (COMPAZINE) 10 MG tablet Take 1 tablet (10 mg) by mouth every 6 hours as needed for nausea or vomiting 30 tablet 2    rivaroxaban ANTICOAGULANT (XARELTO) 20 MG TABS tablet Take 1 tablet (20 mg) by mouth daily (with dinner) 30 tablet 4     ALLERGIES:  Allergies   Allergen Reactions    Penicillin V Unknown     Reaction Date: 08Sep2011; Annotation - 09Aug2013: unknown reaction, childhood reaction    Cats      Other Reaction(s): Rhinitis       Thank you for allowing me to participate in the care of your patient.      Sincerely,     Froilan Carter MD     [facility]  cc:   Avelina Peñaloza MD  [address] [address]  "

## 2024-07-24 NOTE — PATIENT INSTRUCTIONS
As discussed, I recommend you start a blood thinner to protect you from strokes from the irregular heartbeat or atrial fibrillation.  Start rivaroxaban 20 mg once daily.  A new prescription has been sent.  A 7-day heart monitor will be mailed to your home.  Follow-up with cardiology nurse practitioner in 3 months.  Please establish care with a primary care provider for your overall general care.

## 2024-07-26 ENCOUNTER — MYC REFILL (OUTPATIENT)
Dept: TRANSPLANT | Facility: CLINIC | Age: 64
End: 2024-07-26
Payer: COMMERCIAL

## 2024-07-26 DIAGNOSIS — I48.20 CHRONIC ATRIAL FIBRILLATION (H): ICD-10-CM

## 2024-07-26 RX ORDER — CARVEDILOL 3.12 MG/1
3.12 TABLET ORAL 2 TIMES DAILY WITH MEALS
Qty: 60 TABLET | Refills: 11 | Status: SHIPPED | OUTPATIENT
Start: 2024-07-26 | End: 2024-08-19 | Stop reason: ALTCHOICE

## 2024-07-26 RX ORDER — CARVEDILOL 3.12 MG/1
3.12 TABLET ORAL 2 TIMES DAILY WITH MEALS
Qty: 60 TABLET | Refills: 0 | Status: SHIPPED | OUTPATIENT
Start: 2024-07-26 | End: 2024-07-26

## 2024-07-26 NOTE — CONFIDENTIAL NOTE
Coreg Refill   Last prescribing provider: Dr Peñaloza     Last clinic visit date: 7/11/24 Dorie Zavala     Recommendations for requested medication (if none, N/A): Copied from chart note   Atrial fibrillation:  - Atrial fibrillation is not an indication to not give anthracycline, but add carvedilol 12.5 BID for cardioprotection. Referred to cardiology and consulted 7/10 (note pending).     Any other pertinent information (if none, N/A): N/A     Refilled: Y/N, if NO, why?

## 2024-07-29 ENCOUNTER — HOSPITAL ENCOUNTER (OUTPATIENT)
Dept: PET IMAGING | Facility: CLINIC | Age: 64
Discharge: HOME | End: 2024-07-29
Payer: COMMERCIAL

## 2024-07-29 DIAGNOSIS — C82.41 GRADE 3B FOLLICULAR LYMPHOMA OF LYMPH NODES OF NECK (H): ICD-10-CM

## 2024-07-29 PROCEDURE — 71260 CT THORAX DX C+: CPT | Mod: 26 | Performed by: STUDENT IN AN ORGANIZED HEALTH CARE EDUCATION/TRAINING PROGRAM

## 2024-07-29 PROCEDURE — 70491 CT SOFT TISSUE NECK W/DYE: CPT

## 2024-07-29 PROCEDURE — 71260 CT THORAX DX C+: CPT

## 2024-07-29 PROCEDURE — 70491 CT SOFT TISSUE NECK W/DYE: CPT | Mod: 26 | Performed by: STUDENT IN AN ORGANIZED HEALTH CARE EDUCATION/TRAINING PROGRAM

## 2024-07-29 PROCEDURE — 74177 CT ABD & PELVIS W/CONTRAST: CPT | Mod: 26 | Performed by: STUDENT IN AN ORGANIZED HEALTH CARE EDUCATION/TRAINING PROGRAM

## 2024-07-29 PROCEDURE — 78816 PET IMAGE W/CT FULL BODY: CPT | Mod: PS

## 2024-07-29 PROCEDURE — 343N000001 HC RX 343

## 2024-07-29 PROCEDURE — 78816 PET IMAGE W/CT FULL BODY: CPT | Mod: 26 | Performed by: STUDENT IN AN ORGANIZED HEALTH CARE EDUCATION/TRAINING PROGRAM

## 2024-07-29 PROCEDURE — 250N000011 HC RX IP 250 OP 636

## 2024-07-29 PROCEDURE — A9552 F18 FDG: HCPCS

## 2024-07-29 RX ORDER — IOPAMIDOL 755 MG/ML
10-135 INJECTION, SOLUTION INTRAVASCULAR ONCE
Status: COMPLETED | OUTPATIENT
Start: 2024-07-29 | End: 2024-07-29

## 2024-07-29 RX ORDER — FLUDEOXYGLUCOSE F 18 200 MCI/ML
10-18 INJECTION, SOLUTION INTRAVENOUS ONCE
Status: COMPLETED | OUTPATIENT
Start: 2024-07-29 | End: 2024-07-29

## 2024-07-29 RX ADMIN — FLUDEOXYGLUCOSE F 18 10.17 MILLICURIE: 200 INJECTION, SOLUTION INTRAVENOUS at 08:20

## 2024-07-29 RX ADMIN — IOPAMIDOL 100 ML: 755 INJECTION, SOLUTION INTRAVENOUS at 09:23

## 2024-07-31 ENCOUNTER — ORDERS ONLY (AUTO-RELEASED) (OUTPATIENT)
Dept: CARDIOLOGY | Facility: CLINIC | Age: 64
End: 2024-07-31

## 2024-07-31 DIAGNOSIS — I48.91 NEW ONSET ATRIAL FIBRILLATION (H): ICD-10-CM

## 2024-07-31 NOTE — PROGRESS NOTES
[facility]  Date of visit: 08/01/2024      Reason for Visit: Follow- up for Follicular Lymphoma      Oncology HPI: This is a 64 year old male with PMH of atrial fibrillation, thyroid dysfunction and probable dermatitis, and newly diagnosed follicular lymphoma.  He was otherwise doing well until he developed a lump on his neck on the left side about in March, 2024. At the time he denied fevers, chills, night sweat or weight loss.  Initial needle biopsy showed atypical lymphoid infiltrate. Subsequent core needle biopsy showed follicular lymphoma grade 3B. IGH/BCL2 rearrangement was positive. No BCL6 or MYC rearrangement. 6/10/24 PET scan showed hypermetabolic left upper cervical lymph nodes and subcarinal lymph node. Overall stage II, non bulky disease. Started R-CHOP (C1D1=6/20/24).  7/29/24 PET s/p C2 demonstrated a CR as evidenced by resolution left cervical adenopathy (Deauville 2) and  mildly hypermetabolic subcarinal node.    Interval history:   [place] presents to clinic for toxicity check prior to C3D1 R-CHOP and PET scan review.  He is overall doing well and feeling well.  He continues to remain busy with work and working when he gets home from work around his house.  His appetite is good and is eating and drinking well  Denies fevers/chills, night sweats, N/V/D/C, neuropathy, rashes/sores,  cough, chest pain, all other acute issues or concerns.    Current Outpatient Medications   Medication Sig Dispense Refill    acyclovir (ZOVIRAX) 400 MG tablet Take 1 tablet (400 mg) by mouth every 12 hours for 30 days 60 tablet 11    carvedilol (COREG) 3.125 MG tablet Take 1 tablet (3.125 mg) by mouth 2 times daily (with meals) for 30 days 60 tablet 11    rivaroxaban ANTICOAGULANT (XARELTO) 20 MG TABS tablet Take 1 tablet (20 mg) by mouth daily (with dinner) 30 tablet 4    prochlorperazine (COMPAZINE) 10 MG tablet Take 1 tablet (10 mg) by mouth every 6 hours as needed for nausea or  vomiting (Patient not taking: Reported on 8/1/2024) 30 tablet 2       Allergies   Allergen Reactions    Penicillin V Unknown     Reaction Date: 08Sep2011; Annotation - [id number]: unknown reaction, childhood reaction    Cats      Other Reaction(s): Rhinitis         Physical Exam:  /80 (BP Location: Right arm, Patient Position: Sitting, Cuff Size: Adult Regular)   Pulse 60   Temp 97.7  F (36.5  C) (Oral)   Wt 75.6 kg (166 lb 11.2 oz)   SpO2 100%   BMI 24.62 kg/m    Objective:  General: No acute distress, pleasant, well-groomed  HEENT: Sclera anicteric. Oral exam deferred  Heart: Regular rate, irregular rhythm  Lungs: Clear to ascultation bilaterally  Extremities: no lower extremity edema  Neuro: Cranial nerves grossly intact  Rash: none on exposed skin   Vascular access: PIV    Labs:   Most Recent 3 CBC's:  Recent Labs   Lab Test 08/01/24  1007 07/11/24  1000 06/20/24  0648   WBC 5.5 5.8 7.0   HGB 13.0* 14.0 15.5   MCV 91 90 88    305 215   ANEUTAUTO 4.3 4.3 4.3     Most Recent 3 BMP's:  Recent Labs   Lab Test 08/01/24  1007 07/11/24  1000 06/20/24  0648    138 140   POTASSIUM 4.4 4.4 4.0   CHLORIDE 105 103 103   CO2 29 28 29   BUN 24.3* 15.5 16.8   CR 0.68 0.74 0.80   ANIONGAP 7 7 8   TERRA 9.1 8.9 9.4   GLC 86 90 81   PROTTOTAL 6.6 6.8 7.2   ALBUMIN 4.1 4.2 4.3    Most Recent 3 LFT's:  Recent Labs   Lab Test 08/01/24  1007 07/11/24  1000 06/20/24  0648   AST 21 22 21   ALT 14 13 12   ALKPHOS 69 78 87   BILITOTAL 0.5 0.4 0.7    Most Recent 2 TSH and T4:  Recent Labs   Lab Test 07/11/24  1004 06/20/24  0648 05/14/24  1048   TSH 0.34 <0.01* <0.01*   T4  --  1.29 1.43     I reviewed the above labs today.    Imaging:  CT Soft Tissue Neck w Contrast  Narrative: Combined Report of: PET and CT on 7/29/2024 10:06 AM:     1. PET of the neck, chest, abdomen, and pelvis.  2. PET CT Fusion for Attenuation Correction and Anatomical  Localization.  3. Diagnostic CT of the neck, chest, abdomen and pelvis  with  intravenous contrast obtained for diagnostic interpretation.  4. 3D MIP and PET-CT fused images were processed on an independent  workstation and archived to PACS and reviewed by a radiologist.    Technique:    1. PET: The patient received 10.17 mCi of F-18-FDG. The serum glucose  was 99 mg/dL prior to administration. Body weight was 74.8 kg. Images  were evaluated in the axial, sagittal, and coronal planes as well as  the rotational whole body MIP. Images were acquired from cranial  vertex to feet.    UPTAKE WAS MEASURED AT 65 MINUTES.     2. CT: Volumetric acquisition for clinical interpretation of the  chest, abdomen, and pelvis acquired at 3 mm sections. The chest,  abdomen, and pelvis were evaluated at 5 mm sections in bone, soft  tissue, and lung windows. High resolution images of the neck were  obtained with multiple oblique projection reformats.    Contrast and Medications:  IV contrast: 100 mL of Isovue 370 intravenously.  PO contrast: None.  Additional Medications: None.    3. 3D MIP and PET-CT fused images were processed on an independent  workstation and archived to PACS and reviewed by a radiologist.    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    ADDITIONAL INFORMATION OBTAINED FROM EMR: Recent diagnosis, treatment  follow up after Rituximab / Doxorubicin / Vincristine /  Cyclophosphamide / Prednisone (cyc 2/6).    COMPARISON: PET/CT 6/10/2024.    FINDINGS:     BACKGROUND: Liver SUV max = 3.1, Aorta Blood SUV max = 2.6.     HEAD/NECK:    Lymph nodes: Resolution of prior FDG avid enlarged left level 2b lymph  node measuring 1.0 x 0.5 cm (3/133) with SUV max 1.3, previously 2.4 x  1.5 cm. With SUV max 22.6.    Pharyngeal mucosal spaces: Diffusely decreased tonsillar uptake.  Nasopharynx and palatine tonsils are within normal limits. Tongue base  is normal. Oral tongue and buccal mucosa of the oral cavity is normal.  Oropharynx, hypopharynx and larynx are within normal  limits.    Sinonasal region: Mild scattered mucosal thickening in the paranasal  sinuses. No mass within the nasal cavity.    Salivary glands: The major salivary glands are within normal limits.     Thyroid gland: Interval resolution of mild diffuse thyroid uptake.  Unchanged 0.2 cm hypoattenuating right thyroid nodule.    Vascular structures: The major vasculature of the neck are patent.    Brain: No abnormal FDG avid lesion or abnormal enhancement.     Orbital cavities: No abnormal FDG avid lesion or abnormal enhancement.    CHEST:    Lymph nodes: Resolution of prior FDG avid subcarinal lymph node  measuring 1.2 x 0.6 cm with SUV max 2.5, previously 1.8 x 0.9 cm with  SUV max 5.2. No new FDG avid or abnormally enlarged lymph nodes.    Lungs: Mild subpleural scarring in the lung apices. Scattered mucous  plugging. No acute consolidation.  No pleural effusion or  pneumothorax. Mild bilateral dependent atelectasis.    Heart and great vessels: Cardiomegaly with mildly decreased diffuse  atrial wall uptake. No pericardial effusion. Main pulmonary artery  measures up to 3.6 cm in diameter. The esophagus is unremarkable.     ABDOMEN AND PELVIS:    Liver: No FDG avid lesion. Similar hypoattenuating lesions and  geographic steatosis. No intrahepatic or extrahepatic biliary ductal  dilatation. Gallbladder is within normal limits.     Pancreas: The pancreas is within normal limits. No pancreatic ductal  dilatation.     Spleen: No FDG avid lesion.    Adrenal glands: No FDG avid foci.    Kidneys: No FDG avid lesion. No hydronephrosis. Bilateral renal cysts.  The urinary bladder is moderately distended.     Prostatomegaly with prostatic parenchymal calcifications. Pelvic  phleboliths. Mild perianal uptake, which may represent inflamed  hemorrhoids.    Gastrointestinal system: Normal caliber of the small and large bowel.  Colonic diverticulosis    Lymph nodes: No FDG avid or abnormally enlarged lymph nodes.    Vascular  structures: Normal caliber of the abdominal aorta.    Minimal peritoneal fluid. Tiny fat-containing umbilical hernia.    EXTREMITIES:     No abnormal FDG uptake in the visualized extremities.    BONES AND SOFT TISSUES:   Persistent mild uptake associated with similar appearing 0.5 cm oval  lytic lesion in posterior right 8th rib (3/275) with SUV max 4.6,  previously 3.9.    Surgical screws in medial right femoral condyle, possibly prior MCL  reconstruction. Degenerative changes in right knee with small joint  effusion and intra-articular calcification with continued mild  inflammatory uptake. Degenerative uptake of right hip with subchondral  cystic and sclerotic changes. Moderate scoliotic curvature of the  thoracolumbar spine, and multilevel degenerative changes in the spine.  Impression: IMPRESSION:     1. Complete response as evidenced by resolution of prior  hypermetabolic left cervical adenopathy (Deauville 2). Resolution of  prior mildly hypermetabolic subcarinal node, treatment response versus  resolved inflammation.    2. Similar appearing mildly hypermetabolic subcentimeter lytic lesion  in right posterior eighth rib, likely unrelated to lymphoma. Continue  attention on follow-up.    3. Resolved thyroiditis.    Lugano Criteria for Lymphoma response to therapy methodology  Reported as: ex.  Lugano Criteria: Complete Response    PET response  Used for FDG avid Lymphomas (Hodgkins & Diffuse Large B-Cell)  Complete                     <=Liver  (Deauville 1-3)  Partial                          > Liver & decreased from baseline   (Deauville 4-5)  Stable/No response     > Liver & no change from baseline  (Deauville  4-5)  Progressive                 > Liver & Increased or new FDG avid  lesion (Deauville 4-5)    CT response  Used for variable uptake Lymphomas (Follicular, Marginal zone, CLL,  Mantle Cell)  Complete                     all nodes <1.5 cm  (longest diameter)  Partial                          Shrank >  50%        (SPD*)  Stable/No response      Shrank <50%         (SPD*)  Progressive disease      New or Increased size of lesions    *SPD = (sum of up to six lesions (longest x shortest diameter)    Source: Jacoby et al.  Imaging for Staging and Response Assessment in  Lymphoma.  Radiology August 2015.    _____________________________________________    The Deauville 5-point scoring system is an internationally accepted  and utilized five-point scoring system for the fluorodeoxyglucose  (FDG) avidity of a Hodgkin lymphoma or Non-Hodgkin lymphoma tumor mass  as seen on FDG positron emission tomography:[1]    Score 1: No uptake above the background  Score 2: Uptake ? mediastinum  Score 3: Uptake > mediastinum but ? liver  Score 4: Uptake moderately increased compared to the liver at any site  Score 5: Uptake markedly increased compared to the liver at any site  Score X: New areas of uptake unlikely to be related to lymphoma    I have personally reviewed the examination and initial interpretation  and I agree with the findings.    SHIV TA MD         SYSTEM ID:  [id number]  CT Chest/Abdomen/Pelvis w Contrast  Narrative: Combined Report of: PET and CT on 7/29/2024 10:06 AM:     1. PET of the neck, chest, abdomen, and pelvis.  2. PET CT Fusion for Attenuation Correction and Anatomical  Localization.  3. Diagnostic CT of the neck, chest, abdomen and pelvis with  intravenous contrast obtained for diagnostic interpretation.  4. 3D MIP and PET-CT fused images were processed on an independent  workstation and archived to PACS and reviewed by a radiologist.    Technique:    1. PET: The patient received 10.17 mCi of F-18-FDG. The serum glucose  was 99 mg/dL prior to administration. Body weight was 74.8 kg. Images  were evaluated in the axial, sagittal, and coronal planes as well as  the rotational whole body MIP. Images were acquired from cranial  vertex to feet.    UPTAKE WAS MEASURED AT 65 MINUTES.     2. CT: Volumetric  acquisition for clinical interpretation of the  chest, abdomen, and pelvis acquired at 3 mm sections. The chest,  abdomen, and pelvis were evaluated at 5 mm sections in bone, soft  tissue, and lung windows. High resolution images of the neck were  obtained with multiple oblique projection reformats.    Contrast and Medications:  IV contrast: 100 mL of Isovue 370 intravenously.  PO contrast: None.  Additional Medications: None.    3. 3D MIP and PET-CT fused images were processed on an independent  workstation and archived to PACS and reviewed by a radiologist.    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    ADDITIONAL INFORMATION OBTAINED FROM EMR: Recent diagnosis, treatment  follow up after Rituximab / Doxorubicin / Vincristine /  Cyclophosphamide / Prednisone (cyc 2/6).    COMPARISON: PET/CT 6/10/2024.    FINDINGS:     BACKGROUND: Liver SUV max = 3.1, Aorta Blood SUV max = 2.6.     HEAD/NECK:    Lymph nodes: Resolution of prior FDG avid enlarged left level 2b lymph  node measuring 1.0 x 0.5 cm (3/133) with SUV max 1.3, previously 2.4 x  1.5 cm. With SUV max 22.6.    Pharyngeal mucosal spaces: Diffusely decreased tonsillar uptake.  Nasopharynx and palatine tonsils are within normal limits. Tongue base  is normal. Oral tongue and buccal mucosa of the oral cavity is normal.  Oropharynx, hypopharynx and larynx are within normal limits.    Sinonasal region: Mild scattered mucosal thickening in the paranasal  sinuses. No mass within the nasal cavity.    Salivary glands: The major salivary glands are within normal limits.     Thyroid gland: Interval resolution of mild diffuse thyroid uptake.  Unchanged 0.2 cm hypoattenuating right thyroid nodule.    Vascular structures: The major vasculature of the neck are patent.    Brain: No abnormal FDG avid lesion or abnormal enhancement.     Orbital cavities: No abnormal FDG avid lesion or abnormal enhancement.    CHEST:    Lymph nodes: Resolution of  prior FDG avid subcarinal lymph node  measuring 1.2 x 0.6 cm with SUV max 2.5, previously 1.8 x 0.9 cm with  SUV max 5.2. No new FDG avid or abnormally enlarged lymph nodes.    Lungs: Mild subpleural scarring in the lung apices. Scattered mucous  plugging. No acute consolidation.  No pleural effusion or  pneumothorax. Mild bilateral dependent atelectasis.    Heart and great vessels: Cardiomegaly with mildly decreased diffuse  atrial wall uptake. No pericardial effusion. Main pulmonary artery  measures up to 3.6 cm in diameter. The esophagus is unremarkable.     ABDOMEN AND PELVIS:    Liver: No FDG avid lesion. Similar hypoattenuating lesions and  geographic steatosis. No intrahepatic or extrahepatic biliary ductal  dilatation. Gallbladder is within normal limits.     Pancreas: The pancreas is within normal limits. No pancreatic ductal  dilatation.     Spleen: No FDG avid lesion.    Adrenal glands: No FDG avid foci.    Kidneys: No FDG avid lesion. No hydronephrosis. Bilateral renal cysts.  The urinary bladder is moderately distended.     Prostatomegaly with prostatic parenchymal calcifications. Pelvic  phleboliths. Mild perianal uptake, which may represent inflamed  hemorrhoids.    Gastrointestinal system: Normal caliber of the small and large bowel.  Colonic diverticulosis    Lymph nodes: No FDG avid or abnormally enlarged lymph nodes.    Vascular structures: Normal caliber of the abdominal aorta.    Minimal peritoneal fluid. Tiny fat-containing umbilical hernia.    EXTREMITIES:     No abnormal FDG uptake in the visualized extremities.    BONES AND SOFT TISSUES:   Persistent mild uptake associated with similar appearing 0.5 cm oval  lytic lesion in posterior right 8th rib (3/275) with SUV max 4.6,  previously 3.9.    Surgical screws in medial right femoral condyle, possibly prior MCL  reconstruction. Degenerative changes in right knee with small joint  effusion and intra-articular calcification with continued  mild  inflammatory uptake. Degenerative uptake of right hip with subchondral  cystic and sclerotic changes. Moderate scoliotic curvature of the  thoracolumbar spine, and multilevel degenerative changes in the spine.  Impression: IMPRESSION:     1. Complete response as evidenced by resolution of prior  hypermetabolic left cervical adenopathy (Deauville 2). Resolution of  prior mildly hypermetabolic subcarinal node, treatment response versus  resolved inflammation.    2. Similar appearing mildly hypermetabolic subcentimeter lytic lesion  in right posterior eighth rib, likely unrelated to lymphoma. Continue  attention on follow-up.    3. Resolved thyroiditis.    Lugano Criteria for Lymphoma response to therapy methodology  Reported as: ex.  Lugano Criteria: Complete Response    PET response  Used for FDG avid Lymphomas (Hodgkins & Diffuse Large B-Cell)  Complete                     <=Liver  (Deauville 1-3)  Partial                          > Liver & decreased from baseline   (Deauville 4-5)  Stable/No response     > Liver & no change from baseline  (Deauville  4-5)  Progressive                 > Liver & Increased or new FDG avid  lesion (Deauville 4-5)    CT response  Used for variable uptake Lymphomas (Follicular, Marginal zone, CLL,  Mantle Cell)  Complete                     all nodes <1.5 cm  (longest diameter)  Partial                          Shrank > 50%        (SPD*)  Stable/No response      Shrank <50%         (SPD*)  Progressive disease      New or Increased size of lesions    *SPD = (sum of up to six lesions (longest x shortest diameter)    Source: Jacoby et al.  Imaging for Staging and Response Assessment in  Lymphoma.  Radiology August 2015.    _____________________________________________    The Deauville 5-point scoring system is an internationally accepted  and utilized five-point scoring system for the fluorodeoxyglucose  (FDG) avidity of a Hodgkin lymphoma or Non-Hodgkin lymphoma tumor mass  as seen  on FDG positron emission tomography:[1]    Score 1: No uptake above the background  Score 2: Uptake ? mediastinum  Score 3: Uptake > mediastinum but ? liver  Score 4: Uptake moderately increased compared to the liver at any site  Score 5: Uptake markedly increased compared to the liver at any site  Score X: New areas of uptake unlikely to be related to lymphoma    I have personally reviewed the examination and initial interpretation  and I agree with the findings.    SHIV TA MD         SYSTEM ID:  [id number]  PET Oncology Whole Body  Narrative: Combined Report of: PET and CT on 7/29/2024 10:06 AM:     1. PET of the neck, chest, abdomen, and pelvis.  2. PET CT Fusion for Attenuation Correction and Anatomical  Localization.  3. Diagnostic CT of the neck, chest, abdomen and pelvis with  intravenous contrast obtained for diagnostic interpretation.  4. 3D MIP and PET-CT fused images were processed on an independent  workstation and archived to PACS and reviewed by a radiologist.    Technique:    1. PET: The patient received 10.17 mCi of F-18-FDG. The serum glucose  was 99 mg/dL prior to administration. Body weight was 74.8 kg. Images  were evaluated in the axial, sagittal, and coronal planes as well as  the rotational whole body MIP. Images were acquired from cranial  vertex to feet.    UPTAKE WAS MEASURED AT 65 MINUTES.     2. CT: Volumetric acquisition for clinical interpretation of the  chest, abdomen, and pelvis acquired at 3 mm sections. The chest,  abdomen, and pelvis were evaluated at 5 mm sections in bone, soft  tissue, and lung windows. High resolution images of the neck were  obtained with multiple oblique projection reformats.    Contrast and Medications:  IV contrast: 100 mL of Isovue 370 intravenously.  PO contrast: None.  Additional Medications: None.    3. 3D MIP and PET-CT fused images were processed on an independent  workstation and archived to PACS and reviewed by a radiologist.    INDICATION:  Grade 3b follicular lymphoma of lymph nodes of neck (H).    ADDITIONAL INFORMATION OBTAINED FROM EMR: Recent diagnosis, treatment  follow up after Rituximab / Doxorubicin / Vincristine /  Cyclophosphamide / Prednisone (cyc 2/6).    COMPARISON: PET/CT 6/10/2024.    FINDINGS:     BACKGROUND: Liver SUV max = 3.1, Aorta Blood SUV max = 2.6.     HEAD/NECK:    Lymph nodes: Resolution of prior FDG avid enlarged left level 2b lymph  node measuring 1.0 x 0.5 cm (3/133) with SUV max 1.3, previously 2.4 x  1.5 cm. With SUV max 22.6.    Pharyngeal mucosal spaces: Diffusely decreased tonsillar uptake.  Nasopharynx and palatine tonsils are within normal limits. Tongue base  is normal. Oral tongue and buccal mucosa of the oral cavity is normal.  Oropharynx, hypopharynx and larynx are within normal limits.    Sinonasal region: Mild scattered mucosal thickening in the paranasal  sinuses. No mass within the nasal cavity.    Salivary glands: The major salivary glands are within normal limits.     Thyroid gland: Interval resolution of mild diffuse thyroid uptake.  Unchanged 0.2 cm hypoattenuating right thyroid nodule.    Vascular structures: The major vasculature of the neck are patent.    Brain: No abnormal FDG avid lesion or abnormal enhancement.     Orbital cavities: No abnormal FDG avid lesion or abnormal enhancement.    CHEST:    Lymph nodes: Resolution of prior FDG avid subcarinal lymph node  measuring 1.2 x 0.6 cm with SUV max 2.5, previously 1.8 x 0.9 cm with  SUV max 5.2. No new FDG avid or abnormally enlarged lymph nodes.    Lungs: Mild subpleural scarring in the lung apices. Scattered mucous  plugging. No acute consolidation.  No pleural effusion or  pneumothorax. Mild bilateral dependent atelectasis.    Heart and great vessels: Cardiomegaly with mildly decreased diffuse  atrial wall uptake. No pericardial effusion. Main pulmonary artery  measures up to 3.6 cm in diameter. The esophagus is unremarkable.     ABDOMEN AND  PELVIS:    Liver: No FDG avid lesion. Similar hypoattenuating lesions and  geographic steatosis. No intrahepatic or extrahepatic biliary ductal  dilatation. Gallbladder is within normal limits.     Pancreas: The pancreas is within normal limits. No pancreatic ductal  dilatation.     Spleen: No FDG avid lesion.    Adrenal glands: No FDG avid foci.    Kidneys: No FDG avid lesion. No hydronephrosis. Bilateral renal cysts.  The urinary bladder is moderately distended.     Prostatomegaly with prostatic parenchymal calcifications. Pelvic  phleboliths. Mild perianal uptake, which may represent inflamed  hemorrhoids.    Gastrointestinal system: Normal caliber of the small and large bowel.  Colonic diverticulosis    Lymph nodes: No FDG avid or abnormally enlarged lymph nodes.    Vascular structures: Normal caliber of the abdominal aorta.    Minimal peritoneal fluid. Tiny fat-containing umbilical hernia.    EXTREMITIES:     No abnormal FDG uptake in the visualized extremities.    BONES AND SOFT TISSUES:   Persistent mild uptake associated with similar appearing 0.5 cm oval  lytic lesion in posterior right 8th rib (3/275) with SUV max 4.6,  previously 3.9.    Surgical screws in medial right femoral condyle, possibly prior MCL  reconstruction. Degenerative changes in right knee with small joint  effusion and intra-articular calcification with continued mild  inflammatory uptake. Degenerative uptake of right hip with subchondral  cystic and sclerotic changes. Moderate scoliotic curvature of the  thoracolumbar spine, and multilevel degenerative changes in the spine.  Impression: IMPRESSION:     1. Complete response as evidenced by resolution of prior  hypermetabolic left cervical adenopathy (Deauville 2). Resolution of  prior mildly hypermetabolic subcarinal node, treatment response versus  resolved inflammation.    2. Similar appearing mildly hypermetabolic subcentimeter lytic lesion  in right posterior eighth rib, likely  unrelated to lymphoma. Continue  attention on follow-up.    3. Resolved thyroiditis.    Lugano Criteria for Lymphoma response to therapy methodology  Reported as: ex.  Lugano Criteria: Complete Response    PET response  Used for FDG avid Lymphomas (Hodgkins & Diffuse Large B-Cell)  Complete                     <=Liver  (Deauville 1-3)  Partial                          > Liver & decreased from baseline   (Deauville 4-5)  Stable/No response     > Liver & no change from baseline  (Deauville  4-5)  Progressive                 > Liver & Increased or new FDG avid  lesion (Deauville 4-5)    CT response  Used for variable uptake Lymphomas (Follicular, Marginal zone, CLL,  Mantle Cell)  Complete                     all nodes <1.5 cm  (longest diameter)  Partial                          Shrank > 50%        (SPD*)  Stable/No response      Shrank <50%         (SPD*)  Progressive disease      New or Increased size of lesions    *SPD = (sum of up to six lesions (longest x shortest diameter)    Source: Jacoby et al.  Imaging for Staging and Response Assessment in  Lymphoma.  Radiology August 2015.    _____________________________________________    The [place]ville 5-point scoring system is an internationally accepted  and utilized five-point scoring system for the fluorodeoxyglucose  (FDG) avidity of a Hodgkin lymphoma or Non-Hodgkin lymphoma tumor mass  as seen on FDG positron emission tomography:[1]    Score 1: No uptake above the background  Score 2: Uptake ? mediastinum  Score 3: Uptake > mediastinum but ? liver  Score 4: Uptake moderately increased compared to the liver at any site  Score 5: Uptake markedly increased compared to the liver at any site  Score X: New areas of uptake unlikely to be related to lymphoma    I have personally reviewed the examination and initial interpretation  and I agree with the findings.    SHIV TA MD         SYSTEM ID:  [id number]    I reviewed the above imaging today.      Impression/plan:    High grade follicular lymphoma 3B:  - Previously reviewed natural history, prognosis, management of follicular lymphoma 3B with the patient. Treatment of FL 3B is in line with the DLBCL. Multi-agent chemotherapy with rituximab, cyclophosphamide, doxorubicin, vincristine and prednisone is the treatment of choice.  Previously discussed common toxicities include but are not limited to nausea, vomiting, diarrhea, rashes, and neuropathy.    - 6/20/24 C1D1 R-CHOP  - 7/29/24 PET demonstrated complete response! We discussed PET results today (8/1).  Long discussion today with patient and his family member today regarding continuing treatment.  Patient had lots of questions why we needed to continue to proceed with 6 cycles of R-CHOP when we had a CR after C2.  We dicussed he has high risk disease and best chance of remission and staying in remission is completing a total 6 cycles.  Discussed risk of relapse within 2 years of finishing treatment is ~20% but could be increased if he does not finish the full 6 cycles.  Discussed, the choice is his but medically we would not recommend stopping treatment early.  Patient agreed to proceed with treatment today and can discuss further prior to C4.  - S/p C2 and tolerating treatment well and not experiencing significant side-effects/toxicities.   - Proceed with C3D1 R-CHOP today (8/1)    Ppx:  - Acyclovir 400 mg BID    Atrial fibrillation:  - Atrial fibrillation is not an indication to not give anthracycline, but add carvedilol 12.5 BID for cardioprotection.   -  Consulted with cardiology 7/10/24  Xarelto 20 mg daily   Continue Carvedilol 12.5 mg BID  Recommend cardioversion- patient would like to avoid for now and can readdress in the future  7-day zio patch- not  yet performed as of 8/1  Follow-up with cardiology in 3 months       42 minutes spent on the date of the encounter doing chart review, review of test results, interpretation of tests, patient visit, and documentation      The longitudinal plan of care for the diagnosis(es)/condition(s) as documented were addressed during this visit. Due to the added complexity in care, I will continue to support Quincy in the subsequent management and with ongoing continuity of care.     MI Lucas [facility] [address] [address]  [phone]

## 2024-08-01 ENCOUNTER — APPOINTMENT (OUTPATIENT)
Dept: LAB | Facility: CLINIC | Age: 64
End: 2024-08-01
Attending: STUDENT IN AN ORGANIZED HEALTH CARE EDUCATION/TRAINING PROGRAM
Payer: COMMERCIAL

## 2024-08-01 ENCOUNTER — ONCOLOGY VISIT (OUTPATIENT)
Dept: ONCOLOGY | Facility: CLINIC | Age: 64
End: 2024-08-01
Attending: STUDENT IN AN ORGANIZED HEALTH CARE EDUCATION/TRAINING PROGRAM
Payer: COMMERCIAL

## 2024-08-01 VITALS
TEMPERATURE: 97.7 F | OXYGEN SATURATION: 100 % | BODY MASS INDEX: 24.62 KG/M2 | DIASTOLIC BLOOD PRESSURE: 80 MMHG | HEART RATE: 60 BPM | SYSTOLIC BLOOD PRESSURE: 120 MMHG | WEIGHT: 166.7 LBS

## 2024-08-01 DIAGNOSIS — I48.20 CHRONIC ATRIAL FIBRILLATION (H): ICD-10-CM

## 2024-08-01 DIAGNOSIS — C82.41 GRADE 3B FOLLICULAR LYMPHOMA OF LYMPH NODES OF NECK (H): Primary | ICD-10-CM

## 2024-08-01 DIAGNOSIS — Z51.11 ENCOUNTER FOR ANTINEOPLASTIC CHEMOTHERAPY: ICD-10-CM

## 2024-08-01 LAB
ALBUMIN SERPL BCG-MCNC: 4.1 G/DL (ref 3.5–5.2)
ALP SERPL-CCNC: 69 U/L (ref 40–150)
ALT SERPL W P-5'-P-CCNC: 14 U/L (ref 0–70)
ANION GAP SERPL CALCULATED.3IONS-SCNC: 7 MMOL/L (ref 7–15)
AST SERPL W P-5'-P-CCNC: 21 U/L (ref 0–45)
BASOPHILS # BLD AUTO: 0.1 10E3/UL (ref 0–0.2)
BASOPHILS NFR BLD AUTO: 1 %
BILIRUB SERPL-MCNC: 0.5 MG/DL
BUN SERPL-MCNC: 24.3 MG/DL (ref 8–23)
CALCIUM SERPL-MCNC: 9.1 MG/DL (ref 8.8–10.4)
CHLORIDE SERPL-SCNC: 105 MMOL/L (ref 98–107)
CREAT SERPL-MCNC: 0.68 MG/DL (ref 0.67–1.17)
EGFRCR SERPLBLD CKD-EPI 2021: >90 ML/MIN/1.73M2
EOSINOPHIL # BLD AUTO: 0 10E3/UL (ref 0–0.7)
EOSINOPHIL NFR BLD AUTO: 0 %
ERYTHROCYTE [DISTWIDTH] IN BLOOD BY AUTOMATED COUNT: 15.4 % (ref 10–15)
GLUCOSE SERPL-MCNC: 86 MG/DL (ref 70–99)
HCO3 SERPL-SCNC: 29 MMOL/L (ref 22–29)
HCT VFR BLD AUTO: 38.1 % (ref 40–53)
HGB BLD-MCNC: 13 G/DL (ref 13.3–17.7)
IMM GRANULOCYTES # BLD: 0 10E3/UL
IMM GRANULOCYTES NFR BLD: 1 %
LDH SERPL L TO P-CCNC: 200 U/L (ref 0–250)
LYMPHOCYTES # BLD AUTO: 0.5 10E3/UL (ref 0.8–5.3)
LYMPHOCYTES NFR BLD AUTO: 9 %
MCH RBC QN AUTO: 31.2 PG (ref 26.5–33)
MCHC RBC AUTO-ENTMCNC: 34.1 G/DL (ref 31.5–36.5)
MCV RBC AUTO: 91 FL (ref 78–100)
MONOCYTES # BLD AUTO: 0.6 10E3/UL (ref 0–1.3)
MONOCYTES NFR BLD AUTO: 11 %
NEUTROPHILS # BLD AUTO: 4.3 10E3/UL (ref 1.6–8.3)
NEUTROPHILS NFR BLD AUTO: 78 %
NRBC # BLD AUTO: 0 10E3/UL
NRBC BLD AUTO-RTO: 0 /100
PLATELET # BLD AUTO: 272 10E3/UL (ref 150–450)
POTASSIUM SERPL-SCNC: 4.4 MMOL/L (ref 3.4–5.3)
PROT SERPL-MCNC: 6.6 G/DL (ref 6.4–8.3)
RBC # BLD AUTO: 4.17 10E6/UL (ref 4.4–5.9)
SODIUM SERPL-SCNC: 141 MMOL/L (ref 135–145)
URATE SERPL-MCNC: 4.8 MG/DL (ref 3.4–7)
WBC # BLD AUTO: 5.5 10E3/UL (ref 4–11)

## 2024-08-01 PROCEDURE — G2211 COMPLEX E/M VISIT ADD ON: HCPCS

## 2024-08-01 PROCEDURE — 96377 APPLICATON ON-BODY INJECTOR: CPT | Mod: 59

## 2024-08-01 PROCEDURE — 96411 CHEMO IV PUSH ADDL DRUG: CPT

## 2024-08-01 PROCEDURE — 99213 OFFICE O/P EST LOW 20 MIN: CPT

## 2024-08-01 PROCEDURE — 85025 COMPLETE CBC W/AUTO DIFF WBC: CPT

## 2024-08-01 PROCEDURE — 250N000011 HC RX IP 250 OP 636

## 2024-08-01 PROCEDURE — 250N000013 HC RX MED GY IP 250 OP 250 PS 637

## 2024-08-01 PROCEDURE — 96367 TX/PROPH/DG ADDL SEQ IV INF: CPT

## 2024-08-01 PROCEDURE — 96372 THER/PROPH/DIAG INJ SC/IM: CPT

## 2024-08-01 PROCEDURE — 96413 CHEMO IV INFUSION 1 HR: CPT

## 2024-08-01 PROCEDURE — 84550 ASSAY OF BLOOD/URIC ACID: CPT

## 2024-08-01 PROCEDURE — 96415 CHEMO IV INFUSION ADDL HR: CPT

## 2024-08-01 PROCEDURE — 99215 OFFICE O/P EST HI 40 MIN: CPT

## 2024-08-01 PROCEDURE — 258N000003 HC RX IP 258 OP 636

## 2024-08-01 PROCEDURE — 83615 LACTATE (LD) (LDH) ENZYME: CPT

## 2024-08-01 PROCEDURE — 96417 CHEMO IV INFUS EACH ADDL SEQ: CPT

## 2024-08-01 PROCEDURE — 96375 TX/PRO/DX INJ NEW DRUG ADDON: CPT

## 2024-08-01 PROCEDURE — 80053 COMPREHEN METABOLIC PANEL: CPT

## 2024-08-01 PROCEDURE — 36415 COLL VENOUS BLD VENIPUNCTURE: CPT

## 2024-08-01 RX ORDER — PALONOSETRON 0.05 MG/ML
0.25 INJECTION, SOLUTION INTRAVENOUS ONCE
Status: COMPLETED | OUTPATIENT
Start: 2024-08-01 | End: 2024-08-01

## 2024-08-01 RX ORDER — ACETAMINOPHEN 325 MG/1
650 TABLET ORAL ONCE
Status: CANCELLED
Start: 2024-08-01

## 2024-08-01 RX ORDER — MEPERIDINE HYDROCHLORIDE 25 MG/ML
25 INJECTION INTRAMUSCULAR; INTRAVENOUS; SUBCUTANEOUS EVERY 30 MIN PRN
Status: CANCELLED | OUTPATIENT
Start: 2024-08-01

## 2024-08-01 RX ORDER — METHYLPREDNISOLONE SODIUM SUCCINATE 125 MG/2ML
125 INJECTION, POWDER, LYOPHILIZED, FOR SOLUTION INTRAMUSCULAR; INTRAVENOUS
Status: CANCELLED
Start: 2024-08-01

## 2024-08-01 RX ORDER — LORAZEPAM 2 MG/ML
0.5 INJECTION INTRAMUSCULAR EVERY 4 HOURS PRN
Status: CANCELLED | OUTPATIENT
Start: 2024-08-01

## 2024-08-01 RX ORDER — ALBUTEROL SULFATE 0.83 MG/ML
2.5 SOLUTION RESPIRATORY (INHALATION)
Status: CANCELLED | OUTPATIENT
Start: 2024-08-01

## 2024-08-01 RX ORDER — DIPHENHYDRAMINE HCL 25 MG
50 CAPSULE ORAL ONCE
Status: COMPLETED | OUTPATIENT
Start: 2024-08-01 | End: 2024-08-01

## 2024-08-01 RX ORDER — DIPHENHYDRAMINE HYDROCHLORIDE 50 MG/ML
50 INJECTION INTRAMUSCULAR; INTRAVENOUS
Status: CANCELLED
Start: 2024-08-01

## 2024-08-01 RX ORDER — DIPHENHYDRAMINE HCL 25 MG
50 CAPSULE ORAL ONCE
Status: CANCELLED
Start: 2024-08-01

## 2024-08-01 RX ORDER — ALBUTEROL SULFATE 90 UG/1
1-2 AEROSOL, METERED RESPIRATORY (INHALATION)
Status: CANCELLED
Start: 2024-08-01

## 2024-08-01 RX ORDER — HEPARIN SODIUM,PORCINE 10 UNIT/ML
5-20 VIAL (ML) INTRAVENOUS DAILY PRN
Status: CANCELLED | OUTPATIENT
Start: 2024-08-01

## 2024-08-01 RX ORDER — ACETAMINOPHEN 325 MG/1
650 TABLET ORAL ONCE
Status: COMPLETED | OUTPATIENT
Start: 2024-08-01 | End: 2024-08-01

## 2024-08-01 RX ORDER — PALONOSETRON 0.05 MG/ML
0.25 INJECTION, SOLUTION INTRAVENOUS ONCE
Status: CANCELLED
Start: 2024-08-01

## 2024-08-01 RX ORDER — EPINEPHRINE 1 MG/ML
0.3 INJECTION, SOLUTION INTRAMUSCULAR; SUBCUTANEOUS EVERY 5 MIN PRN
Status: CANCELLED | OUTPATIENT
Start: 2024-08-01

## 2024-08-01 RX ORDER — DOXORUBICIN HYDROCHLORIDE 2 MG/ML
50 INJECTION, SOLUTION INTRAVENOUS ONCE
Status: CANCELLED | OUTPATIENT
Start: 2024-08-01

## 2024-08-01 RX ORDER — MEPERIDINE HYDROCHLORIDE 25 MG/ML
25 INJECTION INTRAMUSCULAR; INTRAVENOUS; SUBCUTANEOUS
Status: CANCELLED
Start: 2024-08-01

## 2024-08-01 RX ORDER — DOXORUBICIN HYDROCHLORIDE 2 MG/ML
50 INJECTION, SOLUTION INTRAVENOUS ONCE
Status: COMPLETED | OUTPATIENT
Start: 2024-08-01 | End: 2024-08-01

## 2024-08-01 RX ORDER — HEPARIN SODIUM (PORCINE) LOCK FLUSH IV SOLN 100 UNIT/ML 100 UNIT/ML
5 SOLUTION INTRAVENOUS
Status: CANCELLED | OUTPATIENT
Start: 2024-08-01

## 2024-08-01 RX ORDER — PREDNISONE 50 MG/1
50 TABLET ORAL 2 TIMES DAILY
Qty: 10 TABLET | Refills: 0 | Status: SHIPPED | OUTPATIENT
Start: 2024-08-01 | End: 2024-08-06

## 2024-08-01 RX ADMIN — CYCLOPHOSPHAMIDE 1500 MG: 1 INJECTION, POWDER, FOR SOLUTION INTRAVENOUS; ORAL at 12:09

## 2024-08-01 RX ADMIN — PALONOSETRON HYDROCHLORIDE 0.25 MG: 0.25 INJECTION INTRAVENOUS at 11:21

## 2024-08-01 RX ADMIN — RITUXIMAB-ABBS 700 MG: 10 INJECTION, SOLUTION INTRAVENOUS at 13:01

## 2024-08-01 RX ADMIN — PEGFILGRASTIM 6 MG: KIT SUBCUTANEOUS at 14:18

## 2024-08-01 RX ADMIN — DOXORUBICIN HYDROCHLORIDE 100 MG: 2 INJECTION, SOLUTION INTRAVENOUS at 11:54

## 2024-08-01 RX ADMIN — SODIUM CHLORIDE 250 ML: 9 INJECTION, SOLUTION INTRAVENOUS at 11:21

## 2024-08-01 RX ADMIN — DIPHENHYDRAMINE HYDROCHLORIDE 50 MG: 25 CAPSULE ORAL at 12:10

## 2024-08-01 RX ADMIN — ACETAMINOPHEN 650 MG: 325 TABLET ORAL at 12:10

## 2024-08-01 RX ADMIN — FOSAPREPITANT 150 MG: 150 INJECTION, POWDER, LYOPHILIZED, FOR SOLUTION INTRAVENOUS at 11:25

## 2024-08-01 RX ADMIN — VINCRISTINE SULFATE 2 MG: 1 INJECTION, SOLUTION INTRAVENOUS at 12:01

## 2024-08-01 ASSESSMENT — PAIN SCALES - GENERAL: PAINLEVEL: NO PAIN (0)

## 2024-08-01 NOTE — NURSING NOTE
Chief Complaint   Patient presents with    Blood Draw     Labs drawn via PIV by RN in lab, vitals taken.      Labs drawn from PIV placed by RN. Line flushed with saline. Vitals taken. Pt checked in for appointment(s).     Coty Taylor RN

## 2024-08-01 NOTE — PROGRESS NOTES
Infusion Nursing Note:  Quincy Keane presents today for Cycle 3 Day 1 Adriamcyin, Vincristine, Cytoxan, (rapid) Rituximab-abbs and Neulasta On-Pro.    Patient seen by provider today: Yes: Dorie Zavala CNP   present during visit today: Not Applicable.    Note: Patient arrives feeling well. Denies any further concerns or questions after MARIBEL visit.      Intravenous Access:  Peripheral IV placed.    Treatment Conditions:  Lab Results   Component Value Date    HGB 13.0 (L) 08/01/2024    WBC 5.5 08/01/2024    ANEUTAUTO 4.3 08/01/2024     08/01/2024        Lab Results   Component Value Date     08/01/2024    POTASSIUM 4.4 08/01/2024    CR 0.68 08/01/2024    TERRA 9.1 08/01/2024    BILITOTAL 0.5 08/01/2024    ALBUMIN 4.1 08/01/2024    ALT 14 08/01/2024    AST 21 08/01/2024       Results reviewed, labs MET treatment parameters, ok to proceed with treatment.  ECHO/MUGA completed 6/10/24 LVEF 55%.      Post Infusion Assessment:  Patient tolerated infusion without incident.  Blood return noted pre and post infusion.  Blood return noted during Adriamycin administration every 2 cc.  Blood return noted before, during and post Vincristine gravity infusion.  Site patent and intact, free from redness, edema or discomfort.  No evidence of extravasations.  Access discontinued per protocol.     Neulasta Onpro On-Body injector applied to RUE at 1418.  Writer discussed Neulasta injection would start tomorrow 8/2 at ~1720, approximately 27 hours after application applied today.    Written and Verbal instruction reviewed with patient.  Pt instructed when the dose delivery starts, it will take about 45 minutes to complete.  Pt aware Neulasta Onpro On-Body should have green flashing light and to call triage or on-call MD if injector flashes red or appears to be leaking.   Pt aware to keep Onpro On-Body Neulasta 4 inches away from electrical equipment and to avoid showering 4 hours prior to injection.   Neulasta Onpro  Lot number: See MAR.        Discharge Plan:   Prescription refills given for Prednisone.  Discharge instructions reviewed with: Patient and daughter.  Patient and/or family verbalized understanding of discharge instructions and all questions answered.  AVS to patient via [facility].  Patient will return 8/22 for next appointment.   Patient discharged in stable condition accompanied by: daughter.  Departure Mode: Ambulatory.      Anjelica Siegel RN

## 2024-08-01 NOTE — LETTER
8/1/2024      Quincy Keane  [address]  [facility] [id number]      Dear Colleague,    Thank you for referring your patient, Quincy Keane, to the [facility]. Please see a copy of my visit note below.    [facility]  Date of visit: 08/01/2024      Reason for Visit: Follow- up for Follicular Lymphoma      Oncology HPI: This is a 64 year old male with PMH of atrial fibrillation, thyroid dysfunction and probable dermatitis, and newly diagnosed follicular lymphoma.  He was otherwise doing well until he developed a lump on his neck on the left side about in March, 2024. At the time he denied fevers, chills, night sweat or weight loss.  Initial needle biopsy showed atypical lymphoid infiltrate. Subsequent core needle biopsy showed follicular lymphoma grade 3B. IGH/BCL2 rearrangement was positive. No BCL6 or MYC rearrangement. 6/10/24 PET scan showed hypermetabolic left upper cervical lymph nodes and subcarinal lymph node. Overall stage II, non bulky disease. Started R-CHOP (C1D1=6/20/24).  7/29/24 PET s/p C2 demonstrated a CR as evidenced by resolution left cervical adenopathy (Deauville 2) and  mildly hypermetabolic subcarinal node.    Interval history:   Quincy presents to clinic for toxicity check prior to C3D1 R-CHOP and PET scan review.  He is overall doing well and feeling well.  He continues to remain busy with work and working when he gets home from work around his house.  His appetite is good and is eating and drinking well  Denies fevers/chills, night sweats, N/V/D/C, neuropathy, rashes/sores,  cough, chest pain, all other acute issues or concerns.    Current Outpatient Medications   Medication Sig Dispense Refill     acyclovir (ZOVIRAX) 400 MG tablet Take 1 tablet (400 mg) by mouth every 12 hours for 30 days 60 tablet 11     carvedilol (COREG) 3.125 MG tablet Take 1 tablet (3.125 mg) by mouth 2 times daily (with meals) for 30 days 60 tablet  11     rivaroxaban ANTICOAGULANT (XARELTO) 20 MG TABS tablet Take 1 tablet (20 mg) by mouth daily (with dinner) 30 tablet 4     prochlorperazine (COMPAZINE) 10 MG tablet Take 1 tablet (10 mg) by mouth every 6 hours as needed for nausea or vomiting (Patient not taking: Reported on 8/1/2024) 30 tablet 2       Allergies   Allergen Reactions     Penicillin V Unknown     Reaction Date: 08Sep2011; Annotation - 09Aug2013: unknown reaction, childhood reaction     Cats      Other Reaction(s): Rhinitis         Physical Exam:  /80 (BP Location: Right arm, Patient Position: Sitting, Cuff Size: Adult Regular)   Pulse 60   Temp 97.7  F (36.5  C) (Oral)   Wt 75.6 kg (166 lb 11.2 oz)   SpO2 100%   BMI 24.62 kg/m    Objective:  General: No acute distress, pleasant, well-groomed  HEENT: Sclera anicteric. Oral exam deferred  Heart: Regular rate, irregular rhythm  Lungs: Clear to ascultation bilaterally  Extremities: no lower extremity edema  Neuro: Cranial nerves grossly intact  Rash: none on exposed skin   Vascular access: PIV    Labs:   Most Recent 3 CBC's:  Recent Labs   Lab Test 08/01/24 1007 07/11/24  1000 06/20/24  0648   WBC 5.5 5.8 7.0   HGB 13.0* 14.0 15.5   MCV 91 90 88    305 215   ANEUTAUTO 4.3 4.3 4.3     Most Recent 3 BMP's:  Recent Labs   Lab Test 08/01/24 1007 07/11/24  1000 06/20/24  0648    138 140   POTASSIUM 4.4 4.4 4.0   CHLORIDE 105 103 103   CO2 29 28 29   BUN 24.3* 15.5 16.8   CR 0.68 0.74 0.80   ANIONGAP 7 7 8   TERRA 9.1 8.9 9.4   GLC 86 90 81   PROTTOTAL 6.6 6.8 7.2   ALBUMIN 4.1 4.2 4.3    Most Recent 3 LFT's:  Recent Labs   Lab Test 08/01/24  1007 07/11/24  1000 06/20/24  0648   AST 21 22 21   ALT 14 13 12   ALKPHOS 69 78 87   BILITOTAL 0.5 0.4 0.7    Most Recent 2 TSH and T4:  Recent Labs   Lab Test 07/11/24  1004 06/20/24  0648 05/14/24  1048   TSH 0.34 <0.01* <0.01*   T4  --  1.29 1.43     I reviewed the above labs today.    Imaging:  CT Soft Tissue Neck w Contrast  Narrative:  Combined Report of: PET and CT on 7/29/2024 10:06 AM:     1. PET of the neck, chest, abdomen, and pelvis.  2. PET CT Fusion for Attenuation Correction and Anatomical  Localization.  3. Diagnostic CT of the neck, chest, abdomen and pelvis with  intravenous contrast obtained for diagnostic interpretation.  4. 3D MIP and PET-CT fused images were processed on an independent  workstation and archived to PACS and reviewed by a radiologist.    Technique:    1. PET: The patient received 10.17 mCi of F-18-FDG. The serum glucose  was 99 mg/dL prior to administration. Body weight was 74.8 kg. Images  were evaluated in the axial, sagittal, and coronal planes as well as  the rotational whole body MIP. Images were acquired from cranial  vertex to feet.    UPTAKE WAS MEASURED AT 65 MINUTES.     2. CT: Volumetric acquisition for clinical interpretation of the  chest, abdomen, and pelvis acquired at 3 mm sections. The chest,  abdomen, and pelvis were evaluated at 5 mm sections in bone, soft  tissue, and lung windows. High resolution images of the neck were  obtained with multiple oblique projection reformats.    Contrast and Medications:  IV contrast: 100 mL of Isovue 370 intravenously.  PO contrast: None.  Additional Medications: None.    3. 3D MIP and PET-CT fused images were processed on an independent  workstation and archived to PACS and reviewed by a radiologist.    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    ADDITIONAL INFORMATION OBTAINED FROM EMR: Recent diagnosis, treatment  follow up after Rituximab / Doxorubicin / Vincristine /  Cyclophosphamide / Prednisone (cyc 2/6).    COMPARISON: PET/CT 6/10/2024.    FINDINGS:     BACKGROUND: Liver SUV max = 3.1, Aorta Blood SUV max = 2.6.     HEAD/NECK:    Lymph nodes: Resolution of prior FDG avid enlarged left level 2b lymph  node measuring 1.0 x 0.5 cm (3/133) with SUV max 1.3, previously 2.4 x  1.5 cm. With SUV max 22.6.    Pharyngeal mucosal spaces:  Diffusely decreased tonsillar uptake.  Nasopharynx and palatine tonsils are within normal limits. Tongue base  is normal. Oral tongue and buccal mucosa of the oral cavity is normal.  Oropharynx, hypopharynx and larynx are within normal limits.    Sinonasal region: Mild scattered mucosal thickening in the paranasal  sinuses. No mass within the nasal cavity.    Salivary glands: The major salivary glands are within normal limits.     Thyroid gland: Interval resolution of mild diffuse thyroid uptake.  Unchanged 0.2 cm hypoattenuating right thyroid nodule.    Vascular structures: The major vasculature of the neck are patent.    Brain: No abnormal FDG avid lesion or abnormal enhancement.     Orbital cavities: No abnormal FDG avid lesion or abnormal enhancement.    CHEST:    Lymph nodes: Resolution of prior FDG avid subcarinal lymph node  measuring 1.2 x 0.6 cm with SUV max 2.5, previously 1.8 x 0.9 cm with  SUV max 5.2. No new FDG avid or abnormally enlarged lymph nodes.    Lungs: Mild subpleural scarring in the lung apices. Scattered mucous  plugging. No acute consolidation.  No pleural effusion or  pneumothorax. Mild bilateral dependent atelectasis.    Heart and great vessels: Cardiomegaly with mildly decreased diffuse  atrial wall uptake. No pericardial effusion. Main pulmonary artery  measures up to 3.6 cm in diameter. The esophagus is unremarkable.     ABDOMEN AND PELVIS:    Liver: No FDG avid lesion. Similar hypoattenuating lesions and  geographic steatosis. No intrahepatic or extrahepatic biliary ductal  dilatation. Gallbladder is within normal limits.     Pancreas: The pancreas is within normal limits. No pancreatic ductal  dilatation.     Spleen: No FDG avid lesion.    Adrenal glands: No FDG avid foci.    Kidneys: No FDG avid lesion. No hydronephrosis. Bilateral renal cysts.  The urinary bladder is moderately distended.     Prostatomegaly with prostatic parenchymal calcifications. Pelvic  phleboliths. Mild  perianal uptake, which may represent inflamed  hemorrhoids.    Gastrointestinal system: Normal caliber of the small and large bowel.  Colonic diverticulosis    Lymph nodes: No FDG avid or abnormally enlarged lymph nodes.    Vascular structures: Normal caliber of the abdominal aorta.    Minimal peritoneal fluid. Tiny fat-containing umbilical hernia.    EXTREMITIES:     No abnormal FDG uptake in the visualized extremities.    BONES AND SOFT TISSUES:   Persistent mild uptake associated with similar appearing 0.5 cm oval  lytic lesion in posterior right 8th rib (3/275) with SUV max 4.6,  previously 3.9.    Surgical screws in medial right femoral condyle, possibly prior MCL  reconstruction. Degenerative changes in right knee with small joint  effusion and intra-articular calcification with continued mild  inflammatory uptake. Degenerative uptake of right hip with subchondral  cystic and sclerotic changes. Moderate scoliotic curvature of the  thoracolumbar spine, and multilevel degenerative changes in the spine.  Impression: IMPRESSION:     1. Complete response as evidenced by resolution of prior  hypermetabolic left cervical adenopathy (Deauville 2). Resolution of  prior mildly hypermetabolic subcarinal node, treatment response versus  resolved inflammation.    2. Similar appearing mildly hypermetabolic subcentimeter lytic lesion  in right posterior eighth rib, likely unrelated to lymphoma. Continue  attention on follow-up.    3. Resolved thyroiditis.    Lugano Criteria for Lymphoma response to therapy methodology  Reported as: ex.  Lugano Criteria: Complete Response    PET response  Used for FDG avid Lymphomas (Hodgkins & Diffuse Large B-Cell)  Complete                     <=Liver  (Deauville 1-3)  Partial                          > Liver & decreased from baseline   (Deauville 4-5)  Stable/No response     > Liver & no change from baseline  (Deauville  4-5)  Progressive                 > Liver & Increased or new FDG  avid  lesion (Deauville 4-5)    CT response  Used for variable uptake Lymphomas (Follicular, Marginal zone, CLL,  Mantle Cell)  Complete                     all nodes <1.5 cm  (longest diameter)  Partial                          Shrank > 50%        (SPD*)  Stable/No response      Shrank <50%         (SPD*)  Progressive disease      New or Increased size of lesions    *SPD = (sum of up to six lesions (longest x shortest diameter)    Source: Jacoby et al.  Imaging for Staging and Response Assessment in  Lymphoma.  Radiology August 2015.    _____________________________________________    The Deauville 5-point scoring system is an internationally accepted  and utilized five-point scoring system for the fluorodeoxyglucose  (FDG) avidity of a Hodgkin lymphoma or Non-Hodgkin lymphoma tumor mass  as seen on FDG positron emission tomography:[1]    Score 1: No uptake above the background  Score 2: Uptake ? mediastinum  Score 3: Uptake > mediastinum but ? liver  Score 4: Uptake moderately increased compared to the liver at any site  Score 5: Uptake markedly increased compared to the liver at any site  Score X: New areas of uptake unlikely to be related to lymphoma    I have personally reviewed the examination and initial interpretation  and I agree with the findings.    SHIV TA MD         SYSTEM ID:  [id number]  CT Chest/Abdomen/Pelvis w Contrast  Narrative: Combined Report of: PET and CT on 7/29/2024 10:06 AM:     1. PET of the neck, chest, abdomen, and pelvis.  2. PET CT Fusion for Attenuation Correction and Anatomical  Localization.  3. Diagnostic CT of the neck, chest, abdomen and pelvis with  intravenous contrast obtained for diagnostic interpretation.  4. 3D MIP and PET-CT fused images were processed on an independent  workstation and archived to PACS and reviewed by a radiologist.    Technique:    1. PET: The patient received 10.17 mCi of F-18-FDG. The serum glucose  was 99 mg/dL prior to administration. Body  weight was 74.8 kg. Images  were evaluated in the axial, sagittal, and coronal planes as well as  the rotational whole body MIP. Images were acquired from cranial  vertex to feet.    UPTAKE WAS MEASURED AT 65 MINUTES.     2. CT: Volumetric acquisition for clinical interpretation of the  chest, abdomen, and pelvis acquired at 3 mm sections. The chest,  abdomen, and pelvis were evaluated at 5 mm sections in bone, soft  tissue, and lung windows. High resolution images of the neck were  obtained with multiple oblique projection reformats.    Contrast and Medications:  IV contrast: 100 mL of Isovue 370 intravenously.  PO contrast: None.  Additional Medications: None.    3. 3D MIP and PET-CT fused images were processed on an independent  workstation and archived to PACS and reviewed by a radiologist.    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    ADDITIONAL INFORMATION OBTAINED FROM EMR: Recent diagnosis, treatment  follow up after Rituximab / Doxorubicin / Vincristine /  Cyclophosphamide / Prednisone (cyc 2/6).    COMPARISON: PET/CT 6/10/2024.    FINDINGS:     BACKGROUND: Liver SUV max = 3.1, Aorta Blood SUV max = 2.6.     HEAD/NECK:    Lymph nodes: Resolution of prior FDG avid enlarged left level 2b lymph  node measuring 1.0 x 0.5 cm (3/133) with SUV max 1.3, previously 2.4 x  1.5 cm. With SUV max 22.6.    Pharyngeal mucosal spaces: Diffusely decreased tonsillar uptake.  Nasopharynx and palatine tonsils are within normal limits. Tongue base  is normal. Oral tongue and buccal mucosa of the oral cavity is normal.  Oropharynx, hypopharynx and larynx are within normal limits.    Sinonasal region: Mild scattered mucosal thickening in the paranasal  sinuses. No mass within the nasal cavity.    Salivary glands: The major salivary glands are within normal limits.     Thyroid gland: Interval resolution of mild diffuse thyroid uptake.  Unchanged 0.2 cm hypoattenuating right thyroid nodule.    Vascular  structures: The major vasculature of the neck are patent.    Brain: No abnormal FDG avid lesion or abnormal enhancement.     Orbital cavities: No abnormal FDG avid lesion or abnormal enhancement.    CHEST:    Lymph nodes: Resolution of prior FDG avid subcarinal lymph node  measuring 1.2 x 0.6 cm with SUV max 2.5, previously 1.8 x 0.9 cm with  SUV max 5.2. No new FDG avid or abnormally enlarged lymph nodes.    Lungs: Mild subpleural scarring in the lung apices. Scattered mucous  plugging. No acute consolidation.  No pleural effusion or  pneumothorax. Mild bilateral dependent atelectasis.    Heart and great vessels: Cardiomegaly with mildly decreased diffuse  atrial wall uptake. No pericardial effusion. Main pulmonary artery  measures up to 3.6 cm in diameter. The esophagus is unremarkable.     ABDOMEN AND PELVIS:    Liver: No FDG avid lesion. Similar hypoattenuating lesions and  geographic steatosis. No intrahepatic or extrahepatic biliary ductal  dilatation. Gallbladder is within normal limits.     Pancreas: The pancreas is within normal limits. No pancreatic ductal  dilatation.     Spleen: No FDG avid lesion.    Adrenal glands: No FDG avid foci.    Kidneys: No FDG avid lesion. No hydronephrosis. Bilateral renal cysts.  The urinary bladder is moderately distended.     Prostatomegaly with prostatic parenchymal calcifications. Pelvic  phleboliths. Mild perianal uptake, which may represent inflamed  hemorrhoids.    Gastrointestinal system: Normal caliber of the small and large bowel.  Colonic diverticulosis    Lymph nodes: No FDG avid or abnormally enlarged lymph nodes.    Vascular structures: Normal caliber of the abdominal aorta.    Minimal peritoneal fluid. Tiny fat-containing umbilical hernia.    EXTREMITIES:     No abnormal FDG uptake in the visualized extremities.    BONES AND SOFT TISSUES:   Persistent mild uptake associated with similar appearing 0.5 cm oval  lytic lesion in posterior right 8th rib (3/275) with  SUV max 4.6,  previously 3.9.    Surgical screws in medial right femoral condyle, possibly prior MCL  reconstruction. Degenerative changes in right knee with small joint  effusion and intra-articular calcification with continued mild  inflammatory uptake. Degenerative uptake of right hip with subchondral  cystic and sclerotic changes. Moderate scoliotic curvature of the  thoracolumbar spine, and multilevel degenerative changes in the spine.  Impression: IMPRESSION:     1. Complete response as evidenced by resolution of prior  hypermetabolic left cervical adenopathy (Deauville 2). Resolution of  prior mildly hypermetabolic subcarinal node, treatment response versus  resolved inflammation.    2. Similar appearing mildly hypermetabolic subcentimeter lytic lesion  in right posterior eighth rib, likely unrelated to lymphoma. Continue  attention on follow-up.    3. Resolved thyroiditis.    Lugano Criteria for Lymphoma response to therapy methodology  Reported as: ex.  Lugano Criteria: Complete Response    PET response  Used for FDG avid Lymphomas (Hodgkins & Diffuse Large B-Cell)  Complete                     <=Liver  (Deauville 1-3)  Partial                          > Liver & decreased from baseline   (Deauville 4-5)  Stable/No response     > Liver & no change from baseline  (Deauville  4-5)  Progressive                 > Liver & Increased or new FDG avid  lesion (Deauville 4-5)    CT response  Used for variable uptake Lymphomas (Follicular, Marginal zone, CLL,  Mantle Cell)  Complete                     all nodes <1.5 cm  (longest diameter)  Partial                          Shrank > 50%        (SPD*)  Stable/No response      Shrank <50%         (SPD*)  Progressive disease      New or Increased size of lesions    *SPD = (sum of up to six lesions (longest x shortest diameter)    Source: Jacoby et al.  Imaging for Staging and Response Assessment in  Lymphoma.  Radiology August 2015.     _____________________________________________    The Deauville 5-point scoring system is an internationally accepted  and utilized five-point scoring system for the fluorodeoxyglucose  (FDG) avidity of a Hodgkin lymphoma or Non-Hodgkin lymphoma tumor mass  as seen on FDG positron emission tomography:[1]    Score 1: No uptake above the background  Score 2: Uptake ? mediastinum  Score 3: Uptake > mediastinum but ? liver  Score 4: Uptake moderately increased compared to the liver at any site  Score 5: Uptake markedly increased compared to the liver at any site  Score X: New areas of uptake unlikely to be related to lymphoma    I have personally reviewed the examination and initial interpretation  and I agree with the findings.    SHIV TA MD         SYSTEM ID:  [id number]  PET Oncology Whole Body  Narrative: Combined Report of: PET and CT on 7/29/2024 10:06 AM:     1. PET of the neck, chest, abdomen, and pelvis.  2. PET CT Fusion for Attenuation Correction and Anatomical  Localization.  3. Diagnostic CT of the neck, chest, abdomen and pelvis with  intravenous contrast obtained for diagnostic interpretation.  4. 3D MIP and PET-CT fused images were processed on an independent  workstation and archived to PACS and reviewed by a radiologist.    Technique:    1. PET: The patient received 10.17 mCi of F-18-FDG. The serum glucose  was 99 mg/dL prior to administration. Body weight was 74.8 kg. Images  were evaluated in the axial, sagittal, and coronal planes as well as  the rotational whole body MIP. Images were acquired from cranial  vertex to feet.    UPTAKE WAS MEASURED AT 65 MINUTES.     2. CT: Volumetric acquisition for clinical interpretation of the  chest, abdomen, and pelvis acquired at 3 mm sections. The chest,  abdomen, and pelvis were evaluated at 5 mm sections in bone, soft  tissue, and lung windows. High resolution images of the neck were  obtained with multiple oblique projection reformats.    Contrast and  Medications:  IV contrast: 100 mL of Isovue 370 intravenously.  PO contrast: None.  Additional Medications: None.    3. 3D MIP and PET-CT fused images were processed on an independent  workstation and archived to PACS and reviewed by a radiologist.    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    ADDITIONAL INFORMATION OBTAINED FROM EMR: Recent diagnosis, treatment  follow up after Rituximab / Doxorubicin / Vincristine /  Cyclophosphamide / Prednisone (cyc 2/6).    COMPARISON: PET/CT 6/10/2024.    FINDINGS:     BACKGROUND: Liver SUV max = 3.1, Aorta Blood SUV max = 2.6.     HEAD/NECK:    Lymph nodes: Resolution of prior FDG avid enlarged left level 2b lymph  node measuring 1.0 x 0.5 cm (3/133) with SUV max 1.3, previously 2.4 x  1.5 cm. With SUV max 22.6.    Pharyngeal mucosal spaces: Diffusely decreased tonsillar uptake.  Nasopharynx and palatine tonsils are within normal limits. Tongue base  is normal. Oral tongue and buccal mucosa of the oral cavity is normal.  Oropharynx, hypopharynx and larynx are within normal limits.    Sinonasal region: Mild scattered mucosal thickening in the paranasal  sinuses. No mass within the nasal cavity.    Salivary glands: The major salivary glands are within normal limits.     Thyroid gland: Interval resolution of mild diffuse thyroid uptake.  Unchanged 0.2 cm hypoattenuating right thyroid nodule.    Vascular structures: The major vasculature of the neck are patent.    Brain: No abnormal FDG avid lesion or abnormal enhancement.     Orbital cavities: No abnormal FDG avid lesion or abnormal enhancement.    CHEST:    Lymph nodes: Resolution of prior FDG avid subcarinal lymph node  measuring 1.2 x 0.6 cm with SUV max 2.5, previously 1.8 x 0.9 cm with  SUV max 5.2. No new FDG avid or abnormally enlarged lymph nodes.    Lungs: Mild subpleural scarring in the lung apices. Scattered mucous  plugging. No acute consolidation.  No pleural effusion or  pneumothorax.  Mild bilateral dependent atelectasis.    Heart and great vessels: Cardiomegaly with mildly decreased diffuse  atrial wall uptake. No pericardial effusion. Main pulmonary artery  measures up to 3.6 cm in diameter. The esophagus is unremarkable.     ABDOMEN AND PELVIS:    Liver: No FDG avid lesion. Similar hypoattenuating lesions and  geographic steatosis. No intrahepatic or extrahepatic biliary ductal  dilatation. Gallbladder is within normal limits.     Pancreas: The pancreas is within normal limits. No pancreatic ductal  dilatation.     Spleen: No FDG avid lesion.    Adrenal glands: No FDG avid foci.    Kidneys: No FDG avid lesion. No hydronephrosis. Bilateral renal cysts.  The urinary bladder is moderately distended.     Prostatomegaly with prostatic parenchymal calcifications. Pelvic  phleboliths. Mild perianal uptake, which may represent inflamed  hemorrhoids.    Gastrointestinal system: Normal caliber of the small and large bowel.  Colonic diverticulosis    Lymph nodes: No FDG avid or abnormally enlarged lymph nodes.    Vascular structures: Normal caliber of the abdominal aorta.    Minimal peritoneal fluid. Tiny fat-containing umbilical hernia.    EXTREMITIES:     No abnormal FDG uptake in the visualized extremities.    BONES AND SOFT TISSUES:   Persistent mild uptake associated with similar appearing 0.5 cm oval  lytic lesion in posterior right 8th rib (3/275) with SUV max 4.6,  previously 3.9.    Surgical screws in medial right femoral condyle, possibly prior MCL  reconstruction. Degenerative changes in right knee with small joint  effusion and intra-articular calcification with continued mild  inflammatory uptake. Degenerative uptake of right hip with subchondral  cystic and sclerotic changes. Moderate scoliotic curvature of the  thoracolumbar spine, and multilevel degenerative changes in the spine.  Impression: IMPRESSION:     1. Complete response as evidenced by resolution of prior  hypermetabolic left  cervical adenopathy (Deauville 2). Resolution of  prior mildly hypermetabolic subcarinal node, treatment response versus  resolved inflammation.    2. Similar appearing mildly hypermetabolic subcentimeter lytic lesion  in right posterior eighth rib, likely unrelated to lymphoma. Continue  attention on follow-up.    3. Resolved thyroiditis.    Lugano Criteria for Lymphoma response to therapy methodology  Reported as: ex.  Lugano Criteria: Complete Response    PET response  Used for FDG avid Lymphomas (Hodgkins & Diffuse Large B-Cell)  Complete                     <=Liver  (Deauville 1-3)  Partial                          > Liver & decreased from baseline   (Deauville 4-5)  Stable/No response     > Liver & no change from baseline  (Deauville  4-5)  Progressive                 > Liver & Increased or new FDG avid  lesion (Deauville 4-5)    CT response  Used for variable uptake Lymphomas (Follicular, Marginal zone, CLL,  Mantle Cell)  Complete                     all nodes <1.5 cm  (longest diameter)  Partial                          Shrank > 50%        (SPD*)  Stable/No response      Shrank <50%         (SPD*)  Progressive disease      New or Increased size of lesions    *SPD = (sum of up to six lesions (longest x shortest diameter)    Source: Jacoby et al.  Imaging for Staging and Response Assessment in  Lymphoma.  Radiology August 2015.    _____________________________________________    The Deauville 5-point scoring system is an internationally accepted  and utilized five-point scoring system for the fluorodeoxyglucose  (FDG) avidity of a Hodgkin lymphoma or Non-Hodgkin lymphoma tumor mass  as seen on FDG positron emission tomography:[1]    Score 1: No uptake above the background  Score 2: Uptake ? mediastinum  Score 3: Uptake > mediastinum but ? liver  Score 4: Uptake moderately increased compared to the liver at any site  Score 5: Uptake markedly increased compared to the liver at any site  Score X: New areas of  uptake unlikely to be related to lymphoma    I have personally reviewed the examination and initial interpretation  and I agree with the findings.    SHIV TA MD         SYSTEM ID:  [id number]    I reviewed the above imaging today.      Impression/plan:   High grade follicular lymphoma 3B:  - Previously reviewed natural history, prognosis, management of follicular lymphoma 3B with the patient. Treatment of FL 3B is in line with the DLBCL. Multi-agent chemotherapy with rituximab, cyclophosphamide, doxorubicin, vincristine and prednisone is the treatment of choice.  Previously discussed common toxicities include but are not limited to nausea, vomiting, diarrhea, rashes, and neuropathy.    - 6/20/24 C1D1 R-CHOP  - 7/29/24 PET demonstrated complete response! We discussed PET results today (8/1).  Long discussion today with patient and his family member today regarding continuing treatment.  Patient had lots of questions why we needed to continue to proceed with 6 cycles of R-CHOP when we had a CR after C2.  We dicussed he has high risk disease and best chance of remission and staying in remission is completing a total 6 cycles.  Discussed risk of relapse within 2 years of finishing treatment is ~20% but could be increased if he does not finish the full 6 cycles.  Discussed, the choice is his but medically we would not recommend stopping treatment early.  Patient agreed to proceed with treatment today and can discuss further prior to C4.  - S/p C2 and tolerating treatment well and not experiencing significant side-effects/toxicities.   - Proceed with C3D1 R-CHOP today (8/1)    Ppx:  - Acyclovir 400 mg BID    Atrial fibrillation:  - Atrial fibrillation is not an indication to not give anthracycline, but add carvedilol 12.5 BID for cardioprotection.   -  Consulted with cardiology 7/10/24  Xarelto 20 mg daily   Continue Carvedilol 12.5 mg BID  Recommend cardioversion- patient would like to avoid for now and can readdress  in the future  7-day zio patch- not  yet performed as of 8/1  Follow-up with cardiology in 3 months       42 minutes spent on the date of the encounter doing chart review, review of test results, interpretation of tests, patient visit, and documentation     The longitudinal plan of care for the diagnosis(es)/condition(s) as documented were addressed during this visit. Due to the added complexity in care, I will continue to support Quincy in the subsequent management and with ongoing continuity of care.     MI Lucas CNP  [facility] [address] [address]  [phone]        Again, thank you for allowing me to participate in the care of your patient.        Sincerely,        MI Lucas CNP

## 2024-08-01 NOTE — NURSING NOTE
"Oncology Rooming Note    August 1, 2024 10:30 AM   Quincy Keane is a 64 year old male who presents for:    Chief Complaint   Patient presents with    Blood Draw     Labs drawn via PIV by RN in lab, vitals taken.     Oncology Clinic Visit     Follicular Lymphoma     Initial Vitals: /80 (BP Location: Right arm, Patient Position: Sitting, Cuff Size: Adult Regular)   Pulse 60   Temp 97.7  F (36.5  C) (Oral)   Wt 75.6 kg (166 lb 11.2 oz)   SpO2 100%   BMI 24.62 kg/m   Estimated body mass index is 24.62 kg/m  as calculated from the following:    Height as of 7/24/24: 1.753 m (5' 9\").    Weight as of this encounter: 75.6 kg (166 lb 11.2 oz). Body surface area is 1.92 meters squared.  No Pain (0) Comment: Data Unavailable   No LMP for male patient.  Allergies reviewed: Yes  Medications reviewed: Yes    Medications: Medication refills not needed today.  Pharmacy name entered into [employer]:    [id number] [place], [place] - [address]. [facility] [place], [place] - [phone]    Frailty Screening:   Is the patient here for a new oncology consult visit in cancer care? 2. No      Clinical concerns: Patient states no new concerns to discuss with provider.  Dorie was NOT notified.      Johana Orozco EMT            "

## 2024-08-16 PROCEDURE — 93248 EXT ECG>7D<15D REV&INTERPJ: CPT | Performed by: INTERNAL MEDICINE

## 2024-08-19 ENCOUNTER — TELEPHONE (OUTPATIENT)
Dept: CARDIOLOGY | Facility: CLINIC | Age: 64
End: 2024-08-19
Payer: COMMERCIAL

## 2024-08-19 DIAGNOSIS — I48.91 NEW ONSET ATRIAL FIBRILLATION (H): Primary | ICD-10-CM

## 2024-08-19 RX ORDER — METOPROLOL TARTRATE 25 MG/1
25 TABLET, FILM COATED ORAL 2 TIMES DAILY
Qty: 180 TABLET | Refills: 3 | Status: SHIPPED | OUTPATIENT
Start: 2024-08-19

## 2024-08-19 NOTE — TELEPHONE ENCOUNTER
Froilan Carter MD Ashenmacher, Megan, RN; [facility] Heart Team 227 minutes ago (10:26 AM)       1.  Stop carvedilol 3.25 mg twice daily.  2.  Instead, start metoprolol tartrate 25 mg twice daily.  Indication is A-fib is poorly rate controlled on current carvedilol.  3.  Follow-up as scheduled.  4.  This patient does not like taking medications.  He will need to call them and discuss the above changes.    Thanks.  Dr. Carter       Spoke to patient, reviewed results and message from Dr Carter. All questions answered. He expressed understanding and was agreeable to plan.

## 2024-08-19 NOTE — TELEPHONE ENCOUNTER
7 day Zio patch monitor ordered at Cardiology clinic consult with Dr. Carter on 7/24/24 due to asymptomatic new onset atrial fibrillation, rate controlled on low-dose of carvedilol. Results routed to Dr. Carter for review. Patient is scheduled for follow up with Miri Zabala PA-C on 10/23/24.    RESULTS:   - Baseline persistent atrial fibrillation (100% burden)  - Heart rates ranging , average 98 bpm.  - 1 run of NSVT versus aberrant conduction lasting 23 beats.  - Patient triggered events correlated with atrial fibrillation with heart rates ranging  bpm.  - Isolated VEs were rare (<1.0%, 3449), VE Couplets were rare (<1.0%, 105), and VE Triplets were rare (<1.0%, 3).

## 2024-08-20 NOTE — PROGRESS NOTES
[facility]  Date of visit: 08/22/2024      Reason for Visit: Follow- up for Follicular Lymphoma      Oncology HPI: This is a 64 year old male with PMH of atrial fibrillation, thyroid dysfunction and probable dermatitis, and newly diagnosed follicular lymphoma.  He was otherwise doing well until he developed a lump on his neck on the left side about in March, 2024. At the time he denied fevers, chills, night sweat or weight loss.  Initial needle biopsy showed atypical lymphoid infiltrate. Subsequent core needle biopsy showed follicular lymphoma grade 3B. IGH/BCL2 rearrangement was positive. No BCL6 or MYC rearrangement. 6/10/24 PET scan showed hypermetabolic left upper cervical lymph nodes and subcarinal lymph node. Overall stage II, non bulky disease. Started R-CHOP (C1D1=6/20/24).  7/29/24 PET s/p C2 demonstrated a CR as evidenced by resolution left cervical adenopathy (Deauville 2) and  mildly hypermetabolic subcarinal node.    Interval history:   Quincy presents to clinic for toxicity check prior to C4D1 R-CHOP.  He is overall doing well and feeling well.  His appetite is good and is eating and drinking well  Denies fevers/chills, night sweats, N/V/D/C, neuropathy, cough, chest pain, all other acute issues or concerns.    Current Outpatient Medications   Medication Sig Dispense Refill    acyclovir (ZOVIRAX) 400 MG tablet Take 1 tablet (400 mg) by mouth every 12 hours for 30 days 60 tablet 11    metoprolol tartrate (LOPRESSOR) 25 MG tablet Take 1 tablet (25 mg) by mouth 2 times daily 180 tablet 3    rivaroxaban ANTICOAGULANT (XARELTO) 20 MG TABS tablet Take 1 tablet (20 mg) by mouth daily (with dinner) 30 tablet 4    prochlorperazine (COMPAZINE) 10 MG tablet Take 1 tablet (10 mg) by mouth every 6 hours as needed for nausea or vomiting (Patient not taking: Reported on 8/1/2024) 30 tablet 2       Allergies   Allergen Reactions    Penicillin V Unknown     Reaction Date: 08Sep2011;  Annotation - [id number]: unknown reaction, childhood reaction    Cats      Other Reaction(s): Rhinitis         Physical Exam:  /72   Pulse 52   Temp 97.3  F (36.3  C)   Resp 16   Wt 74.8 kg (165 lb)   SpO2 98%   BMI 24.37 kg/m    Objective:  General: No acute distress, pleasant, well-groomed  HEENT: Sclera anicteric. Oral exam deferred  Heart: Regular rate, irregular rhythm  Lungs: Clear to ascultation bilaterally  Extremities: no lower extremity edema  Neuro: Cranial nerves grossly intact  Rash: none on exposed skin   Vascular access: PIV    Labs:   Most Recent 3 CBC's:  Recent Labs   Lab Test 08/01/24  1007 07/11/24  1000 06/20/24  0648   WBC 5.5 5.8 7.0   HGB 13.0* 14.0 15.5   MCV 91 90 88    305 215   ANEUTAUTO 4.3 4.3 4.3     Most Recent 3 BMP's:  Recent Labs   Lab Test 08/22/24  0943 08/01/24  1007 07/11/24  1000    141 138   POTASSIUM 4.1 4.4 4.4   CHLORIDE 104 105 103   CO2 28 29 28   BUN 18.5 24.3* 15.5   CR 0.70 0.68 0.74   ANIONGAP 9 7 7   TERRA 9.0 9.1 8.9   GLC 75 86 90   PROTTOTAL 6.5 6.6 6.8   ALBUMIN 4.0 4.1 4.2    Most Recent 3 LFT's:  Recent Labs   Lab Test 08/22/24  0943 08/01/24  1007 07/11/24  1000   AST 23 21 22   ALT 14 14 13   ALKPHOS 67 69 78   BILITOTAL 0.5 0.5 0.4    Most Recent 2 TSH and T4:  Recent Labs   Lab Test 07/11/24  1004 06/20/24  0648 05/14/24  1048   TSH 0.34 <0.01* <0.01*   T4  --  1.29 1.43     I reviewed the above labs today.    Imaging:  ZIO PATCH MAIL OUT  Indication: Atrial Fibrillation  7 day Ziopatch monitor  Baseline persistent atrial fibrillation with heart rates ranging ,   average 98 bpm.  1 run of NSVT versus aberrant conduction lasting 23 beats.  Patient triggered events correlated with atrial fibrillation with heart   rates ranging  bpm.    I reviewed the above imaging today.      Impression/plan:   High grade follicular lymphoma 3B:  - Previously reviewed natural history, prognosis, management of follicular lymphoma 3B with the  patient. Treatment of FL 3B is in line with the DLBCL. Multi-agent chemotherapy with rituximab, cyclophosphamide, doxorubicin, vincristine and prednisone is the treatment of choice.  Previously discussed common toxicities include but are not limited to nausea, vomiting, diarrhea, rashes, and neuropathy.    - 6/20/24 C1D1 R-CHOP  - 7/29/24 PET demonstrated complete response! We discussed PET results 8/1 nad had long discussion with patient and his family member regarding continuing treatment.  Patient had lots of questions why we needed to continue to proceed with 6 cycles of R-CHOP when we had a CR after C2.  Previously dicussed he has high risk disease and best chance of remission and staying in remission is completing a total 6 cycles.  Discussed risk of relapse within 2 years of finishing treatment is ~20% but could be increased if he does not finish the full 6 cycles.  Discussed, the choice is his but medically we would not recommend stopping treatment early.  Re-discussed the above information again today (8/22) and after lengthy discussion with Quincy and his daughter he will proceed with C4 but this will be his last cycle.  He is aware of the increased risk of relapsed disease with stopping treatment early.  - S/p C3 and tolerating treatment well and not experiencing significant side-effects/toxicities.   - Proceed with C4D1 R-CHOP today (8/22)  - Will repeat PET 4-6 weeks after C4 and meet with Dr. Peñaloza to review imaging    Ppx:  - Acyclovir 400 mg BID    Atrial fibrillation:  - Atrial fibrillation is not an indication to not give anthracycline, but add carvedilol 12.5 BID for cardioprotection.   -  Consulted with cardiology 7/10/24  Xarelto 20 mg daily   Continue Carvedilol 12.5 mg BID  Recommend cardioversion- patient would like to avoid for now and can readdress in the future  7-day zio patch- completed 7/31  Follow-up with cardiology in 3 months     30 minutes spent on the date of the encounter doing  chart review, review of test results, interpretation of tests, patient visit, and documentation     The longitudinal plan of care for the diagnosis(es)/condition(s) as documented were addressed during this visit. Due to the added complexity in care, I will continue to support Quincy in the subsequent management and with ongoing continuity of care.     MI Lucas [facility] [address] [address]  [phone]

## 2024-08-22 ENCOUNTER — ONCOLOGY VISIT (OUTPATIENT)
Dept: ONCOLOGY | Facility: CLINIC | Age: 64
End: 2024-08-22
Attending: STUDENT IN AN ORGANIZED HEALTH CARE EDUCATION/TRAINING PROGRAM
Payer: COMMERCIAL

## 2024-08-22 ENCOUNTER — APPOINTMENT (OUTPATIENT)
Dept: LAB | Facility: CLINIC | Age: 64
End: 2024-08-22
Attending: STUDENT IN AN ORGANIZED HEALTH CARE EDUCATION/TRAINING PROGRAM
Payer: COMMERCIAL

## 2024-08-22 VITALS
HEART RATE: 52 BPM | RESPIRATION RATE: 16 BRPM | DIASTOLIC BLOOD PRESSURE: 72 MMHG | OXYGEN SATURATION: 98 % | SYSTOLIC BLOOD PRESSURE: 118 MMHG | BODY MASS INDEX: 24.37 KG/M2 | WEIGHT: 165 LBS | TEMPERATURE: 97.3 F

## 2024-08-22 DIAGNOSIS — E05.00 GRAVES DISEASE: ICD-10-CM

## 2024-08-22 DIAGNOSIS — I48.20 CHRONIC ATRIAL FIBRILLATION (H): ICD-10-CM

## 2024-08-22 DIAGNOSIS — C82.41 GRADE 3B FOLLICULAR LYMPHOMA OF LYMPH NODES OF NECK (H): Primary | ICD-10-CM

## 2024-08-22 DIAGNOSIS — Z51.11 ENCOUNTER FOR ANTINEOPLASTIC CHEMOTHERAPY: ICD-10-CM

## 2024-08-22 LAB
ALBUMIN SERPL BCG-MCNC: 4 G/DL (ref 3.5–5.2)
ALBUMIN SERPL BCG-MCNC: 4 G/DL (ref 3.5–5.2)
ALP SERPL-CCNC: 67 U/L (ref 40–150)
ALP SERPL-CCNC: 68 U/L (ref 40–150)
ALT SERPL W P-5'-P-CCNC: 13 U/L (ref 0–70)
ALT SERPL W P-5'-P-CCNC: 14 U/L (ref 0–70)
ANION GAP SERPL CALCULATED.3IONS-SCNC: 9 MMOL/L (ref 7–15)
ANION GAP SERPL CALCULATED.3IONS-SCNC: 9 MMOL/L (ref 7–15)
AST SERPL W P-5'-P-CCNC: 23 U/L (ref 0–45)
AST SERPL W P-5'-P-CCNC: 23 U/L (ref 0–45)
BASOPHILS # BLD AUTO: 0.1 10E3/UL (ref 0–0.2)
BASOPHILS NFR BLD AUTO: 1 %
BILIRUB SERPL-MCNC: 0.4 MG/DL
BILIRUB SERPL-MCNC: 0.5 MG/DL
BUN SERPL-MCNC: 18.2 MG/DL (ref 8–23)
BUN SERPL-MCNC: 18.5 MG/DL (ref 8–23)
CALCIUM SERPL-MCNC: 9 MG/DL (ref 8.8–10.4)
CALCIUM SERPL-MCNC: 9 MG/DL (ref 8.8–10.4)
CHLORIDE SERPL-SCNC: 104 MMOL/L (ref 98–107)
CHLORIDE SERPL-SCNC: 104 MMOL/L (ref 98–107)
CREAT SERPL-MCNC: 0.69 MG/DL (ref 0.67–1.17)
CREAT SERPL-MCNC: 0.7 MG/DL (ref 0.67–1.17)
EGFRCR SERPLBLD CKD-EPI 2021: >90 ML/MIN/1.73M2
EGFRCR SERPLBLD CKD-EPI 2021: >90 ML/MIN/1.73M2
EOSINOPHIL # BLD AUTO: 0 10E3/UL (ref 0–0.7)
EOSINOPHIL NFR BLD AUTO: 1 %
ERYTHROCYTE [DISTWIDTH] IN BLOOD BY AUTOMATED COUNT: 17.4 % (ref 10–15)
GLUCOSE SERPL-MCNC: 75 MG/DL (ref 70–99)
GLUCOSE SERPL-MCNC: 77 MG/DL (ref 70–99)
HCO3 SERPL-SCNC: 27 MMOL/L (ref 22–29)
HCO3 SERPL-SCNC: 28 MMOL/L (ref 22–29)
HCT VFR BLD AUTO: 32.8 % (ref 40–53)
HGB BLD-MCNC: 11.3 G/DL (ref 13.3–17.7)
IMM GRANULOCYTES # BLD: 0 10E3/UL
IMM GRANULOCYTES NFR BLD: 0 %
LDH SERPL L TO P-CCNC: 224 U/L (ref 0–250)
LYMPHOCYTES # BLD AUTO: 0.4 10E3/UL (ref 0.8–5.3)
LYMPHOCYTES NFR BLD AUTO: 8 %
MCH RBC QN AUTO: 32.2 PG (ref 26.5–33)
MCHC RBC AUTO-ENTMCNC: 34.5 G/DL (ref 31.5–36.5)
MCV RBC AUTO: 93 FL (ref 78–100)
MONOCYTES # BLD AUTO: 0.6 10E3/UL (ref 0–1.3)
MONOCYTES NFR BLD AUTO: 12 %
NEUTROPHILS # BLD AUTO: 3.9 10E3/UL (ref 1.6–8.3)
NEUTROPHILS NFR BLD AUTO: 78 %
NRBC # BLD AUTO: 0 10E3/UL
NRBC BLD AUTO-RTO: 0 /100
PLATELET # BLD AUTO: 309 10E3/UL (ref 150–450)
POTASSIUM SERPL-SCNC: 4.1 MMOL/L (ref 3.4–5.3)
POTASSIUM SERPL-SCNC: 4.2 MMOL/L (ref 3.4–5.3)
PROT SERPL-MCNC: 6.5 G/DL (ref 6.4–8.3)
PROT SERPL-MCNC: 6.6 G/DL (ref 6.4–8.3)
RBC # BLD AUTO: 3.51 10E6/UL (ref 4.4–5.9)
SODIUM SERPL-SCNC: 140 MMOL/L (ref 135–145)
SODIUM SERPL-SCNC: 141 MMOL/L (ref 135–145)
TSH SERPL DL<=0.005 MIU/L-ACNC: 0.81 UIU/ML (ref 0.3–4.2)
URATE SERPL-MCNC: 4.7 MG/DL (ref 3.4–7)
WBC # BLD AUTO: 5 10E3/UL (ref 4–11)

## 2024-08-22 PROCEDURE — 250N000011 HC RX IP 250 OP 636

## 2024-08-22 PROCEDURE — 250N000013 HC RX MED GY IP 250 OP 250 PS 637

## 2024-08-22 PROCEDURE — 96415 CHEMO IV INFUSION ADDL HR: CPT

## 2024-08-22 PROCEDURE — 84443 ASSAY THYROID STIM HORMONE: CPT

## 2024-08-22 PROCEDURE — 258N000003 HC RX IP 258 OP 636

## 2024-08-22 PROCEDURE — 99215 OFFICE O/P EST HI 40 MIN: CPT

## 2024-08-22 PROCEDURE — 96367 TX/PROPH/DG ADDL SEQ IV INF: CPT

## 2024-08-22 PROCEDURE — 82374 ASSAY BLOOD CARBON DIOXIDE: CPT

## 2024-08-22 PROCEDURE — 83615 LACTATE (LD) (LDH) ENZYME: CPT

## 2024-08-22 PROCEDURE — 85025 COMPLETE CBC W/AUTO DIFF WBC: CPT

## 2024-08-22 PROCEDURE — 36415 COLL VENOUS BLD VENIPUNCTURE: CPT

## 2024-08-22 PROCEDURE — 96411 CHEMO IV PUSH ADDL DRUG: CPT

## 2024-08-22 PROCEDURE — 99213 OFFICE O/P EST LOW 20 MIN: CPT

## 2024-08-22 PROCEDURE — 96417 CHEMO IV INFUS EACH ADDL SEQ: CPT

## 2024-08-22 PROCEDURE — 96375 TX/PRO/DX INJ NEW DRUG ADDON: CPT

## 2024-08-22 PROCEDURE — 82247 BILIRUBIN TOTAL: CPT

## 2024-08-22 PROCEDURE — 96413 CHEMO IV INFUSION 1 HR: CPT

## 2024-08-22 PROCEDURE — G2211 COMPLEX E/M VISIT ADD ON: HCPCS

## 2024-08-22 PROCEDURE — 84550 ASSAY OF BLOOD/URIC ACID: CPT

## 2024-08-22 RX ORDER — ACETAMINOPHEN 325 MG/1
650 TABLET ORAL ONCE
Status: CANCELLED
Start: 2024-08-22

## 2024-08-22 RX ORDER — DIPHENHYDRAMINE HCL 25 MG
50 CAPSULE ORAL ONCE
Status: COMPLETED | OUTPATIENT
Start: 2024-08-22 | End: 2024-08-22

## 2024-08-22 RX ORDER — HEPARIN SODIUM,PORCINE 10 UNIT/ML
5-20 VIAL (ML) INTRAVENOUS DAILY PRN
Status: CANCELLED | OUTPATIENT
Start: 2024-08-22

## 2024-08-22 RX ORDER — DOXORUBICIN HYDROCHLORIDE 2 MG/ML
50 INJECTION, SOLUTION INTRAVENOUS ONCE
Status: CANCELLED | OUTPATIENT
Start: 2024-08-22

## 2024-08-22 RX ORDER — METHYLPREDNISOLONE SODIUM SUCCINATE 125 MG/2ML
125 INJECTION, POWDER, LYOPHILIZED, FOR SOLUTION INTRAMUSCULAR; INTRAVENOUS
Status: CANCELLED
Start: 2024-08-22

## 2024-08-22 RX ORDER — MEPERIDINE HYDROCHLORIDE 25 MG/ML
25 INJECTION INTRAMUSCULAR; INTRAVENOUS; SUBCUTANEOUS
Status: CANCELLED
Start: 2024-08-22

## 2024-08-22 RX ORDER — ALBUTEROL SULFATE 90 UG/1
1-2 AEROSOL, METERED RESPIRATORY (INHALATION)
Status: CANCELLED
Start: 2024-08-22

## 2024-08-22 RX ORDER — DOXORUBICIN HYDROCHLORIDE 2 MG/ML
50 INJECTION, SOLUTION INTRAVENOUS ONCE
Status: COMPLETED | OUTPATIENT
Start: 2024-08-22 | End: 2024-08-22

## 2024-08-22 RX ORDER — DIPHENHYDRAMINE HYDROCHLORIDE 50 MG/ML
50 INJECTION INTRAMUSCULAR; INTRAVENOUS
Status: CANCELLED
Start: 2024-08-22

## 2024-08-22 RX ORDER — LORAZEPAM 2 MG/ML
0.5 INJECTION INTRAMUSCULAR EVERY 4 HOURS PRN
Status: CANCELLED | OUTPATIENT
Start: 2024-08-22

## 2024-08-22 RX ORDER — PALONOSETRON 0.05 MG/ML
0.25 INJECTION, SOLUTION INTRAVENOUS ONCE
Status: CANCELLED
Start: 2024-08-22

## 2024-08-22 RX ORDER — HEPARIN SODIUM (PORCINE) LOCK FLUSH IV SOLN 100 UNIT/ML 100 UNIT/ML
5 SOLUTION INTRAVENOUS
Status: CANCELLED | OUTPATIENT
Start: 2024-08-22

## 2024-08-22 RX ORDER — PREDNISONE 50 MG/1
50 TABLET ORAL 2 TIMES DAILY
Qty: 10 TABLET | Refills: 0 | Status: SHIPPED | OUTPATIENT
Start: 2024-08-22 | End: 2024-08-27

## 2024-08-22 RX ORDER — ALBUTEROL SULFATE 0.83 MG/ML
2.5 SOLUTION RESPIRATORY (INHALATION)
Status: CANCELLED | OUTPATIENT
Start: 2024-08-22

## 2024-08-22 RX ORDER — EPINEPHRINE 1 MG/ML
0.3 INJECTION, SOLUTION INTRAMUSCULAR; SUBCUTANEOUS EVERY 5 MIN PRN
Status: CANCELLED | OUTPATIENT
Start: 2024-08-22

## 2024-08-22 RX ORDER — MEPERIDINE HYDROCHLORIDE 25 MG/ML
25 INJECTION INTRAMUSCULAR; INTRAVENOUS; SUBCUTANEOUS EVERY 30 MIN PRN
Status: CANCELLED | OUTPATIENT
Start: 2024-08-22

## 2024-08-22 RX ORDER — PALONOSETRON 0.05 MG/ML
0.25 INJECTION, SOLUTION INTRAVENOUS ONCE
Status: COMPLETED | OUTPATIENT
Start: 2024-08-22 | End: 2024-08-22

## 2024-08-22 RX ORDER — ACETAMINOPHEN 325 MG/1
650 TABLET ORAL ONCE
Status: COMPLETED | OUTPATIENT
Start: 2024-08-22 | End: 2024-08-22

## 2024-08-22 RX ORDER — DIPHENHYDRAMINE HCL 25 MG
50 CAPSULE ORAL ONCE
Status: CANCELLED
Start: 2024-08-22

## 2024-08-22 RX ADMIN — CYCLOPHOSPHAMIDE 1500 MG: 1 INJECTION, POWDER, FOR SOLUTION INTRAVENOUS; ORAL at 13:11

## 2024-08-22 RX ADMIN — VINCRISTINE SULFATE 2 MG: 1 INJECTION, SOLUTION INTRAVENOUS at 13:04

## 2024-08-22 RX ADMIN — DOXORUBICIN HYDROCHLORIDE 100 MG: 2 INJECTION, SOLUTION INTRAVENOUS at 12:14

## 2024-08-22 RX ADMIN — DIPHENHYDRAMINE HYDROCHLORIDE 50 MG: 25 CAPSULE ORAL at 13:09

## 2024-08-22 RX ADMIN — ACETAMINOPHEN 650 MG: 325 TABLET ORAL at 13:09

## 2024-08-22 RX ADMIN — PALONOSETRON HYDROCHLORIDE 0.25 MG: 0.25 INJECTION INTRAVENOUS at 11:38

## 2024-08-22 RX ADMIN — FOSAPREPITANT 150 MG: 150 INJECTION, POWDER, LYOPHILIZED, FOR SOLUTION INTRAVENOUS at 11:39

## 2024-08-22 RX ADMIN — RITUXIMAB-ABBS 700 MG: 10 INJECTION, SOLUTION INTRAVENOUS at 14:15

## 2024-08-22 RX ADMIN — SODIUM CHLORIDE 250 ML: 9 INJECTION, SOLUTION INTRAVENOUS at 11:37

## 2024-08-22 ASSESSMENT — PAIN SCALES - GENERAL: PAINLEVEL: NO PAIN (0)

## 2024-08-22 NOTE — PROGRESS NOTES
Infusion Nursing Note:  Quincy Keane presents today for Cycle 4 Day 1 Adriamycin/Vincristine/Cytoxan/Rituximab.    Patient seen by provider today: Yes: Dorie Zavala CNP   present during visit today: Not Applicable.    Note: Pt receiving last cycle of RCHOP today (stopping early - see MARIBEL note for details). Denies any further concerns or questions after MARIBEL visit.    Per written communication Dorie Zavala CNP/Anjelica Robbin, @9246 8/22/24:  - No Neulasta On-Pro - we usually only do it unless >65 or had neutropenic fevers between cycles       Intravenous Access:  Peripheral IV placed.    Treatment Conditions:  Lab Results   Component Value Date    HGB 11.3 (L) 08/22/2024    WBC 5.0 08/22/2024    ANEUTAUTO 3.9 08/22/2024     08/22/2024        Lab Results   Component Value Date     08/22/2024     08/22/2024    POTASSIUM 4.1 08/22/2024    POTASSIUM 4.2 08/22/2024    CR 0.70 08/22/2024    CR 0.69 08/22/2024    TERRA 9.0 08/22/2024    TERRA 9.0 08/22/2024    BILITOTAL 0.5 08/22/2024    BILITOTAL 0.4 08/22/2024    ALBUMIN 4.0 08/22/2024    ALBUMIN 4.0 08/22/2024    ALT 14 08/22/2024    ALT 13 08/22/2024    AST 23 08/22/2024    AST 23 08/22/2024       Results reviewed, labs MET treatment parameters, ok to proceed with treatment.  ECHO/MUGA completed 6/10/24  EF 55%.      Post Infusion Assessment:  Patient tolerated infusion without incident.  Blood return noted pre and post infusion.  Blood return noted during Adriamycin administration every 2-3 cc.  Blood return noted pre, during and post Vincristine gravity infusion.  Site patent and intact, free from redness, edema or discomfort.  No evidence of extravasations.  Access discontinued per protocol.       Discharge Plan:   Prescription refills given for Prednisone.  Discharge instructions reviewed with: Patient and family member.  Patient and/or family verbalized understanding of discharge instructions and all questions answered.  AVS to  patient via [employer].  Patient will return 4-6 weeks for PET scan/follow-up with Dr. Peñaloza.  Patient discharged in stable condition accompanied by: family member.  Departure Mode: Ambulatory.      Anjelica Siegel RN

## 2024-08-22 NOTE — LETTER
8/22/2024      Quincy Keane  [address]  [facility] [address]      Dear Colleague,    Thank you for referring your patient, Quincy Keane, to the [facility]. Please see a copy of my visit note below.    [facility]  Date of visit: 08/22/2024      Reason for Visit: Follow- up for Follicular Lymphoma      Oncology HPI: This is a 64 year old male with PMH of atrial fibrillation, thyroid dysfunction and probable dermatitis, and newly diagnosed follicular lymphoma.  He was otherwise doing well until he developed a lump on his neck on the left side about in March, 2024. At the time he denied fevers, chills, night sweat or weight loss.  Initial needle biopsy showed atypical lymphoid infiltrate. Subsequent core needle biopsy showed follicular lymphoma grade 3B. IGH/BCL2 rearrangement was positive. No BCL6 or MYC rearrangement. 6/10/24 PET scan showed hypermetabolic left upper cervical lymph nodes and subcarinal lymph node. Overall stage II, non bulky disease. Started R-CHOP (C1D1=6/20/24).  7/29/24 PET s/p C2 demonstrated a CR as evidenced by resolution left cervical adenopathy (Deauville 2) and  mildly hypermetabolic subcarinal node.    Interval history:   Quincy presents to clinic for toxicity check prior to C4D1 R-CHOP.  He is overall doing well and feeling well.  His appetite is good and is eating and drinking well  Denies fevers/chills, night sweats, N/V/D/C, neuropathy, cough, chest pain, all other acute issues or concerns.    Current Outpatient Medications   Medication Sig Dispense Refill     acyclovir (ZOVIRAX) 400 MG tablet Take 1 tablet (400 mg) by mouth every 12 hours for 30 days 60 tablet 11     metoprolol tartrate (LOPRESSOR) 25 MG tablet Take 1 tablet (25 mg) by mouth 2 times daily 180 tablet 3     rivaroxaban ANTICOAGULANT (XARELTO) 20 MG TABS tablet Take 1 tablet (20 mg) by mouth daily (with dinner) 30 tablet 4     prochlorperazine  (COMPAZINE) 10 MG tablet Take 1 tablet (10 mg) by mouth every 6 hours as needed for nausea or vomiting (Patient not taking: Reported on 8/1/2024) 30 tablet 2       Allergies   Allergen Reactions     Penicillin V Unknown     Reaction Date: 08Sep2011; Annotation - [id number]: unknown reaction, childhood reaction     Cats      Other Reaction(s): Rhinitis         Physical Exam:  /72   Pulse 52   Temp 97.3  F (36.3  C)   Resp 16   Wt 74.8 kg (165 lb)   SpO2 98%   BMI 24.37 kg/m    Objective:  General: No acute distress, pleasant, well-groomed  HEENT: Sclera anicteric. Oral exam deferred  Heart: Regular rate, irregular rhythm  Lungs: Clear to ascultation bilaterally  Extremities: no lower extremity edema  Neuro: Cranial nerves grossly intact  Rash: none on exposed skin   Vascular access: PIV    Labs:   Most Recent 3 CBC's:  Recent Labs   Lab Test 08/01/24  1007 07/11/24  1000 06/20/24  0648   WBC 5.5 5.8 7.0   HGB 13.0* 14.0 15.5   MCV 91 90 88    305 215   ANEUTAUTO 4.3 4.3 4.3     Most Recent 3 BMP's:  Recent Labs   Lab Test 08/22/24  0943 08/01/24  1007 07/11/24  1000    141 138   POTASSIUM 4.1 4.4 4.4   CHLORIDE 104 105 103   CO2 28 29 28   BUN 18.5 24.3* 15.5   CR 0.70 0.68 0.74   ANIONGAP 9 7 7   TERRA 9.0 9.1 8.9   GLC 75 86 90   PROTTOTAL 6.5 6.6 6.8   ALBUMIN 4.0 4.1 4.2    Most Recent 3 LFT's:  Recent Labs   Lab Test 08/22/24  0943 08/01/24  1007 07/11/24  1000   AST 23 21 22   ALT 14 14 13   ALKPHOS 67 69 78   BILITOTAL 0.5 0.5 0.4    Most Recent 2 TSH and T4:  Recent Labs   Lab Test 07/11/24  1004 06/20/24  0648 05/14/24  1048   TSH 0.34 <0.01* <0.01*   T4  --  1.29 1.43     I reviewed the above labs today.    Imaging:  ZIO PATCH MAIL OUT  Indication: Atrial Fibrillation  7 day Ziopatch monitor  Baseline persistent atrial fibrillation with heart rates ranging ,   average 98 bpm.  1 run of NSVT versus aberrant conduction lasting 23 beats.  Patient triggered events correlated with  atrial fibrillation with heart   rates ranging  bpm.    I reviewed the above imaging today.      Impression/plan:   High grade follicular lymphoma 3B:  - Previously reviewed natural history, prognosis, management of follicular lymphoma 3B with the patient. Treatment of FL 3B is in line with the DLBCL. Multi-agent chemotherapy with rituximab, cyclophosphamide, doxorubicin, vincristine and prednisone is the treatment of choice.  Previously discussed common toxicities include but are not limited to nausea, vomiting, diarrhea, rashes, and neuropathy.    - 6/20/24 C1D1 R-CHOP  - 7/29/24 PET demonstrated complete response! We discussed PET results 8/1 nad had long discussion with patient and his family member regarding continuing treatment.  Patient had lots of questions why we needed to continue to proceed with 6 cycles of R-CHOP when we had a CR after C2.  Previously dicussed he has high risk disease and best chance of remission and staying in remission is completing a total 6 cycles.  Discussed risk of relapse within 2 years of finishing treatment is ~20% but could be increased if he does not finish the full 6 cycles.  Discussed, the choice is his but medically we would not recommend stopping treatment early.  Re-discussed the above information again today (8/22) and after lengthy discussion with Quincy and his daughter he will proceed with C4 but this will be his last cycle.  He is aware of the increased risk of relapsed disease with stopping treatment early.  - S/p C3 and tolerating treatment well and not experiencing significant side-effects/toxicities.   - Proceed with C4D1 R-CHOP today (8/22)  - Will repeat PET 4-6 weeks after C4 and meet with Dr. Peñaloza to review imaging    Ppx:  - Acyclovir 400 mg BID    Atrial fibrillation:  - Atrial fibrillation is not an indication to not give anthracycline, but add carvedilol 12.5 BID for cardioprotection.   -  Consulted with cardiology 7/10/24  Xarelto 20 mg daily    Continue Carvedilol 12.5 mg BID  Recommend cardioversion- patient would like to avoid for now and can readdress in the future  7-day zio patch- completed 7/31  Follow-up with cardiology in 3 months     30 minutes spent on the date of the encounter doing chart review, review of test results, interpretation of tests, patient visit, and documentation     The longitudinal plan of care for the diagnosis(es)/condition(s) as documented were addressed during this visit. Due to the added complexity in care, I will continue to support Quincy in the subsequent management and with ongoing continuity of care.     MI Lucas CNP  [facility] [address] [address]  [phone]        Again, thank you for allowing me to participate in the care of your patient.        Sincerely,        MI Lucas CNP

## 2024-08-22 NOTE — NURSING NOTE
Chief Complaint   Patient presents with    Oncology Clinic Visit     Follicular lymphoma    Blood Draw     Labs drawn from PIV placed by RN. Line flushed with saline. Vitals taken. Pt checked in for appointment(s).      Labs drawn from PIV placed by RN. Line flushed with saline. Vitals taken. Pt checked in for appointment(s).     Catia Santana RN

## 2024-08-22 NOTE — NURSING NOTE
"Oncology Rooming Note    August 22, 2024 10:05 AM   Quincy Keane is a 64 year old male who presents for:    Chief Complaint   Patient presents with    Oncology Clinic Visit     Follicular lymphoma    Blood Draw     Labs drawn from PIV placed by RN. Line flushed with saline. Vitals taken. Pt checked in for appointment(s).      Initial Vitals: /72   Pulse 52   Temp 97.3  F (36.3  C)   Resp 16   Wt 74.8 kg (165 lb)   SpO2 98%   BMI 24.37 kg/m   Estimated body mass index is 24.37 kg/m  as calculated from the following:    Height as of 7/24/24: 1.753 m (5' 9\").    Weight as of this encounter: 74.8 kg (165 lb). Body surface area is 1.91 meters squared.  No Pain (0) Comment: Data Unavailable   No LMP for male patient.  Allergies reviewed: Yes  Medications reviewed: Yes    Medications: Medication refills not needed today.  Pharmacy name entered into [employer]:    [id number] [place], [place] - [address] [place] [address]. [facility] [place], [place] - [address] [facility] [phone]    Frailty Screening:   Is the patient here for a new oncology consult visit in cancer care? 2. No      Clinical concerns: Patient states no new concerns to discuss with provider.  Dorie was NOT notified.      Johana Orozco EMT            "

## 2024-08-25 ENCOUNTER — HEALTH MAINTENANCE LETTER (OUTPATIENT)
Age: 64
End: 2024-08-25

## 2024-09-13 NOTE — RESULT ENCOUNTER NOTE
Hello -    Here are my comments about the recent results: thyroid function test is normal, if no changes in symptoms, plan follow up labs 2-3 months.    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 Werner for radiation to the Left Lung.   9/11/24: OTV 1800cGy/6600cGy, 9/33fx to the Left lung. Mr. Castrejon reports feeling well. He denies fatigue. Appetite is good. Breathing even and unlabored. He denies CP, SOB, BRYANT, hemoptysis, or cough. Plan to continue radiation.   9/4/24: OTV 800cGy/6600cGy, 4/33fx to the Left lung. Today patient is feeling well, denies fatigue or changes in appetite. No SOB with exertion or at rest. Able to tolerate ADLs independetntly. Weight is stable. Patient reports some aching pain to right side unresolved with Aleeve taken at night but he believes the pain is due to the cold. Negative for cough, phlegm, hemoptysis. Negative for acid reflux, n/v. Denies pain or skin irritation at therapy site. Patient is on concurrent chemotherapy. Reports constipation x4 days; Senna HS ordered. He is staying with his son in [place], [place] during his treatments. Continue radiation therapy.  8/30/24: OTV  400cGy/6600cGy, 2/33fx to the Left lung. Patient reports he is feeling well. Denies fatigue. Appetite is good. He denies CP/SOB, cough, or hemoptysis. Plan to continue radiation.    8/8/24: Consultation while inpatient   History of Present Illness  _________________________________  Don Castrejon is a 71 yo M w/ pituitary adenoma being observed as well as cT4N3 NSCLC (squamous cell carcinoma) of the LEFT lung and mediastinum s/p induction systemic therapy   (gem/nivo x3, cis/gem/nivo x1) with progression of disease.     Patient initially presented with lung mass and underwent staging imaging suggestive of xT2I8I3 disease. Biopsy showed NSCLC, NGS negative.   The case was discussed at multidisciplinary tumor board and consensus was to proceed w/ neoadjuvant chemo immunotherapy and subsequent resection.    Patient started treatment w/  on 4/23/24  He was unable to tolerate cisplatin due to renal concerns and was switched to  carboplatin.    PET/CT scan (7/8/24) revealing disease progression with ?N3 disease noted at station R4.  MRI brain wnl.    Patient is now s/p bronchoscopy w/ mediastinoscopy which was aborted due to intraop bleeding (8/5/24) with .    EBUS was obtained, and demonstrated positive sample. On 8/8/24, patient obtained CT Chest w/ IV contrast revealing slight enlargement of neoplasm mass and lymph nodes along w/ gas in mediastinal area due to recent mediastinoscopy. Radiation oncology was consulted to evaluate patient prior to discharge.

## 2024-09-21 DIAGNOSIS — R94.6 ABNORMAL THYROID FUNCTION TEST: Primary | ICD-10-CM

## 2024-09-30 ENCOUNTER — HOSPITAL ENCOUNTER (OUTPATIENT)
Dept: PET IMAGING | Facility: CLINIC | Age: 64
Discharge: HOME | End: 2024-09-30
Payer: COMMERCIAL

## 2024-09-30 DIAGNOSIS — C82.41 GRADE 3B FOLLICULAR LYMPHOMA OF LYMPH NODES OF NECK (H): ICD-10-CM

## 2024-09-30 PROCEDURE — 78816 PET IMAGE W/CT FULL BODY: CPT | Mod: 26 | Performed by: RADIOLOGY

## 2024-09-30 PROCEDURE — 74177 CT ABD & PELVIS W/CONTRAST: CPT | Mod: 26 | Performed by: RADIOLOGY

## 2024-09-30 PROCEDURE — A9552 F18 FDG: HCPCS

## 2024-09-30 PROCEDURE — 78816 PET IMAGE W/CT FULL BODY: CPT | Mod: PS

## 2024-09-30 PROCEDURE — 343N000001 HC RX 343

## 2024-09-30 PROCEDURE — 343N000001 HC RX 343 MED OP 636

## 2024-09-30 PROCEDURE — 74177 CT ABD & PELVIS W/CONTRAST: CPT

## 2024-09-30 PROCEDURE — 70491 CT SOFT TISSUE NECK W/DYE: CPT

## 2024-09-30 PROCEDURE — 71260 CT THORAX DX C+: CPT | Mod: 26 | Performed by: RADIOLOGY

## 2024-09-30 PROCEDURE — 250N000011 HC RX IP 250 OP 636

## 2024-09-30 PROCEDURE — 70491 CT SOFT TISSUE NECK W/DYE: CPT | Mod: 26 | Performed by: RADIOLOGY

## 2024-09-30 RX ORDER — FLUDEOXYGLUCOSE F 18 200 MCI/ML
10-18 INJECTION, SOLUTION INTRAVENOUS ONCE
Status: COMPLETED | OUTPATIENT
Start: 2024-09-30 | End: 2024-09-30

## 2024-09-30 RX ORDER — IOPAMIDOL 755 MG/ML
10-135 INJECTION, SOLUTION INTRAVASCULAR ONCE
Status: COMPLETED | OUTPATIENT
Start: 2024-09-30 | End: 2024-09-30

## 2024-09-30 RX ADMIN — FLUDEOXYGLUCOSE F 18 10.08 MILLICURIE: 200 INJECTION, SOLUTION INTRAVENOUS at 09:00

## 2024-09-30 RX ADMIN — IOPAMIDOL 101 ML: 755 INJECTION, SOLUTION INTRAVENOUS at 10:03

## 2024-10-03 ENCOUNTER — ONCOLOGY VISIT (OUTPATIENT)
Dept: ONCOLOGY | Facility: CLINIC | Age: 64
End: 2024-10-03
Attending: STUDENT IN AN ORGANIZED HEALTH CARE EDUCATION/TRAINING PROGRAM
Payer: COMMERCIAL

## 2024-10-03 ENCOUNTER — APPOINTMENT (OUTPATIENT)
Dept: LAB | Facility: CLINIC | Age: 64
End: 2024-10-03
Attending: STUDENT IN AN ORGANIZED HEALTH CARE EDUCATION/TRAINING PROGRAM
Payer: COMMERCIAL

## 2024-10-03 VITALS
SYSTOLIC BLOOD PRESSURE: 118 MMHG | HEART RATE: 56 BPM | BODY MASS INDEX: 23.64 KG/M2 | OXYGEN SATURATION: 100 % | WEIGHT: 160.1 LBS | RESPIRATION RATE: 16 BRPM | TEMPERATURE: 97.7 F | DIASTOLIC BLOOD PRESSURE: 78 MMHG

## 2024-10-03 DIAGNOSIS — C82.41 GRADE 3B FOLLICULAR LYMPHOMA OF LYMPH NODES OF NECK (H): ICD-10-CM

## 2024-10-03 LAB
ALBUMIN SERPL BCG-MCNC: 4.2 G/DL (ref 3.5–5.2)
ALP SERPL-CCNC: 57 U/L (ref 40–150)
ALT SERPL W P-5'-P-CCNC: 14 U/L (ref 0–70)
ANION GAP SERPL CALCULATED.3IONS-SCNC: 9 MMOL/L (ref 7–15)
AST SERPL W P-5'-P-CCNC: 27 U/L (ref 0–45)
BILIRUB SERPL-MCNC: 0.7 MG/DL
BUN SERPL-MCNC: 20 MG/DL (ref 8–23)
CALCIUM SERPL-MCNC: 9.5 MG/DL (ref 8.8–10.4)
CHLORIDE SERPL-SCNC: 101 MMOL/L (ref 98–107)
CREAT SERPL-MCNC: 0.88 MG/DL (ref 0.67–1.17)
EGFRCR SERPLBLD CKD-EPI 2021: >90 ML/MIN/1.73M2
ERYTHROCYTE [DISTWIDTH] IN BLOOD BY AUTOMATED COUNT: 13.6 % (ref 10–15)
GLUCOSE SERPL-MCNC: 100 MG/DL (ref 70–99)
HCO3 SERPL-SCNC: 29 MMOL/L (ref 22–29)
HCT VFR BLD AUTO: 36 % (ref 40–53)
HGB BLD-MCNC: 12.1 G/DL (ref 13.3–17.7)
LDH SERPL L TO P-CCNC: 196 U/L (ref 0–250)
MCH RBC QN AUTO: 32.7 PG (ref 26.5–33)
MCHC RBC AUTO-ENTMCNC: 33.6 G/DL (ref 31.5–36.5)
MCV RBC AUTO: 97 FL (ref 78–100)
PLATELET # BLD AUTO: 205 10E3/UL (ref 150–450)
POTASSIUM SERPL-SCNC: 4.3 MMOL/L (ref 3.4–5.3)
PROT SERPL-MCNC: 6.8 G/DL (ref 6.4–8.3)
RBC # BLD AUTO: 3.7 10E6/UL (ref 4.4–5.9)
SODIUM SERPL-SCNC: 139 MMOL/L (ref 135–145)
URATE SERPL-MCNC: 5.5 MG/DL (ref 3.4–7)
WBC # BLD AUTO: 8.3 10E3/UL (ref 4–11)

## 2024-10-03 PROCEDURE — 84550 ASSAY OF BLOOD/URIC ACID: CPT | Performed by: STUDENT IN AN ORGANIZED HEALTH CARE EDUCATION/TRAINING PROGRAM

## 2024-10-03 PROCEDURE — 99213 OFFICE O/P EST LOW 20 MIN: CPT | Performed by: STUDENT IN AN ORGANIZED HEALTH CARE EDUCATION/TRAINING PROGRAM

## 2024-10-03 PROCEDURE — 99215 OFFICE O/P EST HI 40 MIN: CPT | Performed by: STUDENT IN AN ORGANIZED HEALTH CARE EDUCATION/TRAINING PROGRAM

## 2024-10-03 PROCEDURE — 85007 BL SMEAR W/DIFF WBC COUNT: CPT | Performed by: STUDENT IN AN ORGANIZED HEALTH CARE EDUCATION/TRAINING PROGRAM

## 2024-10-03 PROCEDURE — 36415 COLL VENOUS BLD VENIPUNCTURE: CPT | Performed by: STUDENT IN AN ORGANIZED HEALTH CARE EDUCATION/TRAINING PROGRAM

## 2024-10-03 PROCEDURE — 83615 LACTATE (LD) (LDH) ENZYME: CPT | Performed by: STUDENT IN AN ORGANIZED HEALTH CARE EDUCATION/TRAINING PROGRAM

## 2024-10-03 PROCEDURE — 85027 COMPLETE CBC AUTOMATED: CPT | Performed by: STUDENT IN AN ORGANIZED HEALTH CARE EDUCATION/TRAINING PROGRAM

## 2024-10-03 PROCEDURE — 80053 COMPREHEN METABOLIC PANEL: CPT | Performed by: STUDENT IN AN ORGANIZED HEALTH CARE EDUCATION/TRAINING PROGRAM

## 2024-10-03 ASSESSMENT — PAIN SCALES - GENERAL: PAINLEVEL: NO PAIN (0)

## 2024-10-03 NOTE — NURSING NOTE
"Oncology Rooming Note    October 3, 2024 3:36 PM   Quincy Keane is a 64 year old male who presents for:    Chief Complaint   Patient presents with    Blood Draw     Labs drawn via  by RN in lab.  VS taken    Oncology Clinic Visit     Follicular lymphoma      Initial Vitals: /78   Pulse 56   Temp 97.7  F (36.5  C) (Oral)   Resp 16   Wt 72.6 kg (160 lb 1.6 oz)   SpO2 100%   BMI 23.64 kg/m   Estimated body mass index is 23.64 kg/m  as calculated from the following:    Height as of 7/24/24: 1.753 m (5' 9\").    Weight as of this encounter: 72.6 kg (160 lb 1.6 oz). Body surface area is 1.88 meters squared.  No Pain (0) Comment: Data Unavailable   No LMP for male patient.  Allergies reviewed: Yes  Medications reviewed: Yes    Medications: Medication refills not needed today.  Pharmacy name entered into [facility]:    [id number] [place], [place] - [address] [place] [address]. [address]  [facility] [place], [place] - [address] [facility] [phone]    Frailty Screening:   Is the patient here for a new oncology consult visit in cancer care? 2. No      Clinical concerns:  none      Ngoc Warner"

## 2024-10-03 NOTE — LETTER
10/3/2024      Quincy Keane  [address]  [place] [address]      Dear Colleague,    Thank you for referring your patient, Quincy Keane, to the [facility]. Please see a copy of my visit note below.      Judi Mathew is a 64 year old, presenting for the following health issues:  Blood Draw (Labs drawn via  by RN in lab.  VS taken) and Oncology Clinic Visit (Follicular lymphoma )    HPI     Oncology History Overview Note   This is a 62 y/o male with PMH of atrial fibrillation, thyroid dysfunction and probable dermatitis, comes in with newly diagnosed follicular lymphoma.     He was otherwise doing well until he developed neck lump on the left side about 3 months ago. No fevers, chills, night sweat or weight loss. He was asymptomatic from the lymph node. Initial needle biopsy showed atypical lymphoid infiltrate. Subsequent core needle biopsy showed follicular lymphoma grade 3B. IGH/BCL2 rearrangement was positive. No BCL6 or MYC rearrangement. He has not noticed any lumps anywhere else. PET scan showed hypermetabolic left upper cervical lymph ndoes and subcarinal lymph node. Overall stage II, non bulky disease. LDH within normal limits. smIPI at least 2.   EF >50%  He has rate controlled atrial fibrillation.     He comes today to [facility] follow up. He began RCHOP on 6/2024. PET scan after 2 cycles showed near CR. He continued RCHOP for 2 more cycles. PET scan after 4 cycles of RCHOP showed CR. I discussed total 6 cycles of RCHOP considering stage II disease but patient wished to receive less chemotherapy and declined last 2 cycles. 4 cycles of RCHOP may be adequate to control DLBCL in this case as patient has minimal disease burden and has excellent response to treatment. We determined 4 cycles of RCHOP was appropriate. End of treatment PET on 10/2024 showed CR.     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 No fevers, chills, night sweats or weight loss, no lymphadenopathy. No pain.        Objective   /78   Pulse 56   Temp 97.7  F (36.5  C) (Oral)   Resp 16   Wt 72.6 kg (160 lb 1.6 oz)   SpO2 100%   BMI 23.64 kg/m    Body mass index is 23.64 kg/m .  Physical Exam   GENERAL:  Alert oriented well nourished  HEAD: normocephalic atraumatic  SKIN:  no rash, hives, other lesions.  LYMPHATIC: no abnormal lymph nodes palable in cervical, axillary, supraclavicular or inguinal area.  RESP:  No rales or rhonchi, breath sounds bilaterally equal and vesicular  CV:  No tachycardia, S1 S2 normal No murmur.  GI:  Abdomen soft nontender no hepato or splenomegaly  MUSCULOSKELETAL:  No visible joint redness or swelling.  NEURO:  No gross weakness gait normal  PSYCH: pleasant affect    Labs: I personally reviewed complete blood count, differential, renal function test, liver function tests LDH.  No cytopenias, LFTs within normal limits, renal function test within normal limits and LDH is normal as well.    Imaging: I personally reviewed PETCT neck/chest/abdomen/pelvis and discussed with the patient.  Complete response to treatment so far. Tonsillar activity is likely physiological. We will monitor.    Assessment and Plan:    1) Follicular lymphoma grade 3b:  - Patient is in remission and has finished therapy. We will begin surveillance. I will see him every 3 months with physical exam and labs. I discussed limited role of CT scans in detecting relapse in absence of clinical symptoms. Frequent scans come at risk of radiation exposure. Patient agreed to annual scan for 2 years and then we will stop CT scans.    Total time spent on date of service in review of medical records,  review of labs, history taking, physical exam, discussion of assessment and plan, counseling and patient education is 40 minutes.    Avelina Peñaloza MD  Attending Physician  Pager [phone]                    Signed Electronically by: Avelina Peñaloza MD      Again, thank you for allowing me to participate in the care of your patient.        Sincerely,        Avelina Peñaloza MD

## 2024-10-08 NOTE — PROGRESS NOTES
Judi Mathew is a 64 year old, presenting for the following health issues:  Blood Draw (Labs drawn via  by RN in lab.  VS taken) and Oncology Clinic Visit (Follicular lymphoma )    HPI     Oncology History Overview Note   This is a 64 y/o male with PMH of atrial fibrillation, thyroid dysfunction and probable dermatitis, comes in with newly diagnosed follicular lymphoma.     He was otherwise doing well until he developed neck lump on the left side about 3 months ago. No fevers, chills, night sweat or weight loss. He was asymptomatic from the lymph node. Initial needle biopsy showed atypical lymphoid infiltrate. Subsequent core needle biopsy showed follicular lymphoma grade 3B. IGH/BCL2 rearrangement was positive. No BCL6 or MYC rearrangement. He has not noticed any lumps anywhere else. PET scan showed hypermetabolic left upper cervical lymph ndoes and subcarinal lymph node. Overall stage II, non bulky disease. LDH within normal limits. smIPI at least 2.   EF >50%  He has rate controlled atrial fibrillation.     He comes today to \[facility]\"" follow up. He began RCHOP on 6/2024. PET scan after 2 cycles showed near CR. He continued RCHOP for 2 more cycles. PET scan after 4 cycles of RCHOP showed CR. I discussed total 6 cycles of RCHOP considering stage II disease but patient wished to receive less chemotherapy and declined last 2 cycles. 4 cycles of RCHOP may be adequate to control DLBCL in this case as patient has minimal disease burden and has excellent response to treatment. We determined 4 cycles of RCHOP was appropriate. End of treatment PET on 10/2024 showed CR.     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 No fevers, chills, night sweats or weight loss, no lymphadenopathy. No pain.        Objective    /78   Pulse 56   Temp 97.7  F (36.5  C) (Oral)   Resp 16   Wt 72.6 kg (160 lb 1.6 oz)   SpO2 100%   BMI 23.64 kg/m    Body mass index is 23.64 kg/m .  Physical Exam   GENERAL:  Alert oriented well nourished  HEAD: normocephalic atraumatic  SKIN:  no rash, hives, other lesions.  LYMPHATIC: no abnormal lymph nodes palable in cervical, axillary, supraclavicular or inguinal area.  RESP:  No rales or rhonchi, breath sounds bilaterally equal and vesicular  CV:  No tachycardia, S1 S2 normal No murmur.  GI:  Abdomen soft nontender no hepato or splenomegaly  MUSCULOSKELETAL:  No visible joint redness or swelling.  NEURO:  No gross weakness gait normal  PSYCH: pleasant affect    Labs: I personally reviewed complete blood count, differential, renal function test, liver function tests LDH.  No cytopenias, LFTs within normal limits, renal function test within normal limits and LDH is normal as well.    Imaging: I personally reviewed PETCT neck/chest/abdomen/pelvis and discussed with the patient.  Complete response to treatment so far. Tonsillar activity is likely physiological. We will monitor.    Assessment and Plan:    1) Follicular lymphoma grade 3b:  - Patient is in remission and has finished therapy. We will begin surveillance. I will see him every 3 months with physical exam and labs. I discussed limited role of CT scans in detecting relapse in absence of clinical symptoms. Frequent scans come at risk of radiation exposure. Patient agreed to annual scan for 2 years and then we will stop CT scans.    Total time spent on date of service in review of medical records, review of labs, history taking, physical exam, discussion of assessment and plan, counseling and patient education is 40 minutes.    Avelina Peñaloza MD  Attending Physician  Pager [phone]                    Signed Electronically by: Avelina HERNANDEZ  MD Jh

## 2024-10-09 LAB
BASOPHILS # BLD MANUAL: 0.1 10E3/UL (ref 0–0.2)
BASOPHILS NFR BLD MANUAL: 1 %
EOSINOPHIL # BLD MANUAL: 2.1 10E3/UL (ref 0–0.7)
EOSINOPHIL NFR BLD MANUAL: 25 %
LYMPHOCYTES # BLD MANUAL: 0.4 10E3/UL (ref 0.8–5.3)
LYMPHOCYTES NFR BLD MANUAL: 5 %
MONOCYTES # BLD MANUAL: 0.4 10E3/UL (ref 0–1.3)
MONOCYTES NFR BLD MANUAL: 5 %
NEUTROPHILS # BLD MANUAL: 5.3 10E3/UL (ref 1.6–8.3)
NEUTROPHILS NFR BLD MANUAL: 64 %
PATH REV: ABNORMAL
PLAT MORPH BLD: ABNORMAL
RBC MORPH BLD: ABNORMAL

## 2024-10-10 ENCOUNTER — PATIENT OUTREACH (OUTPATIENT)
Dept: ONCOLOGY | Facility: CLINIC | Age: 64
End: 2024-10-10
Payer: COMMERCIAL

## 2024-10-10 NOTE — PROGRESS NOTES
[facility]: Cancer Care Chart Review                                                                                        Situation: Patient chart reviewed by Oncology RN Care Coordinator.     Background: Pt seen by Dr. Peñaloza on 10/3/24.     Assessment: No coordination needed at this time by RNCC. Status set as Maintenance for enrollment.     Plan/Recommendations: Follow-up with provider in 3 months.     ISIS SheikhN, RN  RN Care Coordinator   [phone]

## 2024-10-20 RX ORDER — CARVEDILOL 3.12 MG/1
3.12 TABLET ORAL 2 TIMES DAILY WITH MEALS
Qty: 60 TABLET | Refills: 0 | OUTPATIENT
Start: 2024-10-20

## 2024-12-04 ENCOUNTER — OFFICE VISIT (OUTPATIENT)
Dept: CARDIOLOGY | Facility: CLINIC | Age: 64
End: 2024-12-04
Attending: INTERNAL MEDICINE
Payer: COMMERCIAL

## 2024-12-04 VITALS
BODY MASS INDEX: 24.59 KG/M2 | HEIGHT: 69 IN | WEIGHT: 166 LBS | SYSTOLIC BLOOD PRESSURE: 135 MMHG | HEART RATE: 58 BPM | OXYGEN SATURATION: 100 % | DIASTOLIC BLOOD PRESSURE: 80 MMHG

## 2024-12-04 DIAGNOSIS — Z00.00 ADULT WELLNESS VISIT: ICD-10-CM

## 2024-12-04 DIAGNOSIS — I47.29 NON-SUSTAINED VENTRICULAR TACHYCARDIA (H): ICD-10-CM

## 2024-12-04 DIAGNOSIS — I48.19 PERSISTENT ATRIAL FIBRILLATION (H): Primary | ICD-10-CM

## 2024-12-04 NOTE — LETTER
12/4/2024    Avelina Peñaloza MD  [address]  [place] [address]    RE: Quincy Keane       Dear Colleague,     I had the pleasure of seeing Quincy Keane in the [facility].              ~Cardiology Clinic Visit~    Quincy Keane MRN# [id number]   YOB: 1960 Age: 64 year old   Primary Cardiologist: Dr. Carter           Assessment and Plan:   Quincy Keane is a very pleasant 64 year old male who is here today for 5-month follow-up    Persistent atrial fibrillation  On metoprolol tartrate 25 mg twice daily  Anticoagulated with Xarelto 20 mg daily  Asymptomatic     Nonsustained ventricular tachycardia noted on Zio monitor 8/2024  Remained asymptomatic   Has not had a formal ischemic evaluation    Follicular lymphoma  Follows with oncology        Plan:   -Cardioversion was discussed and recommended. Patient willing to proceed.     We discussed Risk, Benefits and Indication of proceeding with direct current cardioversion (DCCV), including but not limited to use of anesthesia, surface burns, [id number] arrhythmias requiring further treatment, discomfort and stroke.  The patient voiced understanding and understands that a  will be needed given the use of conscious sedation. A consent form will be signed by the procedural physician.    -Patient has missed doses of Xarelto. Emphasized the importance of taking the Xarelto uninterrupted for the next 3-4 weeks prior to cardioversion. Patient understanding and will take as prescribed.   -Continue metoprolol tartrate 25 mg twice daily  -If cardioversion unsuccessful, would consider a Zio monitor to reassess if  patient is having adequate rate control after transitioning from carvedilol to metoprolol.       Follow up with me in 2 months      Miri Zabala PA-C  Physician Assistant   [facility]  Pager: [phone]          History of Presenting Illness:    Quincy Keane is a very pleasant 64  year old male with a history of follicular lymphoma of the neck (undergoing R-CHOP chemotherapy), and persistent atrial fibrillation.    In brief, patient established cardiology care in July 2024 after it was noted he had an irregular heartbeat at a recent oncology visit.  He was started on carvedilol 3.125 mg twice daily.  Echocardiogram demonstrated LVEF of 55% with no wall motion abnormalities.  There was severe left atrial enlargement and mild right atrial enlargement.  At the conclusion of office visit, patient was started on rivaroxaban 20 mg daily.  Patient preferred to hold on proceeding with the electrocardioversion.    Zio monitor from August 2024 showed patient had 1 run of Ventricular Tachycardia occurred lasting 12.4 secs with a max rate of 132 bpm (avg 114 bpm). Atrial Fibrillation occurred continuously (100% burden), ranging from  bpm (avg of 98 bpm).  His carvedilol was discontinued.  He was started on metoprolol tartrate 25 mg twice daily.    Patient was scheduled to follow up in clinic in October but was rescheduled.     Patient reports he has some dyspnea when he exerts himself up a flight of stairs. This is not new for him. He has not been compliant in taking the Xarelto daily. He takes it every other day.  He also takes metoprolol once a day and rarely takes it twice a day.  He is inquiring if these medications are still needed.    Denies orthopnea and PND. Denies chest pain, palpitations, lightheadedness, dizziness, near syncope and syncope. Denies epistaxis, ecchymosis, and melena.     Taking medications daily as prescribed.      Blood pressure 135/80 and HR 58 in clinic today.         Social History       Social History     Socioeconomic History     Marital status:      Spouse name: Not on file     Number of children: Not on file     Years of education: Not on file     Highest education level: Not on file   Occupational History     Not on file   Tobacco Use     Smoking status:  "Never     Smokeless tobacco: Never   Substance and Sexual Activity     Alcohol use: Not Currently     Drug use: Never     Sexual activity: Not on file   Other Topics Concern     Not on file   Social History Narrative     Not on file     Social Drivers of Health     Financial Resource Strain: Not on file   Food Insecurity: Not on file   Transportation Needs: Not on file   Physical Activity: Not on file   Stress: No Stress Concern Present (3/12/2024)    Received from [employer], [facility]    [employer] - Occupational Stress Questionnaire      Feeling of Stress : Not at all   Social Connections: Unknown (3/12/2024)    Received from [employer], [facility]    Social Connection and Isolation Panel [NHANES]      Frequency of Communication with Friends and Family: Not on file      Frequency of Social Gatherings with Friends and Family: Not on file      Attends [employer] Services: Not on file      Active Member of Clubs or Organizations: Not on file      Attends Club or Organization Meetings: Not on file      Marital Status:    Interpersonal Safety: Not on file   Housing Stability: Not on file            Review of Systems:   Please see HPI         Physical Exam:   Vitals: /80 (BP Location: Left arm, Patient Position: Sitting)   Pulse 58   Ht 1.753 m (5' 9\")   Wt 75.3 kg (166 lb)   SpO2 100%   BMI 24.51 kg/m     Wt Readings from Last 4 Encounters:   12/04/24 75.3 kg (166 lb)   10/03/24 72.6 kg (160 lb 1.6 oz)   08/22/24 74.8 kg (165 lb)   08/01/24 75.6 kg (166 lb 11.2 oz)     GEN: well nourished, in no acute distress.  NECK: Supple. JVP was not appreciated.   C/V:  IRR  RESP: Respirations are unlabored. Clear to auscultation bilaterally without wheezing, rales, or rhonchi.  EXTREM: Bilateral lower extremities with no edema.   SKIN: " "Warm and dry.        Data:   LIPID RESULTS:  No results found for: \"CHOL\", \"HDL\", \"LDL\", \"TRIG\", \"CHOLHDLRATIO\"  LIVER ENZYME RESULTS:  Lab Results   Component Value Date    AST 27 10/03/2024    ALT 14 10/03/2024     CBC RESULTS:  Lab Results   Component Value Date    WBC 8.3 10/03/2024    RBC 3.70 (L) 10/03/2024    HGB 12.1 (L) 10/03/2024    HCT 36.0 (L) 10/03/2024    MCV 97 10/03/2024    MCH 32.7 10/03/2024    MCHC 33.6 10/03/2024    RDW 13.6 10/03/2024     10/03/2024     BMP RESULTS:  Lab Results   Component Value Date     10/03/2024    POTASSIUM 4.3 10/03/2024    CHLORIDE 101 10/03/2024    CO2 29 10/03/2024    ANIONGAP 9 10/03/2024     (H) 10/03/2024    BUN 20.0 10/03/2024    CR 0.88 10/03/2024    GFRESTIMATED >90 10/03/2024    TERRA 9.5 10/03/2024      A1C RESULTS:  No results found for: \"A1C\"  INR RESULTS:  No results found for: \"INR\"         Medications     Current Outpatient Medications   Medication Sig Dispense Refill     metoprolol tartrate (LOPRESSOR) 25 MG tablet Take 1 tablet (25 mg) by mouth 2 times daily 180 tablet 3     rivaroxaban ANTICOAGULANT (XARELTO) 20 MG TABS tablet Take 1 tablet (20 mg) by mouth daily (with dinner) 30 tablet 4     acyclovir (ZOVIRAX) 400 MG tablet Take 1 tablet (400 mg) by mouth every 12 hours for 30 days 60 tablet 11     prochlorperazine (COMPAZINE) 10 MG tablet Take 1 tablet (10 mg) by mouth every 6 hours as needed for nausea or vomiting (Patient not taking: Reported on 12/4/2024) 30 tablet 2          Past Medical History     Past Medical History:   Diagnosis Date     Chronic atrial fibrillation (H)      Hyperthyroidism      Past Surgical History:   Procedure Laterality Date     arthroscopic surgery Right      Family History   Problem Relation Age of Onset     Cerebrovascular Disease Father             Allergies   Penicillin v and Cats      This note was completed in part using dictation via the Dragon voice recognition software. Some word and grammatical " errors may occur and must be interpreted in the appropriate clinical context.  If there are any questions pertaining to this issue, please contact me for further clarification.      Thank you for allowing me to participate in the care of your patient.      Sincerely,     Miri Zabala PA-C     [facility]  cc:   Froilan Carter MD  [address] [place] [phone]

## 2024-12-04 NOTE — PATIENT INSTRUCTIONS
Thank you for your visit with the [facility] today.    Today's plan:   Medication changes: none   Continue taking the metoprolol twice a day   Take Xarelto uninterrupted for the next three weeks. You cannot miss a dose of this medication else your cardioversion will have to be canceled.   Follow up with me in 2 months after your cardioversion     If you have questions or concerns please call the nurse team at [phone] or send a [employer] message.     Scheduling phone number: [phone]    It was a pleasure seeing you today!     Miri Zabala PA-C   Physician Assistant   [facility]

## 2024-12-04 NOTE — PROGRESS NOTES
~Cardiology Clinic Visit~    Quincy Keane MRN# [id number]   YOB: 1960 Age: 64 year old   Primary Cardiologist: Dr. Carter           Assessment and Plan:   Quincy Keane is a very pleasant 64 year old male who is here today for 5-month follow-up    Persistent atrial fibrillation  On metoprolol tartrate 25 mg twice daily  Anticoagulated with Xarelto 20 mg daily  Asymptomatic     Nonsustained ventricular tachycardia noted on Zio monitor 8/2024  Remained asymptomatic   Has not had a formal ischemic evaluation    Follicular lymphoma  Follows with oncology        Plan:   -Cardioversion was discussed and recommended. Patient willing to proceed.     We discussed Risk, Benefits and Indication of proceeding with direct current cardioversion (DCCV), including but not limited to use of anesthesia, surface burns, [id number] arrhythmias requiring further treatment, discomfort and stroke.  The patient voiced understanding and understands that a  will be needed given the use of conscious sedation. A consent form will be signed by the procedural physician.    -Patient has missed doses of Xarelto. Emphasized the importance of taking the Xarelto uninterrupted for the next 3-4 weeks prior to cardioversion. Patient understanding and will take as prescribed.   -Continue metoprolol tartrate 25 mg twice daily  -If cardioversion unsuccessful, would consider a Zio monitor to reassess if  patient is having adequate rate control after transitioning from carvedilol to metoprolol.       Follow up with me in 2 months      Miri Zabala PA-C  Physician Assistant   [facility]  Pager: [phone]          History of Presenting Illness:    Quincy Keane is a very pleasant 64 year old male with a history of follicular lymphoma of the neck (undergoing R-CHOP chemotherapy), and persistent atrial fibrillation.    In brief, patient established cardiology care in July 2024 after it was noted he  had an irregular heartbeat at a recent oncology visit.  He was started on carvedilol 3.125 mg twice daily.  Echocardiogram demonstrated LVEF of 55% with no wall motion abnormalities.  There was severe left atrial enlargement and mild right atrial enlargement.  At the conclusion of office visit, patient was started on rivaroxaban 20 mg daily.  Patient preferred to hold on proceeding with the electrocardioversion.    Zio monitor from August 2024 showed patient had 1 run of Ventricular Tachycardia occurred lasting 12.4 secs with a max rate of 132 bpm (avg 114 bpm). Atrial Fibrillation occurred continuously (100% burden), ranging from  bpm (avg of 98 bpm).  His carvedilol was discontinued.  He was started on metoprolol tartrate 25 mg twice daily.    Patient was scheduled to follow up in clinic in October but was rescheduled.     Patient reports he has some dyspnea when he exerts himself up a flight of stairs. This is not new for him. He has not been compliant in taking the Xarelto daily. He takes it every other day.  He also takes metoprolol once a day and rarely takes it twice a day.  He is inquiring if these medications are still needed.    Denies orthopnea and PND. Denies chest pain, palpitations, lightheadedness, dizziness, near syncope and syncope. Denies epistaxis, ecchymosis, and melena.     Taking medications daily as prescribed.      Blood pressure 135/80 and HR 58 in clinic today.         Social History       Social History     Socioeconomic History    Marital status:      Spouse name: Not on file    Number of children: Not on file    Years of education: Not on file    Highest education level: Not on file   Occupational History    Not on file   Tobacco Use    Smoking status: Never    Smokeless tobacco: Never   Substance and Sexual Activity    Alcohol use: Not Currently    Drug use: Never    Sexual activity: Not on file   Other Topics Concern    Not on file   Social History Narrative    Not on  "file     Social Drivers of Health     Financial Resource Strain: Not on file   Food Insecurity: Not on file   Transportation Needs: Not on file   Physical Activity: Not on file   Stress: No Stress Concern Present (3/12/2024)    [facility], [facility]    [employer] - Occupational Stress Questionnaire     Feeling of Stress : Not at all   Social Connections: Unknown (3/12/2024)    [facility], [facility]    Social Connection and Isolation Panel [NHANES]     Frequency of Communication with Friends and Family: Not on file     Frequency of Social Gatherings with Friends and Family: Not on file     Attends Confucianist Services: Not on file     Active Member of Clubs or Organizations: Not on file     Attends Club or Organization Meetings: Not on file     Marital Status:    Interpersonal Safety: Not on file   Housing Stability: Not on file            Review of Systems:   Please see HPI         Physical Exam:   Vitals: /80 (BP Location: Left arm, Patient Position: Sitting)   Pulse 58   Ht 1.753 m (5' 9\")   Wt 75.3 kg (166 lb)   SpO2 100%   BMI 24.51 kg/m     Wt Readings from Last 4 Encounters:   12/04/24 75.3 kg (166 lb)   10/03/24 72.6 kg (160 lb 1.6 oz)   08/22/24 74.8 kg (165 lb)   08/01/24 75.6 kg (166 lb 11.2 oz)     GEN: well nourished, in no acute distress.  NECK: Supple. JVP was not appreciated.   C/V:  IRR  RESP: Respirations are unlabored. Clear to auscultation bilaterally without wheezing, rales, or rhonchi.  EXTREM: Bilateral lower extremities with no edema.   SKIN: Warm and dry.        Data:   LIPID RESULTS:  No results found for: \"CHOL\", \"HDL\", \"LDL\", \"TRIG\", \"CHOLHDLRATIO\"  LIVER ENZYME RESULTS:  Lab Results   Component Value Date    AST 27 10/03/2024    ALT 14 10/03/2024     CBC RESULTS:  Lab " "Results   Component Value Date    WBC 8.3 10/03/2024    RBC 3.70 (L) 10/03/2024    HGB 12.1 (L) 10/03/2024    HCT 36.0 (L) 10/03/2024    MCV 97 10/03/2024    MCH 32.7 10/03/2024    MCHC 33.6 10/03/2024    RDW 13.6 10/03/2024     10/03/2024     BMP RESULTS:  Lab Results   Component Value Date     10/03/2024    POTASSIUM 4.3 10/03/2024    CHLORIDE 101 10/03/2024    CO2 29 10/03/2024    ANIONGAP 9 10/03/2024     (H) 10/03/2024    BUN 20.0 10/03/2024    CR 0.88 10/03/2024    GFRESTIMATED >90 10/03/2024    TERRA 9.5 10/03/2024      A1C RESULTS:  No results found for: \"A1C\"  INR RESULTS:  No results found for: \"INR\"         Medications     Current Outpatient Medications   Medication Sig Dispense Refill    metoprolol tartrate (LOPRESSOR) 25 MG tablet Take 1 tablet (25 mg) by mouth 2 times daily 180 tablet 3    rivaroxaban ANTICOAGULANT (XARELTO) 20 MG TABS tablet Take 1 tablet (20 mg) by mouth daily (with dinner) 30 tablet 4    acyclovir (ZOVIRAX) 400 MG tablet Take 1 tablet (400 mg) by mouth every 12 hours for 30 days 60 tablet 11    prochlorperazine (COMPAZINE) 10 MG tablet Take 1 tablet (10 mg) by mouth every 6 hours as needed for nausea or vomiting (Patient not taking: Reported on 12/4/2024) 30 tablet 2          Past Medical History     Past Medical History:   Diagnosis Date    Chronic atrial fibrillation (H)     Hyperthyroidism      Past Surgical History:   Procedure Laterality Date    arthroscopic surgery Right      Family History   Problem Relation Age of Onset    Cerebrovascular Disease Father             Allergies   Penicillin v and Cats      This note was completed in part using dictation via the Dragon voice recognition software. Some word and grammatical errors may occur and must be interpreted in the appropriate clinical context.  If there are any questions pertaining to this issue, please contact me for further clarification.  "

## 2024-12-09 ENCOUNTER — VIRTUAL VISIT (OUTPATIENT)
Dept: ENDOCRINOLOGY | Facility: CLINIC | Age: 64
End: 2024-12-09
Payer: COMMERCIAL

## 2024-12-09 DIAGNOSIS — E05.00 GRAVES DISEASE: Primary | ICD-10-CM

## 2024-12-09 PROCEDURE — 99214 OFFICE O/P EST MOD 30 MIN: CPT | Mod: 95 | Performed by: INTERNAL MEDICINE

## 2024-12-09 PROCEDURE — G2211 COMPLEX E/M VISIT ADD ON: HCPCS | Mod: 95 | Performed by: INTERNAL MEDICINE

## 2024-12-09 NOTE — PROGRESS NOTES
"Virtual Visit Details    Type of service:  Video Visit     Originating Location (pt. Location): {video visit patient location:170655::\"Home\"}  {PROVIDER LOCATION On-site should be selected for visits conducted from your clinic location or adjoining [facility] hospital, academic office, or other nearby [facility] building. Off-site should be selected for all other provider locations, including home:[id number]}  Distant Location (provider location):  {virtual location provider:887377}  Platform used for Video Visit: {Virtual Visit Platforms:010445::\"Advaxis\"}  "

## 2024-12-09 NOTE — PROGRESS NOTES
Audio-Visit Details    Type of service:  Video Visit  Video Start Time: 1438  Video End Time: 1455  Originating Location (pt. Location): Home, [place]  Distant Location (provider location):  Home  Platform used for Video Visit: Javier Lopez MD      HISTORY OF PRESENT ILLNESS  Quincy Keane is a 64 year old male, Afib and follicular lymphoma of the neck (s/p R-CHOP chemotherapy),  who is here for Follow-up Graves' disease.    Interval History  Pt had Afib, planning to cardioversion 12/17/24  No palpitation  Pt takes metoprolol and Xarelto couple times/week, ans started taking Xarelto daily since 12/4/24 after his cardiology clinic visit  Pt takes blood thinner herb daily  Transient hoarseness that resolved  No obstructive symptoms  No eye symptoms    Initial visit  Per record reviewed, Kendell Myers MD - 03/12/2024 8:00 AM CDT  Formatting of this note might be different from the original.  [place] Office Visit  Subjective  Clayton is a 63 year old male who presents for Referral Endocrinology, Lump on left side of neck.    Clayton is a 63 y.o. M who presents to clinic today requesting referral for endocrinology.    He follows with dermatology in regards to a rash. He has had an erythematous patches with itch on his upper back and arms. These lesions were biopsied and consistent with contact dermatitis. He is not convinced with the diagnosis.    In his evaluation, he did also undergo laboratory testing. His TSH was significantly suppressed at that time. This is 2 years ago, and he never proceeded with follow-up.     Last couple years, had rash on the back.  Went to dermatologist--> recommend seeing allergist  Rash is intermittent, gone during vacation.  Pt is wondering if rashes could be from extra heat and sweating from thyroid    Weight stable  Pt reports having irregular heart beat, asymptomatic. Report that his primary think it could be from thyroid, plan follow up 1-2 week  No  shakiness  Sleep is find  No eye pain/ double vision  No smoking    Pt has thyroid problem for about 45 years, saw thyroid doctor and was told that he needed to take thyroid medication the rest of your life. Could not recall the name or how he took the medication.  Pt took thyroid medication and stopped 20 years ago  No changes in symptoms since stopped medication    Pt feels hot intermittently and rash  Mild headache, intermittently    Lump on the neck about 1 month, size stable, not growth  No mass on the front of neck  No problem swallowing/ breathing/ voice change    No h/o radiation Tx  No FH thyroid    Had CT with contrast 3/20/24 for neck mass, pt reported plan appointment 3/28/24 for the biopsy      No biotin/ hair skin nail supplement  Pt drinks herbal tea daily    REVIEW OF SYSTEMS  10 point negative except as mentioned in HPI    Past Medical/Surgical History:  H/o thyroid problem    Medications  Current Outpatient Medications   Medication Sig Dispense Refill    acyclovir (ZOVIRAX) 400 MG tablet Take 1 tablet (400 mg) by mouth every 12 hours for 30 days 60 tablet 11    metoprolol tartrate (LOPRESSOR) 25 MG tablet Take 1 tablet (25 mg) by mouth 2 times daily 180 tablet 3    prochlorperazine (COMPAZINE) 10 MG tablet Take 1 tablet (10 mg) by mouth every 6 hours as needed for nausea or vomiting (Patient not taking: Reported on 12/4/2024) 30 tablet 2    rivaroxaban ANTICOAGULANT (XARELTO) 20 MG TABS tablet Take 1 tablet (20 mg) by mouth daily (with dinner) 30 tablet 4     No current facility-administered medications for this visit.       Allergies  Allergies   Allergen Reactions    Penicillin V Unknown     Reaction Date: 08Sep2011; Annotation - [id number]: unknown reaction, childhood reaction    Cats      Other Reaction(s): Rhinitis         Family History  family history includes Cerebrovascular Disease in his father.    Social History  Social History     Tobacco Use    Smoking status: Never    Smokeless  tobacco: Never   Substance Use Topics    Alcohol use: Not Currently       Physical Exam during VDO portion  GENERAL :  In no apparent distress  RESP: Normal breathing  NEURO: awake, alert, responds appropriately to questions.      DATA REVIEW  Labs/Imaging  Lab Results   Component Value Date    TSH 0.81 08/22/2024       Latest Reference Range & Units 10/03/24 15:13   ALT 0 - 70 U/L 14      Latest Reference Range & Units 10/03/24 15:13   WBC 4.0 - 11.0 10e3/uL 8.3     Narrative & Impression   Combined Report of: PET and CT on 9/30/2024 10:33 AM:      FOLLOW-UP APPOINTMENT: 10/3     1. PET of the neck, chest, abdomen, and pelvis.  2. PET CT Fusion for Attenuation Correction and Anatomical  Localization.  3. Diagnostic CT of the chest, abdomen and pelvis with intravenous  contrast obtained for diagnostic interpretation.  4. 3D MIP and PET-CT fused images were processed on an independent  workstation and archived to PACS and reviewed by a radiologist.     Technique:     1. PET: The patient received 10.08 mCi of F-18-FDG. The serum glucose  was 98 mg/dL prior to administration. Body weight was 74.8 kg. Images  were evaluated in the axial, sagittal, and coronal planes as well as  the rotational whole body MIP. Images were acquired from cranial  vertex to feet.     UPTAKE WAS MEASURED AT 63 MINUTES.      2. CT: Volumetric acquisition for clinical interpretation of the  chest, abdomen, and pelvis acquired at 3 mm sections. The chest,  abdomen, and pelvis were evaluated at 5 mm sections in bone, soft  tissue, and lung windows. High resolution images of the neck were  obtained with multiple oblique projection reformats.      Contrast and Medications:  IV contrast: 101 mL of Isovue 370 intravenously.  PO contrast: 500 mL of water.  Additional Medications: None.     3. 3D MIP and PET-CT fused images were processed on an independent  workstation and archived to PACS and reviewed by a radiologist.     INDICATION: Grade 3b  follicular lymphoma of lymph nodes of neck (H).     ADDITIONAL INFORMATION OBTAINED FROM EMR: 54-year-old patient  diagnosed with follicular lymphoma after finding the lump in the neck.  Currently on R-CHOP, with complete metabolic response on prior PET/CT.     COMPARISON: Prior PET/CT of 7/29/2024 and 6/10/2024.     FINDINGS:      BACKGROUND: Liver SUV max = 3.40, Aorta Blood SUV max = 2.58.      HEAD/NECK:     Lymph nodes: No FDG avid or abnormally enlarged lymph nodes.  Previously involved left level IIb lymph node is measuring 1.0 x 0.5  (4/132), with metabolic activity similar to background (Deauville 1)     Pharyngeal mucosal spaces: There is mild asymmetrical uptake of the  left palatine tonsil (maximum SUV 7.1), this is highly nonspecific in  patients under chemotherapy. Other tonsils are within normal limits.  Tongue base is normal. Oral tongue and buccal mucosa of the oral  cavity is normal. Oropharynx, hypopharynx and larynx are within normal  limits.     Sinonasal region: Paranasal sinuses are clear. No mass within the  nasal cavity.  Salivary glands: The major salivary glands are within normal limits.   Thyroid gland: The thyroid gland is within normal limits.   Vascular structures: The major vasculature of the neck are patent.  Brain: No abnormal FDG avid lesion or abnormal enhancement.   Orbital cavities: No abnormal FDG avid lesion or abnormal enhancement.        CHEST:     Lymph nodes: Calcified right hilar lymph node without metabolic  activity. Metabolic activity within the previously metabolic  subcarinal lymph node is at background levels (Deauville 1). Note that  the prior uptake was indeterminate and could represent a reactive  node.     Lungs:Mild subpleural scarring in the lung apices.  The central  tracheobronchial tree is clear. No acute consolidation. Tiny calcified  granuloma of the right lower lobe (7/84). No pleural effusion or  pneumothorax.      Heart and great vessels: Heart size is  within normal limits. No  pericardial effusion. The thoracic aorta and main pulmonary artery are  within normal limits. The esophagus is unremarkable.      Breasts: No abnormal uptake in the breasts.     ABDOMEN AND PELVIS:     Liver: No FDG avid lesion.Similar hypoattenuating lesions and  geographic steatosis. No intrahepatic or extrahepatic biliary ductal  dilatation. Gallbladder is within normal limits.   Pancreas: The pancreas is within normal limits. No pancreatic ductal  dilatation.   Spleen: No FDG avid lesion.  Adrenal glands: No FDG avid foci.  Kidneys: No FDG avid lesion. No hydronephrosis. The urinary bladder is  unremarkable. Bilateral renal cysts.     Prostatomegaly with prostatic parenchymal calcifications. Pelvic  phleboliths. Mild perianal uptake, which may represent inflamed  hemorrhoids.  Stable right-sided mild hydrocele.  Gastrointestinal system: Normal caliber of the small and large bowel.   Lymph nodes: No FDG avid or abnormally enlarged lymph nodes.  Vascular structures: Normal caliber of the abdominal aorta.  No free air, free fluid, or fluid collection.      EXTREMITIES:      No abnormal FDG uptake in the visualized extremities.     BONES AND SOFT TISSUES:      Persistent mild uptake associated with similar appearing 0.5 cm well  marginated subcortical lytic lesion in posterior right 8th rib (3/265)  with SUV max 3.7, previously 3.9.  No other abnormal FDG uptake in the skeleton     Surgical screws in medial right femoral condyle, possibly prior MCL  reconstruction. Degenerative changes in right knee with small joint  effusion and intra-articular calcification with continued mild  inflammatory uptake. Avascular necrosis appearance of the right  femoral head with subchondral cystic and sclerotic changes, without  collapse.    Moderate scoliotic curvature of the thoracolumbar spine,  and multilevel degenerative changes in the spine.   No new lytic or blastic osseous lesions.      SPINAL CANAL:       No evident canal compromise. No abnormal FDG avid lesion.                                                                         IMPRESSION: Patient with follicular lymphoma after finding the lump in  the neck. Currently on R-CHOP.  1. Indeterminate new uptake within the left palatine tonsil (Deauville  5). Inflammatory versus lymphoma.  2. Otherwise no concerning hypermetabolic lesions.  3. Similar appearing mildly hypermetabolic subcentimeter lytic lesion  in right posterior eighth rib, likely unrelated to lymphoma.           ASSESSMENT/PLAN:   ## H/o subclinical hyperthyroidism, most likely from Graves' Disease  ## Follicular large B-cell lymphoma s/p R-CHOP  ## Afib  For thyrotoxicosis, thyroid uptake and scan showed diffuse I123 uptake 46% 5/2024.  Previously not interested in treatment for Graves' disease. Recheck labs 8/2024, thyroid function test was normal.  Now with diagnosed Afib.  Today, we discussed that hyperthyroid can cause Afib. Will recheck his labs to see if Graves' disease is active or not  If patient has active Graves', we discussed options for medication, MURRIETA and surgery, patient prefers medication.  Discussed about methimazole.  -- labs to [facility]   -- I will touchbase cardiology team about thyroid condition    Follow-up 3-4 months    The longitudinal plan of care for the diagnosis(es)/condition(s) as documented were addressed during this visit. Due to the added complexity in care, I will continue to support Quincy in the subsequent management and with ongoing continuity of care.    Cedric Lopez MD

## 2024-12-09 NOTE — NURSING NOTE
Current patient location: [address]  [facility] [address]    Is the patient currently in the state of [place]? YES    Visit mode:VIDEO    If the visit is dropped, the patient can be reconnected by:VIDEO VISIT: Text to cell phone:   Telephone Information:   Mobile [phone]       Will anyone else be joining the visit? NO  (If patient encounters technical issues they should call [phone])    Are changes needed to the allergy or medication list? No and Pt stated no med changes    Are refills needed on medications prescribed by this physician? NO    Rooming Documentation:  Not applicable    Reason for visit: RECHECK    Praveena HARGROVE

## 2024-12-09 NOTE — LETTER
"12/9/2024      Quincy Keane  [address]  [place] [address]      Dear Colleague,    Thank you for referring your patient, Quincy Keane, to the [facility] SPECIALTY [facility] Please see a copy of my visit note below.    Virtual Visit Details    Type of service:  Video Visit     Originating Location (pt. Location): {video visit patient location:717242::\"Home\"}  {PROVIDER LOCATION On-site should be selected for visits conducted from your clinic location or adjoining [facility] hospital, academic office, or other nearby [facility] building. Off-site should be selected for all other provider locations, including home:[id number]}  Distant Location (provider location):  {virtual location provider:228486}  Platform used for Video Visit: {Virtual Visit Platforms:299338::\"AmWell\"}    Audio-Visit Details    Type of service:  Video Visit  Video Start Time: 1438  Video End Time: 1455  Originating Location (pt. Location): [place], [place]  Distant Location (provider location):  Home  Platform used for Video Visit: [facility]    Cedric Lopez MD      HISTORY OF PRESENT ILLNESS  Quincy Keane is a 64 year old male, Afib and follicular lymphoma of the neck (s/p R-CHOP chemotherapy),  who is here for Follow-up Graves' disease.    Interval History  Pt had Afib, planning to cardioversion 12/17/24  No palpitation  Pt takes metoprolol and Xarelto couple times/week, ans started taking Xarelto daily since 12/4/24 after his cardiology clinic visit  Pt takes blood thinner herb daily  Transient hoarseness that resolved  No obstructive symptoms  No eye symptoms    Initial visit  Per record reviewed, Kendell Myers MD - 03/12/2024 8:00 AM CDT  Formatting of this note might be different from the original.  [place] Office Visit  Subjective  Clayton is a 63 year old male who presents for Referral Endocrinology, Lump on left side of neck.    Clayton is a 63 y.o. M who presents to clinic today requesting referral for " endocrinology.    He follows with dermatology in regards to a rash. He has had an erythematous patches with itch on his upper back and arms. These lesions were biopsied and consistent with contact dermatitis. He is not convinced with the diagnosis.    In his evaluation, he did also undergo laboratory testing. His TSH was significantly suppressed at that time. This is 2 years ago, and he never proceeded with follow-up.     Last couple years, had rash on the back.  Went to dermatologist--> recommend seeing allergist  Rash is intermittent, gone during vacation.  Pt is wondering if rashes could be from extra heat and sweating from thyroid    Weight stable  Pt reports having irregular heart beat, asymptomatic. Report that his primary think it could be from thyroid, plan follow up 1-2 week  No shakiness  Sleep is find  No eye pain/ double vision  No smoking    Pt has thyroid problem for about 45 years, saw thyroid doctor and was told that he needed to take thyroid medication the rest of your life. Could not recall the name or how he took the medication.  Pt took thyroid medication and stopped 20 years ago  No changes in symptoms since stopped medication    Pt feels hot intermittently and rash  Mild headache, intermittently    Lump on the neck about 1 month, size stable, not growth  No mass on the front of neck  No problem swallowing/ breathing/ voice change    No h/o radiation Tx  No FH thyroid    Had CT with contrast 3/20/24 for neck mass, pt reported plan appointment 3/28/24 for the biopsy      No biotin/ hair skin nail supplement  Pt drinks herbal tea daily    REVIEW OF SYSTEMS  10 point negative except as mentioned in HPI    Past Medical/Surgical History:  H/o thyroid problem    Medications  Current Outpatient Medications   Medication Sig Dispense Refill     acyclovir (ZOVIRAX) 400 MG tablet Take 1 tablet (400 mg) by mouth every 12 hours for 30 days 60 tablet 11     metoprolol tartrate (LOPRESSOR) 25 MG tablet Take 1  tablet (25 mg) by mouth 2 times daily 180 tablet 3     prochlorperazine (COMPAZINE) 10 MG tablet Take 1 tablet (10 mg) by mouth every 6 hours as needed for nausea or vomiting (Patient not taking: Reported on 12/4/2024) 30 tablet 2     rivaroxaban ANTICOAGULANT (XARELTO) 20 MG TABS tablet Take 1 tablet (20 mg) by mouth daily (with dinner) 30 tablet 4     No current facility-administered medications for this visit.       Allergies  Allergies   Allergen Reactions     Penicillin V Unknown     Reaction Date: 08Sep2011; Annotation - 09Aug2013: unknown reaction, childhood reaction     Cats      Other Reaction(s): Rhinitis         Family History  family history includes Cerebrovascular Disease in his father.    Social History  Social History     Tobacco Use     Smoking status: Never     Smokeless tobacco: Never   Substance Use Topics     Alcohol use: Not Currently       Physical Exam during VDO portion  GENERAL :  In no apparent distress  RESP: Normal breathing  NEURO: awake, alert, responds appropriately to questions.      DATA REVIEW  Labs/Imaging  Lab Results   Component Value Date    TSH 0.81 08/22/2024       Latest Reference Range & Units 10/03/24 15:13   ALT 0 - 70 U/L 14      Latest Reference Range & Units 10/03/24 15:13   WBC 4.0 - 11.0 10e3/uL 8.3     Narrative & Impression   Combined Report of: PET and CT on 9/30/2024 10:33 AM:      FOLLOW-UP APPOINTMENT: 10/3     1. PET of the neck, chest, abdomen, and pelvis.  2. PET CT Fusion for Attenuation Correction and Anatomical  Localization.  3. Diagnostic CT of the chest, abdomen and pelvis with intravenous  contrast obtained for diagnostic interpretation.  4. 3D MIP and PET-CT fused images were processed on an independent  workstation and archived to PACS and reviewed by a radiologist.     Technique:     1. PET: The patient received 10.08 mCi of F-18-FDG. The serum glucose  was 98 mg/dL prior to administration. Body weight was 74.8 kg. Images  were evaluated in the  axial, sagittal, and coronal planes as well as  the rotational whole body MIP. Images were acquired from cranial  vertex to feet.     UPTAKE WAS MEASURED AT 63 MINUTES.      2. CT: Volumetric acquisition for clinical interpretation of the  chest, abdomen, and pelvis acquired at 3 mm sections. The chest,  abdomen, and pelvis were evaluated at 5 mm sections in bone, soft  tissue, and lung windows. High resolution images of the neck were  obtained with multiple oblique projection reformats.      Contrast and Medications:  IV contrast: 101 mL of Isovue 370 intravenously.  PO contrast: 500 mL of water.  Additional Medications: None.     3. 3D MIP and PET-CT fused images were processed on an independent  workstation and archived to PACS and reviewed by a radiologist.     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     ADDITIONAL INFORMATION OBTAINED FROM EMR: 54-year-old patient  diagnosed with follicular lymphoma after finding the lump in the neck.  Currently on R-CHOP, with complete metabolic response on prior PET/CT.     COMPARISON: Prior PET/CT of 7/29/2024 and 6/10/2024.     FINDINGS:      BACKGROUND: Liver SUV max = 3.40, Aorta Blood SUV max = 2.58.      HEAD/NECK:     Lymph nodes: No FDG avid or abnormally enlarged lymph nodes.  Previously involved left level IIb lymph node is measuring 1.0 x 0.5  (4/132), with metabolic activity similar to background (Deauville 1)     Pharyngeal mucosal spaces: There is mild asymmetrical uptake of the  left palatine tonsil (maximum SUV 7.1), this is highly nonspecific in  patients under chemotherapy. Other tonsils are within normal limits.  Tongue base is normal. Oral tongue and buccal mucosa of the oral  cavity is normal. Oropharynx, hypopharynx and larynx are within normal  limits.     Sinonasal region: Paranasal sinuses are clear. No mass within the  nasal cavity.  Salivary glands: The major salivary glands are within normal limits.   Thyroid gland: The  thyroid gland is within normal limits.   Vascular structures: The major vasculature of the neck are patent.  Brain: No abnormal FDG avid lesion or abnormal enhancement.   Orbital cavities: No abnormal FDG avid lesion or abnormal enhancement.        CHEST:     Lymph nodes: Calcified right hilar lymph node without metabolic  activity. Metabolic activity within the previously metabolic  subcarinal lymph node is at background levels (Deauville 1). Note that  the prior uptake was indeterminate and could represent a reactive  node.     Lungs:Mild subpleural scarring in the lung apices.  The central  tracheobronchial tree is clear. No acute consolidation. Tiny calcified  granuloma of the right lower lobe (7/84). No pleural effusion or  pneumothorax.      Heart and great vessels: Heart size is within normal limits. No  pericardial effusion. The thoracic aorta and main pulmonary artery are  within normal limits. The esophagus is unremarkable.      Breasts: No abnormal uptake in the breasts.     ABDOMEN AND PELVIS:     Liver: No FDG avid lesion.Similar hypoattenuating lesions and  geographic steatosis. No intrahepatic or extrahepatic biliary ductal  dilatation. Gallbladder is within normal limits.   Pancreas: The pancreas is within normal limits. No pancreatic ductal  dilatation.   Spleen: No FDG avid lesion.  Adrenal glands: No FDG avid foci.  Kidneys: No FDG avid lesion. No hydronephrosis. The urinary bladder is  unremarkable. Bilateral renal cysts.     Prostatomegaly with prostatic parenchymal calcifications. Pelvic  phleboliths. Mild perianal uptake, which may represent inflamed  hemorrhoids.  Stable right-sided mild hydrocele.  Gastrointestinal system: Normal caliber of the small and large bowel.   Lymph nodes: No FDG avid or abnormally enlarged lymph nodes.  Vascular structures: Normal caliber of the abdominal aorta.  No free air, free fluid, or fluid collection.      EXTREMITIES:      No abnormal FDG uptake in the  visualized extremities.     BONES AND SOFT TISSUES:      Persistent mild uptake associated with similar appearing 0.5 cm well  marginated subcortical lytic lesion in posterior right 8th rib (3/265)  with SUV max 3.7, previously 3.9.  No other abnormal FDG uptake in the skeleton     Surgical screws in medial right femoral condyle, possibly prior MCL  reconstruction. Degenerative changes in right knee with small joint  effusion and intra-articular calcification with continued mild  inflammatory uptake. Avascular necrosis appearance of the right  femoral head with subchondral cystic and sclerotic changes, without  collapse.    Moderate scoliotic curvature of the thoracolumbar spine,  and multilevel degenerative changes in the spine.   No new lytic or blastic osseous lesions.      SPINAL CANAL:      No evident canal compromise. No abnormal FDG avid lesion.                                                                         IMPRESSION: Patient with follicular lymphoma after finding the lump in  the neck. Currently on R-CHOP.  1. Indeterminate new uptake within the left palatine tonsil (Deauville  5). Inflammatory versus lymphoma.  2. Otherwise no concerning hypermetabolic lesions.  3. Similar appearing mildly hypermetabolic subcentimeter lytic lesion  in right posterior eighth rib, likely unrelated to lymphoma.           ASSESSMENT/PLAN:   ## H/o subclinical hyperthyroidism, most likely from Graves' Disease  ## Follicular large B-cell lymphoma s/p R-CHOP  ## Afib  For thyrotoxicosis, thyroid uptake and scan showed diffuse I123 uptake 46% 5/2024.  Previously not interested in treatment for Graves' disease. Recheck labs 8/2024, thyroid function test was normal.  Now with diagnosed Afib.  Today, we discussed that hyperthyroid can cause Afib. Will recheck his labs to see if Graves' disease is active or not  If patient has active Graves', we discussed options for medication, MURRIETA and surgery, patient prefers  medication.  Discussed about methimazole.  -- labs to [place]   -- I will touchbase cardiology team about thyroid condition    Follow-up 3-4 months    The longitudinal plan of care for the diagnosis(es)/condition(s) as documented were addressed during this visit. Due to the added complexity in care, I will continue to support Quincy in the subsequent management and with ongoing continuity of care.    Cedric Lopez MD           Again, thank you for allowing me to participate in the care of your patient.        Sincerely,        Cedric Lopez MD

## 2024-12-11 ENCOUNTER — TELEPHONE (OUTPATIENT)
Dept: CARDIOLOGY | Facility: CLINIC | Age: 64
End: 2024-12-11

## 2024-12-11 NOTE — TELEPHONE ENCOUNTER
"DCCV is canceled - message from scheduling team  *pt was accidentally scheduled at the same time as a LES. Pt advised that he was planning on calling us to cancel this Cardioversion due to waiting to hear back from his \"Thyroid doctor\" in regards to when this can be done. Pt is aware H&P may need to be redone.* 12/10/24 AML     Per Endocrinology note 12/9/2024:  For thyrotoxicosis, thyroid uptake and scan showed diffuse I123 uptake 46% 5/2024.  Previously not interested in treatment for Graves' disease. Recheck labs 8/2024, thyroid function test was normal.  Now with diagnosed Afib.  Today, we discussed that hyperthyroid can cause Afib. Will recheck his labs to see if Graves' disease is active or not  If patient has active Graves', we discussed options for medication, MURRIETA and surgery, patient prefers medication.  Discussed about methimazole.  -- labs to [place]   -- I will touchbase cardiology team about thyroid condition     Thyroid lab work is not visible in Epic system          "

## 2024-12-12 ENCOUNTER — LAB (OUTPATIENT)
Dept: LAB | Facility: CLINIC | Age: 64
End: 2024-12-12
Payer: COMMERCIAL

## 2024-12-12 DIAGNOSIS — E05.00 GRAVES DISEASE: ICD-10-CM

## 2024-12-12 LAB
ALBUMIN SERPL BCG-MCNC: 4.3 G/DL (ref 3.5–5.2)
ALP SERPL-CCNC: 70 U/L (ref 40–150)
ALT SERPL W P-5'-P-CCNC: 14 U/L (ref 0–70)
AST SERPL W P-5'-P-CCNC: 24 U/L (ref 0–45)
BILIRUB DIRECT SERPL-MCNC: <0.2 MG/DL (ref 0–0.3)
BILIRUB SERPL-MCNC: 0.3 MG/DL
PROT SERPL-MCNC: 7.2 G/DL (ref 6.4–8.3)
TSH SERPL DL<=0.005 MIU/L-ACNC: 0.94 UIU/ML (ref 0.3–4.2)
WBC # BLD AUTO: 5.2 10E3/UL (ref 4–11)

## 2024-12-12 PROCEDURE — 84445 ASSAY OF TSI GLOBULIN: CPT | Mod: 90 | Performed by: PATHOLOGY

## 2024-12-12 PROCEDURE — 84443 ASSAY THYROID STIM HORMONE: CPT | Performed by: PATHOLOGY

## 2024-12-12 PROCEDURE — 99000 SPECIMEN HANDLING OFFICE-LAB: CPT | Performed by: PATHOLOGY

## 2024-12-12 PROCEDURE — 36415 COLL VENOUS BLD VENIPUNCTURE: CPT | Performed by: PATHOLOGY

## 2024-12-12 PROCEDURE — 85048 AUTOMATED LEUKOCYTE COUNT: CPT | Performed by: PATHOLOGY

## 2024-12-12 PROCEDURE — 83520 IMMUNOASSAY QUANT NOS NONAB: CPT | Mod: 90 | Performed by: PATHOLOGY

## 2024-12-12 PROCEDURE — 80076 HEPATIC FUNCTION PANEL: CPT | Performed by: PATHOLOGY

## 2024-12-14 LAB — TSH RECEP AB SER-ACNC: <1.1 IU/L (ref 0–1.75)

## 2024-12-18 LAB — TSI SER-ACNC: <1 TSI INDEX

## 2025-01-30 ENCOUNTER — ONCOLOGY VISIT (OUTPATIENT)
Dept: ONCOLOGY | Facility: CLINIC | Age: 65
End: 2025-01-30
Attending: STUDENT IN AN ORGANIZED HEALTH CARE EDUCATION/TRAINING PROGRAM
Payer: COMMERCIAL

## 2025-01-30 ENCOUNTER — APPOINTMENT (OUTPATIENT)
Dept: LAB | Facility: CLINIC | Age: 65
End: 2025-01-30
Attending: STUDENT IN AN ORGANIZED HEALTH CARE EDUCATION/TRAINING PROGRAM
Payer: COMMERCIAL

## 2025-01-30 VITALS
OXYGEN SATURATION: 99 % | TEMPERATURE: 98 F | BODY MASS INDEX: 24.37 KG/M2 | HEART RATE: 60 BPM | DIASTOLIC BLOOD PRESSURE: 77 MMHG | SYSTOLIC BLOOD PRESSURE: 125 MMHG | RESPIRATION RATE: 16 BRPM | WEIGHT: 165 LBS

## 2025-01-30 DIAGNOSIS — R94.6 ABNORMAL THYROID FUNCTION TEST: ICD-10-CM

## 2025-01-30 DIAGNOSIS — C82.41 GRADE 3B FOLLICULAR LYMPHOMA OF LYMPH NODES OF NECK (H): ICD-10-CM

## 2025-01-30 LAB
ALBUMIN SERPL BCG-MCNC: 4.4 G/DL (ref 3.5–5.2)
ALP SERPL-CCNC: 62 U/L (ref 40–150)
ALT SERPL W P-5'-P-CCNC: 17 U/L (ref 0–70)
ANION GAP SERPL CALCULATED.3IONS-SCNC: 10 MMOL/L (ref 7–15)
AST SERPL W P-5'-P-CCNC: 31 U/L (ref 0–45)
BASOPHILS # BLD AUTO: 0.1 10E3/UL (ref 0–0.2)
BASOPHILS NFR BLD AUTO: 1 %
BILIRUB SERPL-MCNC: 0.7 MG/DL
BUN SERPL-MCNC: 24.3 MG/DL (ref 8–23)
CALCIUM SERPL-MCNC: 9.3 MG/DL (ref 8.8–10.4)
CHLORIDE SERPL-SCNC: 102 MMOL/L (ref 98–107)
CREAT SERPL-MCNC: 0.83 MG/DL (ref 0.67–1.17)
EGFRCR SERPLBLD CKD-EPI 2021: >90 ML/MIN/1.73M2
EOSINOPHIL # BLD AUTO: 0.2 10E3/UL (ref 0–0.7)
EOSINOPHIL NFR BLD AUTO: 4 %
ERYTHROCYTE [DISTWIDTH] IN BLOOD BY AUTOMATED COUNT: 13.6 % (ref 10–15)
GLUCOSE SERPL-MCNC: 84 MG/DL (ref 70–99)
HCO3 SERPL-SCNC: 27 MMOL/L (ref 22–29)
HCT VFR BLD AUTO: 40.2 % (ref 40–53)
HGB BLD-MCNC: 13.6 G/DL (ref 13.3–17.7)
IMM GRANULOCYTES # BLD: 0 10E3/UL
IMM GRANULOCYTES NFR BLD: 0 %
LDH SERPL L TO P-CCNC: 202 U/L (ref 0–250)
LYMPHOCYTES # BLD AUTO: 0.5 10E3/UL (ref 0.8–5.3)
LYMPHOCYTES NFR BLD AUTO: 10 %
MCH RBC QN AUTO: 30.8 PG (ref 26.5–33)
MCHC RBC AUTO-ENTMCNC: 33.8 G/DL (ref 31.5–36.5)
MCV RBC AUTO: 91 FL (ref 78–100)
MONOCYTES # BLD AUTO: 0.6 10E3/UL (ref 0–1.3)
MONOCYTES NFR BLD AUTO: 11 %
NEUTROPHILS # BLD AUTO: 3.6 10E3/UL (ref 1.6–8.3)
NEUTROPHILS NFR BLD AUTO: 73 %
NRBC # BLD AUTO: 0 10E3/UL
NRBC BLD AUTO-RTO: 0 /100
PLATELET # BLD AUTO: 221 10E3/UL (ref 150–450)
POTASSIUM SERPL-SCNC: 4.3 MMOL/L (ref 3.4–5.3)
PROT SERPL-MCNC: 7.2 G/DL (ref 6.4–8.3)
RBC # BLD AUTO: 4.41 10E6/UL (ref 4.4–5.9)
SODIUM SERPL-SCNC: 139 MMOL/L (ref 135–145)
TSH SERPL DL<=0.005 MIU/L-ACNC: 1.3 UIU/ML (ref 0.3–4.2)
URATE SERPL-MCNC: 4.6 MG/DL (ref 3.4–7)
WBC # BLD AUTO: 5 10E3/UL (ref 4–11)

## 2025-01-30 PROCEDURE — 85004 AUTOMATED DIFF WBC COUNT: CPT | Performed by: STUDENT IN AN ORGANIZED HEALTH CARE EDUCATION/TRAINING PROGRAM

## 2025-01-30 PROCEDURE — 84550 ASSAY OF BLOOD/URIC ACID: CPT | Performed by: STUDENT IN AN ORGANIZED HEALTH CARE EDUCATION/TRAINING PROGRAM

## 2025-01-30 PROCEDURE — 83615 LACTATE (LD) (LDH) ENZYME: CPT | Performed by: STUDENT IN AN ORGANIZED HEALTH CARE EDUCATION/TRAINING PROGRAM

## 2025-01-30 PROCEDURE — 84075 ASSAY ALKALINE PHOSPHATASE: CPT | Performed by: STUDENT IN AN ORGANIZED HEALTH CARE EDUCATION/TRAINING PROGRAM

## 2025-01-30 PROCEDURE — 36415 COLL VENOUS BLD VENIPUNCTURE: CPT | Performed by: STUDENT IN AN ORGANIZED HEALTH CARE EDUCATION/TRAINING PROGRAM

## 2025-01-30 PROCEDURE — 84443 ASSAY THYROID STIM HORMONE: CPT | Performed by: STUDENT IN AN ORGANIZED HEALTH CARE EDUCATION/TRAINING PROGRAM

## 2025-01-30 PROCEDURE — 84155 ASSAY OF PROTEIN SERUM: CPT | Performed by: STUDENT IN AN ORGANIZED HEALTH CARE EDUCATION/TRAINING PROGRAM

## 2025-01-30 PROCEDURE — 85014 HEMATOCRIT: CPT | Performed by: STUDENT IN AN ORGANIZED HEALTH CARE EDUCATION/TRAINING PROGRAM

## 2025-01-30 ASSESSMENT — PAIN SCALES - GENERAL: PAINLEVEL_OUTOF10: NO PAIN (0)

## 2025-01-30 NOTE — PROGRESS NOTES
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 Vitals taken. Checked in for appointment(s). )    [facility]     Oncology History Overview Note   This is a 62 y/o male with PMH of atrial fibrillation, thyroid dysfunction and probable dermatitis, comes in with newly diagnosed follicular lymphoma.     He was otherwise doing well until he developed neck lump on the left side about 3 months ago. No fevers, chills, night sweat or weight loss. He was asymptomatic from the lymph node. Initial needle biopsy showed atypical lymphoid infiltrate. Subsequent core needle biopsy showed follicular lymphoma grade 3B. IGH/BCL2 rearrangement was positive. No BCL6 or MYC rearrangement. He has not noticed any lumps anywhere else. PET scan showed hypermetabolic left upper cervical lymph ndoes and subcarinal lymph node. Overall stage II, non bulky disease. LDH within normal limits. smIPI at least 2.   EF >50%  He has rate controlled atrial fibrillation.     He comes today to [facility] follow up. He began RCHOP on 6/2024. PET scan after 2 cycles showed near CR. He continued RCHOP for 2 more cycles. PET scan after 4 cycles of RCHOP showed CR. I discussed total 6 cycles of RCHOP considering stage II disease but patient wished to receive less chemotherapy and declined last 2 cycles. 4 cycles of RCHOP may be adequate to control DLBCL in this case as patient has minimal disease burden and has excellent response to treatment. We determined 4 cycles of RCHOP was appropriate. End of treatment PET on 10/2024 showed CR.     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 He is feeling well. He has discussing cardioversion for his afib with cardiology. Denies pain, hypotension, palpitations, fevers, chills, new lumps/bumps, night sweats, weight loss, or lymphadenopathy.         Objective    /77   Pulse 60   Temp 98  F (36.7  C)   Resp 16   Wt 74.8 kg (165 lb)   SpO2 99%   BMI 24.37 kg/m    Body mass index is 24.37 kg/m .  Physical Exam   GENERAL:  Alert oriented well nourished, no acute distress   HEAD: normocephalic atraumatic, EOMI, anicteric sclera   SKIN:  no rash, hives, other lesions.  LYMPHATIC: no abnormal lymph nodes palable in cervical, axillary, or supraclavicular area  RESP:  No rales or rhonchi, breath sounds bilaterally equal and vesicular  CV:  No tachycardia, irregular rate and irregular rhythm   GI:  Abdomen soft nontender no hepato or splenomegaly  MUSCULOSKELETAL:  No visible joint redness or swelling.  NEURO:  No gross weakness gait normal  PSYCH: pleasant affect    Labs: I personally reviewed complete blood count, differential, renal function test, liver function tests LDH.  No cytopenias. LFTs, renal function, and LDH are within normal limits.     Imaging: no new imaging     Assessment and Plan:    1) Follicular lymphoma grade 3b  Patient continues to remain in remission with no clinical signs of progression. We will continue surveillance. We will see him every 3 months. We discussed imaging every 6 months for the first two year, however patient does not want to obtain imaging. We will continue monitoring based on physical examination and labs.     2) Atrial fibrillation   - continue metoprolol and Xarelto, managed by Cardiology       Total time spent on date of service in review of medical records, review of labs, history taking, physical exam, discussion of assessment and plan, counseling and patient education is 40 minutes.    Patient discussed with Dr. Peñaloza      Maxine Peñaloza MD  Heme/Onc Fellow

## 2025-01-30 NOTE — NURSING NOTE
Chief Complaint   Patient presents with    Oncology Clinic Visit     Grade 3b follicular lymphoma of lymph nodes of neck    Blood Draw     Labs collected from venipuncture by RN. Vitals taken. Checked in for appointment(s).      Labs collected from venipuncture by RN. Vitals taken. Checked in for appointment(s).     Catia Santana RN

## 2025-01-30 NOTE — Clinical Note
1/30/2025      Quincy Keane  [address]  [place] [address]      Dear Colleague,    Thank you for referring your patient, Quincy Keane, to the [facility] CANCER [facility]. Please see a copy of my visit note below.    No notes on file    Again, thank you for allowing me to participate in the care of your patient.        Sincerely,        Avelina Peñaloza MD    Electronically signed

## 2025-01-30 NOTE — NURSING NOTE
"Oncology Rooming Note    January 30, 2025 12:37 PM   Quincy Keane is a 64 year old male who presents for:    Chief Complaint   Patient presents with    Oncology Clinic Visit     Grade 3b follicular lymphoma of lymph nodes of neck    Blood Draw     Labs collected from venipuncture by RN. Vitals taken. Checked in for appointment(s).      Initial Vitals: /77   Pulse 60   Temp 98  F (36.7  C)   Resp 16   Wt 74.8 kg (165 lb)   SpO2 99%   BMI 24.37 kg/m   Estimated body mass index is 24.37 kg/m  as calculated from the following:    Height as of 12/4/24: 1.753 m (5' 9\").    Weight as of this encounter: 74.8 kg (165 lb). Body surface area is 1.91 meters squared.  No Pain (0) Comment: Data Unavailable   No LMP for male patient.  Allergies reviewed: Yes  Medications reviewed: Yes    Medications: Medication refills not needed today.  Pharmacy name entered into [employer]:    [id number] - [place], [place] - [address] [place] [address]. [facility] [place], [place] - [phone]    Frailty Screening:   Is the patient here for a new oncology consult visit in cancer care? 2. No      Clinical concerns: Patient states no new concerns to discuss with provider.        Johana Orozco, EMT            "

## 2025-02-11 DIAGNOSIS — E05.00 GRAVES DISEASE: Primary | ICD-10-CM

## 2025-02-26 ENCOUNTER — TELEPHONE (OUTPATIENT)
Dept: CARDIOLOGY | Facility: CLINIC | Age: 65
End: 2025-02-26
Payer: COMMERCIAL

## 2025-02-26 NOTE — TELEPHONE ENCOUNTER
1st attempt- Left voicemail for the patient to call back and schedule the following:    Appointment type:  Return Cardiology  Provider:  Dr Carter  Return date:  routine, in the next few weeks  Additional appointment(s) needed:    Additonal Notes:  Pt requested to see Miri- Miri is on leave- there is nothing sooner because she wont be back until June. Pt is welcome to see Kailey Floyd or Dr Carter   Specialty phone number: [phone]

## 2025-03-09 DIAGNOSIS — E05.00 GRAVES DISEASE: Primary | ICD-10-CM

## 2025-04-21 ENCOUNTER — MYC REFILL (OUTPATIENT)
Dept: CARDIOLOGY | Facility: CLINIC | Age: 65
End: 2025-04-21
Payer: COMMERCIAL

## 2025-04-21 DIAGNOSIS — I48.91 NEW ONSET ATRIAL FIBRILLATION (H): ICD-10-CM

## 2025-04-22 ENCOUNTER — TELEPHONE (OUTPATIENT)
Dept: CARDIOLOGY | Facility: CLINIC | Age: 65
End: 2025-04-22
Payer: COMMERCIAL

## 2025-04-22 NOTE — TELEPHONE ENCOUNTER
2nd attempt- Left voicemail for the patient to call back and schedule the following:    Appointment type:  Return Cardiology  Provider:  Miri  Return date: routine/now   Additional appointment(s) needed:  MARIBEL appt to discuss H+P for possible DCCV  Additional Notes:    Specialty phone number: [phone]

## 2025-04-22 NOTE — TELEPHONE ENCOUNTER
[facility] Call Center    Phone Message    May a detailed message be left on voicemail: yes     Reason for Call: Other: Patient has further questions on cardioversion procedure. Please call him back to discuss.      Action Taken: Other: cardiology    Travel Screening: Not Applicable     Date of Service:

## 2025-04-22 NOTE — TELEPHONE ENCOUNTER
Refill request received for patients xarelto. Patient was supposed to have a cardioversion in December though this was scheduled incorrectly/cancelled and not rescheduled. Current follow up is scheduled for June. Per notes patient was working with endocrinology for Graves disease, labs normal, medication not required.     Called patient, he says he is not really aware of his afib apart from feeling fatigued. He denies any shortness of breath, chest pain, lightheadedness/dizziness. He does not monitor his vital signs. He has not been taking his xarelto/metoprolol consistently. Reviewed that he needs to be compliant with medication in order to proceed with procedure. He was agreeable to moving up his follow up to discuss further. Scheduling messaged to arrange. Refill sent for xarelto.

## 2025-04-22 NOTE — TELEPHONE ENCOUNTER
Patient was wondering if his PCP could do the H&P for cardioversion. Reviewed that he has to see cardiology for this.  He is hoping scheduling will call soon so he can plan a trip this summer.    Reviewed with patient that he must stay on his daily xarelto with no skips between now and a DCCV procedure. Patient states he has been good a full week so far.

## 2025-04-26 DIAGNOSIS — E05.00 GRAVES DISEASE: Primary | ICD-10-CM

## 2025-04-26 NOTE — PROGRESS NOTES
His TFT was normal 12/2024 plan Follow-up labs 3-4 months. He is due for labs.   To make sure he is not in hyperthyroidism which can increase risk of recurrent Afib, I will have our clinic contact patient gets labs TSH with reflex (ordered).     Endocrine team, please have patient has a follow up lab (ordered)

## 2025-05-08 ENCOUNTER — ONCOLOGY VISIT (OUTPATIENT)
Dept: ONCOLOGY | Facility: CLINIC | Age: 65
End: 2025-05-08
Attending: STUDENT IN AN ORGANIZED HEALTH CARE EDUCATION/TRAINING PROGRAM
Payer: COMMERCIAL

## 2025-05-08 ENCOUNTER — APPOINTMENT (OUTPATIENT)
Dept: LAB | Facility: CLINIC | Age: 65
End: 2025-05-08
Attending: STUDENT IN AN ORGANIZED HEALTH CARE EDUCATION/TRAINING PROGRAM
Payer: COMMERCIAL

## 2025-05-08 VITALS
WEIGHT: 167 LBS | RESPIRATION RATE: 16 BRPM | OXYGEN SATURATION: 100 % | SYSTOLIC BLOOD PRESSURE: 115 MMHG | HEART RATE: 64 BPM | TEMPERATURE: 97.9 F | BODY MASS INDEX: 24.66 KG/M2 | DIASTOLIC BLOOD PRESSURE: 72 MMHG

## 2025-05-08 DIAGNOSIS — E05.00 GRAVES DISEASE: ICD-10-CM

## 2025-05-08 DIAGNOSIS — C82.41 GRADE 3B FOLLICULAR LYMPHOMA OF LYMPH NODES OF NECK (H): ICD-10-CM

## 2025-05-08 LAB
ALBUMIN SERPL BCG-MCNC: 4.2 G/DL (ref 3.5–5.2)
ALP SERPL-CCNC: 58 U/L (ref 40–150)
ALT SERPL W P-5'-P-CCNC: 18 U/L (ref 0–70)
ANION GAP SERPL CALCULATED.3IONS-SCNC: 9 MMOL/L (ref 7–15)
AST SERPL W P-5'-P-CCNC: 28 U/L (ref 0–45)
BASOPHILS # BLD AUTO: 0 10E3/UL (ref 0–0.2)
BASOPHILS NFR BLD AUTO: 1 %
BILIRUB SERPL-MCNC: 0.5 MG/DL
BUN SERPL-MCNC: 21.5 MG/DL (ref 8–23)
CALCIUM SERPL-MCNC: 9.2 MG/DL (ref 8.8–10.4)
CHLORIDE SERPL-SCNC: 104 MMOL/L (ref 98–107)
CREAT SERPL-MCNC: 0.88 MG/DL (ref 0.67–1.17)
EGFRCR SERPLBLD CKD-EPI 2021: >90 ML/MIN/1.73M2
EOSINOPHIL # BLD AUTO: 0.2 10E3/UL (ref 0–0.7)
EOSINOPHIL NFR BLD AUTO: 5 %
ERYTHROCYTE [DISTWIDTH] IN BLOOD BY AUTOMATED COUNT: 13.1 % (ref 10–15)
GLUCOSE SERPL-MCNC: 96 MG/DL (ref 70–99)
HCO3 SERPL-SCNC: 27 MMOL/L (ref 22–29)
HCT VFR BLD AUTO: 39.4 % (ref 40–53)
HGB BLD-MCNC: 13.3 G/DL (ref 13.3–17.7)
IMM GRANULOCYTES # BLD: 0 10E3/UL
IMM GRANULOCYTES NFR BLD: 0 %
LDH SERPL L TO P-CCNC: 190 U/L (ref 0–250)
LYMPHOCYTES # BLD AUTO: 0.5 10E3/UL (ref 0.8–5.3)
LYMPHOCYTES NFR BLD AUTO: 11 %
MCH RBC QN AUTO: 31.4 PG (ref 26.5–33)
MCHC RBC AUTO-ENTMCNC: 33.8 G/DL (ref 31.5–36.5)
MCV RBC AUTO: 93 FL (ref 78–100)
MONOCYTES # BLD AUTO: 0.5 10E3/UL (ref 0–1.3)
MONOCYTES NFR BLD AUTO: 11 %
NEUTROPHILS # BLD AUTO: 3.1 10E3/UL (ref 1.6–8.3)
NEUTROPHILS NFR BLD AUTO: 72 %
NRBC # BLD AUTO: 0 10E3/UL
NRBC BLD AUTO-RTO: 0 /100
PLATELET # BLD AUTO: 191 10E3/UL (ref 150–450)
POTASSIUM SERPL-SCNC: 4.8 MMOL/L (ref 3.4–5.3)
PROT SERPL-MCNC: 6.7 G/DL (ref 6.4–8.3)
RBC # BLD AUTO: 4.24 10E6/UL (ref 4.4–5.9)
SODIUM SERPL-SCNC: 140 MMOL/L (ref 135–145)
TSH SERPL DL<=0.005 MIU/L-ACNC: 0.79 UIU/ML (ref 0.3–4.2)
URATE SERPL-MCNC: 4.6 MG/DL (ref 3.4–7)
WBC # BLD AUTO: 4.3 10E3/UL (ref 4–11)

## 2025-05-08 PROCEDURE — 83615 LACTATE (LD) (LDH) ENZYME: CPT | Performed by: STUDENT IN AN ORGANIZED HEALTH CARE EDUCATION/TRAINING PROGRAM

## 2025-05-08 PROCEDURE — 99213 OFFICE O/P EST LOW 20 MIN: CPT | Performed by: STUDENT IN AN ORGANIZED HEALTH CARE EDUCATION/TRAINING PROGRAM

## 2025-05-08 PROCEDURE — 84443 ASSAY THYROID STIM HORMONE: CPT | Performed by: STUDENT IN AN ORGANIZED HEALTH CARE EDUCATION/TRAINING PROGRAM

## 2025-05-08 PROCEDURE — 85004 AUTOMATED DIFF WBC COUNT: CPT | Performed by: STUDENT IN AN ORGANIZED HEALTH CARE EDUCATION/TRAINING PROGRAM

## 2025-05-08 PROCEDURE — 84550 ASSAY OF BLOOD/URIC ACID: CPT | Performed by: STUDENT IN AN ORGANIZED HEALTH CARE EDUCATION/TRAINING PROGRAM

## 2025-05-08 PROCEDURE — 84450 TRANSFERASE (AST) (SGOT): CPT | Performed by: STUDENT IN AN ORGANIZED HEALTH CARE EDUCATION/TRAINING PROGRAM

## 2025-05-08 PROCEDURE — 36415 COLL VENOUS BLD VENIPUNCTURE: CPT | Performed by: STUDENT IN AN ORGANIZED HEALTH CARE EDUCATION/TRAINING PROGRAM

## 2025-05-08 ASSESSMENT — PAIN SCALES - GENERAL: PAINLEVEL_OUTOF10: NO PAIN (0)

## 2025-05-08 NOTE — NURSING NOTE
"Oncology Rooming Note    May 8, 2025 1:17 PM   Quincy Keane is a 64 year old male who presents for:    Chief Complaint   Patient presents with    Blood Draw     Labs drawn via  by RN in lab. VS taken.     Oncology Clinic Visit     lymphoma of lymph nodes of neck     Initial Vitals: /72 (BP Location: Right arm, Patient Position: Sitting, Cuff Size: Adult Regular)   Pulse 64   Temp 97.9  F (36.6  C) (Oral)   Resp 16   Wt 75.8 kg (167 lb)   SpO2 100%   BMI 24.66 kg/m   Estimated body mass index is 24.66 kg/m  as calculated from the following:    Height as of 4/25/25: 1.753 m (5' 9\").    Weight as of this encounter: 75.8 kg (167 lb). Body surface area is 1.92 meters squared.  No Pain (0) Comment: Data Unavailable   No LMP for male patient.    Allergies reviewed: Yes  Medications reviewed: Yes    Medications: Medication refills not needed today.  Pharmacy name entered into [employer]:    [id number] [place], [place] - [address]  [facility] [place], [place] - [phone]    Frailty Screening:   Is the patient here for a new oncology consult visit in cancer care? 2. No    PHQ9:  Did this patient require a PHQ9?: No      Clinical concerns: none.      Bill Cabrera"

## 2025-05-08 NOTE — LETTER
5/8/2025      Quincy Keane  [address]  [place] [address]      Dear Colleague,    Thank you for referring your patient, Quincy Keane, to the [facility]. Please see a copy of my visit note below.    Judi Mathew is a 64 year old, presenting for the following health issues:  Blood Draw (Labs drawn via  by RN in lab. VS taken. ) and Oncology Clinic Visit (lymphoma of lymph nodes of neck)    HPI      Oncology History Overview Note   This is a 64 y/o male with PMH of atrial fibrillation, thyroid dysfunction and probable dermatitis, comes in with newly diagnosed follicular lymphoma.     He was otherwise doing well until he developed neck lump on the left side about 3 months ago. No fevers, chills, night sweat or weight loss. He was asymptomatic from the lymph node. Initial needle biopsy showed atypical lymphoid infiltrate. Subsequent core needle biopsy showed follicular lymphoma grade 3B. IGH/BCL2 rearrangement was positive. No BCL6 or MYC rearrangement. He has not noticed any lumps anywhere else. PET scan showed hypermetabolic left upper cervical lymph ndoes and subcarinal lymph node. Overall stage II, non bulky disease. LDH within normal limits. smIPI at least 2.   EF >50%  He has rate controlled atrial fibrillation.     He comes today to [facility] follow up. He began RCHOP on 6/2024. PET scan after 2 cycles showed near CR. He continued RCHOP for 2 more cycles. PET scan after 4 cycles of RCHOP showed CR. I discussed total 6 cycles of RCHOP considering stage II disease but patient wished to receive less chemotherapy and declined last 2 cycles. 4 cycles of RCHOP may be adequate to control DLBCL in this case as patient has minimal disease burden and has excellent response to treatment. We determined 4 cycles of RCHOP was appropriate. End of treatment PET on 10/2024 showed CR.     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 No fevers, chills, night sweats or weight loss, no lymphadenopathy. No pain.        Objective   /72 (BP Location: Right arm, Patient Position: Sitting, Cuff Size: Adult Regular)   Pulse 64   Temp 97.9  F (36.6  C) (Oral)   Resp 16   Wt 75.8 kg (167 lb)   SpO2 100%   BMI 24.66 kg/m    Body mass index is 24.66 kg/m .  Physical Exam   GENERAL:  Alert oriented well nourished  HEAD: normocephalic atraumatic  SKIN:  no rash, hives, other lesions.  LYMPHATIC: no abnormal lymph nodes palable in cervical, axillary, supraclavicular or inguinal area.  RESP:  No rales or rhonchi, breath sounds bilaterally equal and vesicular  CV:  No tachycardia, S1 S2 normal No murmur.  GI:  Abdomen soft nontender no hepato or splenomegaly  MUSCULOSKELETAL:  No visible joint redness or swelling.  NEURO:  No gross weakness gait normal  PSYCH: pleasant affect    Labs: I personally reviewed complete blood count, differential, renal function test, liver function tests LDH.  No cytopenias, LFTs within normal limits, renal function test within normal limits and LDH is normal as well.    Assessment and Plan:    1) Follicular lymphoma 3B:  - He has no evidence of lymphoma recurrence or progression.  - I will see him in 4 months and labs.    Total time spent on date of service in review of medical records, review of labs, history taking, physical exam, discussion of assessment and plan, counseling and patient education is 30 minutes.    Avelina Peñaloza MD  Attending Physician  Pager [phone]            Signed Electronically by: Avelina Peñaloza MD      Again, thank you for allowing me to participate in the care of your patient.        Sincerely,        Avelina Peñaloza,  MD    Electronically signed

## 2025-05-08 NOTE — NURSING NOTE
Chief Complaint   Patient presents with    Blood Draw     Labs drawn via  by RN in lab. VS taken.      Labs collected from venipuncture by RN. Vitals taken. Checked in for appointment(s).    Kassi Maxwell RN

## 2025-05-08 NOTE — PROGRESS NOTES
Judi Mathew is a 64 year old, presenting for the following health issues:  Blood Draw (Labs drawn via  by RN in lab. VS taken. ) and Oncology Clinic Visit (lymphoma of lymph nodes of neck)    HPI      Oncology History Overview Note   This is a 64 y/o male with PMH of atrial fibrillation, thyroid dysfunction and probable dermatitis, comes in with newly diagnosed follicular lymphoma.     He was otherwise doing well until he developed neck lump on the left side about 3 months ago. No fevers, chills, night sweat or weight loss. He was asymptomatic from the lymph node. Initial needle biopsy showed atypical lymphoid infiltrate. Subsequent core needle biopsy showed follicular lymphoma grade 3B. IGH/BCL2 rearrangement was positive. No BCL6 or MYC rearrangement. He has not noticed any lumps anywhere else. PET scan showed hypermetabolic left upper cervical lymph ndoes and subcarinal lymph node. Overall stage II, non bulky disease. LDH within normal limits. smIPI at least 2.   EF >50%  He has rate controlled atrial fibrillation.     He comes today to [facility] follow up. He began RCHOP on 6/2024. PET scan after 2 cycles showed near CR. He continued RCHOP for 2 more cycles. PET scan after 4 cycles of RCHOP showed CR. I discussed total 6 cycles of RCHOP considering stage II disease but patient wished to receive less chemotherapy and declined last 2 cycles. 4 cycles of RCHOP may be adequate to control DLBCL in this case as patient has minimal disease burden and has excellent response to treatment. We determined 4 cycles of RCHOP was appropriate. End of treatment PET on 10/2024 showed CR.     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 No fevers, chills, night sweats or weight loss, no lymphadenopathy. No pain.        Objective    /72 (BP Location: Right arm, Patient Position: Sitting, Cuff Size: Adult Regular)   Pulse 64   Temp 97.9  F (36.6  C) (Oral)   Resp 16   Wt 75.8 kg (167 lb)   SpO2 100%   BMI 24.66 kg/m    Body mass index is 24.66 kg/m .  Physical Exam   GENERAL:  Alert oriented well nourished  HEAD: normocephalic atraumatic  SKIN:  no rash, hives, other lesions.  LYMPHATIC: no abnormal lymph nodes palable in cervical, axillary, supraclavicular or inguinal area.  RESP:  No rales or rhonchi, breath sounds bilaterally equal and vesicular  CV:  No tachycardia, S1 S2 normal No murmur.  GI:  Abdomen soft nontender no hepato or splenomegaly  MUSCULOSKELETAL:  No visible joint redness or swelling.  NEURO:  No gross weakness gait normal  PSYCH: pleasant affect    Labs: I personally reviewed complete blood count, differential, renal function test, liver function tests LDH.  No cytopenias, LFTs within normal limits, renal function test within normal limits and LDH is normal as well.    Assessment and Plan:    1) Follicular lymphoma 3B:  - He has no evidence of lymphoma recurrence or progression.  - I will see him in 4 months and labs.    Total time spent on date of service in review of medical records, review of labs, history taking, physical exam, discussion of assessment and plan, counseling and patient education is 30 minutes.    Avelina Peñaloza MD  Attending Physician  Pager [phone]            Signed Electronically by: Avelina Peñaloza MD

## 2025-05-12 ENCOUNTER — TELEPHONE (OUTPATIENT)
Dept: CARDIOLOGY | Facility: CLINIC | Age: 65
End: 2025-05-12
Payer: COMMERCIAL

## 2025-05-12 NOTE — TELEPHONE ENCOUNTER
Called patient to review prep for procedure, all questions answered. No approval necessary by insurance.     DCCV/LES prep instructions    Patient is scheduled for a Cardioversion at [facility] - [address], [place] [address] - Main Entrance of the Hospital, on Friday 5/16/25.  Check in time is at 0830 and procedure to follow.    Patient instructed to remain NPO for solid foods 8 hours prior to arrival and may have clear liquids up to 2 hours prior to arrival for their DCCV.    Patient is taking Pradaxa/Xarelto/Eliquis and has been taking daily uninterrupted for at least 21days.     Patient is not diabetic.     Patient is not taking Digoxin.    Patient is not on diuretics.    Pt is not on a SGLT2 inhibitor.    Pt is not on a GLP-1 Agonist    Patient advised to take their other daily medications the morning of the procedure with small sips of water.     Verified patient has someone available to drive them home from the hospital and can stay with them for 24 hours after the procedure.     Patient advised to notify care team with any new COVID like symptoms prior to procedure.    Patient advised to notify care team with any new COVID like symptoms prior to procedure. Day of procedure phone number: Rom at [phone]    Patient is aware of visitor policy.    Patient expresses understanding of above instructions and denies further questions at this time.      Tiffani Cassidy RN  [facility]

## 2025-05-16 ENCOUNTER — HOSPITAL ENCOUNTER (OUTPATIENT)
Facility: CLINIC | Age: 65
Discharge: HOME | End: 2025-05-16
Payer: COMMERCIAL

## 2025-05-16 VITALS
HEART RATE: 47 BPM | TEMPERATURE: 97.5 F | DIASTOLIC BLOOD PRESSURE: 95 MMHG | OXYGEN SATURATION: 100 % | RESPIRATION RATE: 16 BRPM | SYSTOLIC BLOOD PRESSURE: 123 MMHG

## 2025-05-16 LAB
INR PPP: 1.61 (ref 0.85–1.15)
MAGNESIUM SERPL-MCNC: 2.1 MG/DL (ref 1.7–2.3)
POTASSIUM SERPL-SCNC: 4.6 MMOL/L (ref 3.4–5.3)
PROTHROMBIN TIME: 18.7 SECONDS (ref 11.8–14.8)

## 2025-05-16 PROCEDURE — 258N000003 HC RX IP 258 OP 636: Performed by: INTERNAL MEDICINE

## 2025-05-16 PROCEDURE — 370N000017 HC ANESTHESIA TECHNICAL FEE, PER MIN

## 2025-05-16 PROCEDURE — 84132 ASSAY OF SERUM POTASSIUM: CPT | Performed by: INTERNAL MEDICINE

## 2025-05-16 PROCEDURE — 36415 COLL VENOUS BLD VENIPUNCTURE: CPT | Performed by: INTERNAL MEDICINE

## 2025-05-16 PROCEDURE — 93005 ELECTROCARDIOGRAM TRACING: CPT

## 2025-05-16 PROCEDURE — 999N000010 HC STATISTIC ANES STAT CODE-CRNA PER MINUTE

## 2025-05-16 PROCEDURE — 83735 ASSAY OF MAGNESIUM: CPT | Performed by: INTERNAL MEDICINE

## 2025-05-16 PROCEDURE — 85610 PROTHROMBIN TIME: CPT | Performed by: INTERNAL MEDICINE

## 2025-05-16 PROCEDURE — 93010 ELECTROCARDIOGRAM REPORT: CPT | Mod: XU | Performed by: INTERNAL MEDICINE

## 2025-05-16 PROCEDURE — 92960 CARDIOVERSION ELECTRIC EXT: CPT

## 2025-05-16 PROCEDURE — 999N000054 HC STATISTIC EKG NON-CHARGEABLE

## 2025-05-16 RX ORDER — LIDOCAINE 40 MG/G
CREAM TOPICAL
Status: DISCONTINUED | OUTPATIENT
Start: 2025-05-16 | End: 2025-05-16 | Stop reason: HOSPADM

## 2025-05-16 RX ORDER — MAGNESIUM SULFATE HEPTAHYDRATE 40 MG/ML
2 INJECTION, SOLUTION INTRAVENOUS
Status: DISCONTINUED | OUTPATIENT
Start: 2025-05-16 | End: 2025-05-16 | Stop reason: HOSPADM

## 2025-05-16 RX ORDER — SODIUM CHLORIDE 9 MG/ML
INJECTION, SOLUTION INTRAVENOUS CONTINUOUS
Status: DISCONTINUED | OUTPATIENT
Start: 2025-05-16 | End: 2025-05-16 | Stop reason: HOSPADM

## 2025-05-16 RX ORDER — POTASSIUM CHLORIDE 1500 MG/1
20 TABLET, EXTENDED RELEASE ORAL
Status: DISCONTINUED | OUTPATIENT
Start: 2025-05-16 | End: 2025-05-16 | Stop reason: HOSPADM

## 2025-05-16 RX ADMIN — SODIUM CHLORIDE: 0.9 INJECTION, SOLUTION INTRAVENOUS at 09:06

## 2025-05-16 ASSESSMENT — ACTIVITIES OF DAILY LIVING (ADL)
ADLS_ACUITY_SCORE: 41

## 2025-05-16 NOTE — DISCHARGE INSTRUCTIONS
Cardioversion Discharge Instructions    After you go home:       For 24 hours - due to the sedation you received:    Have an adult stay with you for 24 hours.   Relax and take it easy.  Do NOT make any important or legal decisions.  Do NOT drive or operate machines at home or at work.  Do NOT drink alcohol.    Diet:    Start with clear liquids and progress to your normal diet as you feel able.    Medicines:    Take your medications, including blood thinners, unless your provider tells you not to.  If you have stopped any medications, check with your provider about when to restart them.    Follow Up Appointments:    Follow up with your cardiologist at [facility] of patient preference as instructed.  Follow up with your primary care provider as needed.    Post cardioversion:    The skin on your chest or back may feel tender for 48 hours.  If your skin is tender, you may:    Use a cold pack on the site. Never use ice directly on your skin. Use the cold pack for 20 minutes. Remove it for at least 30 minutes before re-using.  Apply 1% hydrocortisone cream to the skin (sold at drug stores)  Take Advil (Ibuprofen) or Tylenol (Acetaminophen) per your provider's recommendations.      Call your provider if you have:    Weakness, dizziness, lightheadedness, or fainting.  Shortness of breath.  Irregular heartbeat, feelings of your heart fluttering or beating fast, hard or palpitations.   More than minor skin discomfort or redness where the cardioversion pads were placed.  Questions or concerns.      Call 911 if you have:    Pain in your chest, arm, shoulder, neck, or upper back.  You have problems speaking or seeing.  Weakness in your arm or leg.  You are unable to move your arm or leg.  You have uncontrolled bleeding.         [facility] [place]:    [phone] [facility] (7 days a week)

## 2025-05-16 NOTE — PROGRESS NOTES
Reason for Visit: Cardioversion    0900 A/O. Pt denies sleep apnea. No dentures or loose teeth. NPO x 12+ hrs. Pt's daughter, Luda, at bedside. Discharge instructions given to pt & Luda w/ verbal understanding received. All questions & concerns addressed.     0900 EKG ordered to verify if pt is in A Fib.    0908 EKG done - rhythm is a fib    CDV: Pt tolerated well. CDV x 3. See rhythm strips. See Procedural Sedation Flowsheet. Total sedation given by anesthesia 100mg propofol.    1145 Pt refused w/c ride, discharged ambulatory to private vehicle. All personal belongings sent with pt.

## 2025-05-17 LAB
ATRIAL RATE - MUSE: 340 BPM
DIASTOLIC BLOOD PRESSURE - MUSE: NORMAL MMHG
INTERPRETATION ECG - MUSE: NORMAL
P AXIS - MUSE: NORMAL DEGREES
PR INTERVAL - MUSE: NORMAL MS
QRS DURATION - MUSE: 96 MS
QT - MUSE: 430 MS
QTC - MUSE: 407 MS
R AXIS - MUSE: 86 DEGREES
SYSTOLIC BLOOD PRESSURE - MUSE: NORMAL MMHG
T AXIS - MUSE: 78 DEGREES
VENTRICULAR RATE- MUSE: 54 BPM

## 2025-06-13 ENCOUNTER — RESULTS FOLLOW-UP (OUTPATIENT)
Dept: CARDIOLOGY | Facility: CLINIC | Age: 65
End: 2025-06-13